# Patient Record
Sex: FEMALE | Race: WHITE | NOT HISPANIC OR LATINO | Employment: FULL TIME | URBAN - METROPOLITAN AREA
[De-identification: names, ages, dates, MRNs, and addresses within clinical notes are randomized per-mention and may not be internally consistent; named-entity substitution may affect disease eponyms.]

---

## 2018-03-09 ENCOUNTER — OFFICE VISIT CONVERTED (OUTPATIENT)
Dept: ORTHOPEDIC SURGERY | Facility: CLINIC | Age: 58
End: 2018-03-09
Attending: ORTHOPAEDIC SURGERY

## 2018-03-09 ENCOUNTER — CONVERSION ENCOUNTER (OUTPATIENT)
Dept: ORTHOPEDIC SURGERY | Facility: CLINIC | Age: 58
End: 2018-03-09

## 2018-08-27 ENCOUNTER — OFFICE VISIT CONVERTED (OUTPATIENT)
Dept: ORTHOPEDIC SURGERY | Facility: CLINIC | Age: 58
End: 2018-08-27
Attending: ORTHOPAEDIC SURGERY

## 2018-09-05 ENCOUNTER — CONVERSION ENCOUNTER (OUTPATIENT)
Dept: GASTROENTEROLOGY | Facility: CLINIC | Age: 58
End: 2018-09-05

## 2018-09-05 ENCOUNTER — OFFICE VISIT CONVERTED (OUTPATIENT)
Dept: GASTROENTEROLOGY | Facility: CLINIC | Age: 58
End: 2018-09-05
Attending: INTERNAL MEDICINE

## 2018-10-22 ENCOUNTER — CONVERSION ENCOUNTER (OUTPATIENT)
Dept: GASTROENTEROLOGY | Facility: CLINIC | Age: 58
End: 2018-10-22

## 2018-10-22 ENCOUNTER — OFFICE VISIT CONVERTED (OUTPATIENT)
Dept: GASTROENTEROLOGY | Facility: CLINIC | Age: 58
End: 2018-10-22
Attending: NURSE PRACTITIONER

## 2018-11-26 ENCOUNTER — OFFICE VISIT CONVERTED (OUTPATIENT)
Dept: GASTROENTEROLOGY | Facility: CLINIC | Age: 58
End: 2018-11-26
Attending: NURSE PRACTITIONER

## 2019-01-22 ENCOUNTER — OFFICE VISIT CONVERTED (OUTPATIENT)
Dept: PULMONOLOGY | Facility: CLINIC | Age: 59
End: 2019-01-22
Attending: INTERNAL MEDICINE

## 2019-01-25 ENCOUNTER — HOSPITAL ENCOUNTER (OUTPATIENT)
Dept: CARDIOLOGY | Facility: HOSPITAL | Age: 59
Discharge: HOME | End: 2019-01-25
Attending: INTERNAL MEDICINE

## 2019-02-28 ENCOUNTER — OFFICE VISIT CONVERTED (OUTPATIENT)
Dept: INTERNAL MEDICINE | Facility: CLINIC | Age: 59
End: 2019-02-28
Attending: INTERNAL MEDICINE

## 2019-03-13 ENCOUNTER — OFFICE VISIT CONVERTED (OUTPATIENT)
Dept: INTERNAL MEDICINE | Facility: CLINIC | Age: 59
End: 2019-03-13
Attending: INTERNAL MEDICINE

## 2019-03-27 ENCOUNTER — OFFICE VISIT CONVERTED (OUTPATIENT)
Dept: INTERNAL MEDICINE | Facility: CLINIC | Age: 59
End: 2019-03-27
Attending: INTERNAL MEDICINE

## 2019-04-15 ENCOUNTER — OFFICE VISIT CONVERTED (OUTPATIENT)
Dept: ORTHOPEDIC SURGERY | Facility: CLINIC | Age: 59
End: 2019-04-15
Attending: ORTHOPAEDIC SURGERY

## 2019-04-15 ENCOUNTER — CONVERSION ENCOUNTER (OUTPATIENT)
Dept: ORTHOPEDIC SURGERY | Facility: CLINIC | Age: 59
End: 2019-04-15

## 2019-05-10 ENCOUNTER — OFFICE VISIT CONVERTED (OUTPATIENT)
Dept: PULMONOLOGY | Facility: CLINIC | Age: 59
End: 2019-05-10
Attending: INTERNAL MEDICINE

## 2019-05-10 ENCOUNTER — HOSPITAL ENCOUNTER (OUTPATIENT)
Dept: GENERAL RADIOLOGY | Facility: HOSPITAL | Age: 59
Discharge: HOME | End: 2019-05-10
Attending: INTERNAL MEDICINE

## 2019-05-15 ENCOUNTER — HOSPITAL ENCOUNTER (OUTPATIENT)
Dept: MRI IMAGING | Facility: HOSPITAL | Age: 59
Discharge: HOME | End: 2019-05-15
Attending: ORTHOPAEDIC SURGERY

## 2019-05-20 ENCOUNTER — OFFICE VISIT CONVERTED (OUTPATIENT)
Dept: ORTHOPEDIC SURGERY | Facility: CLINIC | Age: 59
End: 2019-05-20
Attending: ORTHOPAEDIC SURGERY

## 2019-07-19 ENCOUNTER — OFFICE VISIT CONVERTED (OUTPATIENT)
Dept: INTERNAL MEDICINE | Facility: CLINIC | Age: 59
End: 2019-07-19
Attending: NURSE PRACTITIONER

## 2019-08-29 ENCOUNTER — CONVERSION ENCOUNTER (OUTPATIENT)
Dept: INTERNAL MEDICINE | Facility: CLINIC | Age: 59
End: 2019-08-29

## 2019-08-29 ENCOUNTER — OFFICE VISIT CONVERTED (OUTPATIENT)
Dept: INTERNAL MEDICINE | Facility: CLINIC | Age: 59
End: 2019-08-29
Attending: INTERNAL MEDICINE

## 2019-10-28 ENCOUNTER — OFFICE VISIT CONVERTED (OUTPATIENT)
Dept: INTERNAL MEDICINE | Facility: CLINIC | Age: 59
End: 2019-10-28
Attending: INTERNAL MEDICINE

## 2020-02-24 ENCOUNTER — HOSPITAL ENCOUNTER (OUTPATIENT)
Dept: LAB | Facility: HOSPITAL | Age: 60
Discharge: HOME | End: 2020-02-24
Attending: INTERNAL MEDICINE

## 2020-02-24 LAB
25(OH)D3 SERPL-MCNC: 21.1 NG/ML (ref 30–100)
ALBUMIN SERPL-MCNC: 4.3 G/DL (ref 3.5–5)
ALBUMIN/GLOB SERPL: 1.4 {RATIO} (ref 1.4–2.6)
ALP SERPL-CCNC: 61 U/L (ref 53–141)
ALT SERPL-CCNC: 8 U/L (ref 10–40)
ANION GAP SERPL CALC-SCNC: 19 MMOL/L (ref 8–19)
AST SERPL-CCNC: 13 U/L (ref 15–50)
BASOPHILS # BLD AUTO: 0.06 10*3/UL (ref 0–0.2)
BASOPHILS NFR BLD AUTO: 1 % (ref 0–3)
BILIRUB SERPL-MCNC: 0.42 MG/DL (ref 0.2–1.3)
BUN SERPL-MCNC: 15 MG/DL (ref 5–25)
BUN/CREAT SERPL: 16 {RATIO} (ref 6–20)
CALCIUM SERPL-MCNC: 9.6 MG/DL (ref 8.7–10.4)
CHLORIDE SERPL-SCNC: 102 MMOL/L (ref 99–111)
CHOLEST SERPL-MCNC: 220 MG/DL (ref 107–200)
CHOLEST/HDLC SERPL: 4.2 {RATIO} (ref 3–6)
CONV ABS IMM GRAN: 0.03 10*3/UL (ref 0–0.2)
CONV CO2: 28 MMOL/L (ref 22–32)
CONV IMMATURE GRAN: 0.5 % (ref 0–1.8)
CONV TOTAL PROTEIN: 7.4 G/DL (ref 6.3–8.2)
CREAT UR-MCNC: 0.91 MG/DL (ref 0.5–0.9)
DEPRECATED RDW RBC AUTO: 43.5 FL (ref 36.4–46.3)
EOSINOPHIL # BLD AUTO: 0.32 10*3/UL (ref 0–0.7)
EOSINOPHIL # BLD AUTO: 5.1 % (ref 0–7)
ERYTHROCYTE [DISTWIDTH] IN BLOOD BY AUTOMATED COUNT: 12.8 % (ref 11.7–14.4)
GFR SERPLBLD BASED ON 1.73 SQ M-ARVRAT: >60 ML/MIN/{1.73_M2}
GLOBULIN UR ELPH-MCNC: 3.1 G/DL (ref 2–3.5)
GLUCOSE SERPL-MCNC: 93 MG/DL (ref 65–99)
HCT VFR BLD AUTO: 48.6 % (ref 37–47)
HDLC SERPL-MCNC: 53 MG/DL (ref 40–60)
HGB BLD-MCNC: 15.1 G/DL (ref 12–16)
LDLC SERPL CALC-MCNC: 137 MG/DL (ref 70–100)
LYMPHOCYTES # BLD AUTO: 1.79 10*3/UL (ref 1–5)
LYMPHOCYTES NFR BLD AUTO: 28.5 % (ref 20–45)
MCH RBC QN AUTO: 28.7 PG (ref 27–31)
MCHC RBC AUTO-ENTMCNC: 31.1 G/DL (ref 33–37)
MCV RBC AUTO: 92.4 FL (ref 81–99)
MONOCYTES # BLD AUTO: 0.45 10*3/UL (ref 0.2–1.2)
MONOCYTES NFR BLD AUTO: 7.2 % (ref 3–10)
NEUTROPHILS # BLD AUTO: 3.62 10*3/UL (ref 2–8)
NEUTROPHILS NFR BLD AUTO: 57.7 % (ref 30–85)
NRBC CBCN: 0 % (ref 0–0.7)
OSMOLALITY SERPL CALC.SUM OF ELEC: 301 MOSM/KG (ref 273–304)
PLATELET # BLD AUTO: 372 10*3/UL (ref 130–400)
PMV BLD AUTO: 9.8 FL (ref 9.4–12.3)
POTASSIUM SERPL-SCNC: 3.9 MMOL/L (ref 3.5–5.3)
RBC # BLD AUTO: 5.26 10*6/UL (ref 4.2–5.4)
SODIUM SERPL-SCNC: 145 MMOL/L (ref 135–147)
TRIGL SERPL-MCNC: 150 MG/DL (ref 40–150)
TSH SERPL-ACNC: 1.56 M[IU]/L (ref 0.27–4.2)
VLDLC SERPL-MCNC: 30 MG/DL (ref 5–37)
WBC # BLD AUTO: 6.27 10*3/UL (ref 4.8–10.8)

## 2020-03-04 ENCOUNTER — OFFICE VISIT CONVERTED (OUTPATIENT)
Dept: INTERNAL MEDICINE | Facility: CLINIC | Age: 60
End: 2020-03-04
Attending: INTERNAL MEDICINE

## 2020-03-04 ENCOUNTER — CONVERSION ENCOUNTER (OUTPATIENT)
Dept: INTERNAL MEDICINE | Facility: CLINIC | Age: 60
End: 2020-03-04

## 2020-09-04 ENCOUNTER — HOSPITAL ENCOUNTER (OUTPATIENT)
Dept: OTHER | Facility: HOSPITAL | Age: 60
Discharge: HOME | End: 2020-09-04
Attending: INTERNAL MEDICINE

## 2020-09-04 ENCOUNTER — OFFICE VISIT CONVERTED (OUTPATIENT)
Dept: INTERNAL MEDICINE | Facility: CLINIC | Age: 60
End: 2020-09-04
Attending: INTERNAL MEDICINE

## 2020-09-04 LAB
ALBUMIN SERPL-MCNC: 4.2 G/DL (ref 3.5–5)
ALBUMIN/GLOB SERPL: 1.4 {RATIO} (ref 1.4–2.6)
ALP SERPL-CCNC: 61 U/L (ref 53–141)
ALT SERPL-CCNC: 9 U/L (ref 10–40)
ANION GAP SERPL CALC-SCNC: 16 MMOL/L (ref 8–19)
AST SERPL-CCNC: 15 U/L (ref 15–50)
BASOPHILS # BLD AUTO: 0.09 10*3/UL (ref 0–0.2)
BASOPHILS NFR BLD AUTO: 1 % (ref 0–3)
BILIRUB SERPL-MCNC: 0.37 MG/DL (ref 0.2–1.3)
BUN SERPL-MCNC: 15 MG/DL (ref 5–25)
BUN/CREAT SERPL: 14 {RATIO} (ref 6–20)
CALCIUM SERPL-MCNC: 9.4 MG/DL (ref 8.7–10.4)
CHLORIDE SERPL-SCNC: 98 MMOL/L (ref 99–111)
CHOLEST SERPL-MCNC: 238 MG/DL (ref 107–200)
CHOLEST/HDLC SERPL: 5.8 {RATIO} (ref 3–6)
CONV ABS IMM GRAN: 0.04 10*3/UL (ref 0–0.2)
CONV CO2: 27 MMOL/L (ref 22–32)
CONV IMMATURE GRAN: 0.4 % (ref 0–1.8)
CONV TOTAL PROTEIN: 7.3 G/DL (ref 6.3–8.2)
CREAT UR-MCNC: 1.07 MG/DL (ref 0.5–0.9)
DEPRECATED RDW RBC AUTO: 42.7 FL (ref 36.4–46.3)
EOSINOPHIL # BLD AUTO: 0.24 10*3/UL (ref 0–0.7)
EOSINOPHIL # BLD AUTO: 2.7 % (ref 0–7)
ERYTHROCYTE [DISTWIDTH] IN BLOOD BY AUTOMATED COUNT: 12.4 % (ref 11.7–14.4)
GFR SERPLBLD BASED ON 1.73 SQ M-ARVRAT: 57 ML/MIN/{1.73_M2}
GLOBULIN UR ELPH-MCNC: 3.1 G/DL (ref 2–3.5)
GLUCOSE SERPL-MCNC: 87 MG/DL (ref 65–99)
HCT VFR BLD AUTO: 47.3 % (ref 37–47)
HDLC SERPL-MCNC: 41 MG/DL (ref 40–60)
HGB BLD-MCNC: 14.8 G/DL (ref 12–16)
LDLC SERPL CALC-MCNC: 149 MG/DL (ref 70–100)
LYMPHOCYTES # BLD AUTO: 2.21 10*3/UL (ref 1–5)
LYMPHOCYTES NFR BLD AUTO: 24.5 % (ref 20–45)
MCH RBC QN AUTO: 29 PG (ref 27–31)
MCHC RBC AUTO-ENTMCNC: 31.3 G/DL (ref 33–37)
MCV RBC AUTO: 92.6 FL (ref 81–99)
MONOCYTES # BLD AUTO: 0.48 10*3/UL (ref 0.2–1.2)
MONOCYTES NFR BLD AUTO: 5.3 % (ref 3–10)
NEUTROPHILS # BLD AUTO: 5.95 10*3/UL (ref 2–8)
NEUTROPHILS NFR BLD AUTO: 66.1 % (ref 30–85)
NRBC CBCN: 0 % (ref 0–0.7)
OSMOLALITY SERPL CALC.SUM OF ELEC: 284 MOSM/KG (ref 273–304)
PLATELET # BLD AUTO: 406 10*3/UL (ref 130–400)
PMV BLD AUTO: 10.8 FL (ref 9.4–12.3)
POTASSIUM SERPL-SCNC: 3.6 MMOL/L (ref 3.5–5.3)
RBC # BLD AUTO: 5.11 10*6/UL (ref 4.2–5.4)
SODIUM SERPL-SCNC: 137 MMOL/L (ref 135–147)
TRIGL SERPL-MCNC: 241 MG/DL (ref 40–150)
TSH SERPL-ACNC: 1.47 M[IU]/L (ref 0.27–4.2)
VIT B12 SERPL-MCNC: 567 PG/ML (ref 211–911)
VLDLC SERPL-MCNC: 48 MG/DL (ref 5–37)
WBC # BLD AUTO: 9.01 10*3/UL (ref 4.8–10.8)

## 2020-09-05 LAB — 25(OH)D3 SERPL-MCNC: 27.8 NG/ML (ref 30–100)

## 2021-03-05 ENCOUNTER — OFFICE VISIT CONVERTED (OUTPATIENT)
Dept: INTERNAL MEDICINE | Facility: CLINIC | Age: 61
End: 2021-03-05
Attending: STUDENT IN AN ORGANIZED HEALTH CARE EDUCATION/TRAINING PROGRAM

## 2021-03-05 ENCOUNTER — HOSPITAL ENCOUNTER (OUTPATIENT)
Dept: OTHER | Facility: HOSPITAL | Age: 61
Discharge: HOME | End: 2021-03-05
Attending: STUDENT IN AN ORGANIZED HEALTH CARE EDUCATION/TRAINING PROGRAM

## 2021-03-05 LAB
25(OH)D3 SERPL-MCNC: 21.4 NG/ML (ref 30–100)
ALBUMIN SERPL-MCNC: 4.1 G/DL (ref 3.5–5)
ALBUMIN/GLOB SERPL: 1.6 {RATIO} (ref 1.4–2.6)
ALP SERPL-CCNC: 54 U/L (ref 53–141)
ALT SERPL-CCNC: 8 U/L (ref 10–40)
ANION GAP SERPL CALC-SCNC: 18 MMOL/L (ref 8–19)
AST SERPL-CCNC: 13 U/L (ref 15–50)
BASOPHILS # BLD AUTO: 0.07 10*3/UL (ref 0–0.2)
BASOPHILS NFR BLD AUTO: 1.2 % (ref 0–3)
BILIRUB SERPL-MCNC: 0.52 MG/DL (ref 0.2–1.3)
BUN SERPL-MCNC: 12 MG/DL (ref 5–25)
BUN/CREAT SERPL: 13 {RATIO} (ref 6–20)
CALCIUM SERPL-MCNC: 9.5 MG/DL (ref 8.7–10.4)
CHLORIDE SERPL-SCNC: 103 MMOL/L (ref 99–111)
CHOLEST SERPL-MCNC: 220 MG/DL (ref 107–200)
CHOLEST/HDLC SERPL: 4.7 {RATIO} (ref 3–6)
CONV ABS IMM GRAN: 0.02 10*3/UL (ref 0–0.2)
CONV CO2: 27 MMOL/L (ref 22–32)
CONV IMMATURE GRAN: 0.3 % (ref 0–1.8)
CONV TOTAL PROTEIN: 6.7 G/DL (ref 6.3–8.2)
CREAT UR-MCNC: 0.9 MG/DL (ref 0.5–0.9)
DEPRECATED RDW RBC AUTO: 41.6 FL (ref 36.4–46.3)
EOSINOPHIL # BLD AUTO: 0.22 10*3/UL (ref 0–0.7)
EOSINOPHIL # BLD AUTO: 3.7 % (ref 0–7)
ERYTHROCYTE [DISTWIDTH] IN BLOOD BY AUTOMATED COUNT: 12.9 % (ref 11.7–14.4)
GFR SERPLBLD BASED ON 1.73 SQ M-ARVRAT: >60 ML/MIN/{1.73_M2}
GLOBULIN UR ELPH-MCNC: 2.6 G/DL (ref 2–3.5)
GLUCOSE SERPL-MCNC: 91 MG/DL (ref 65–99)
HCT VFR BLD AUTO: 43.7 % (ref 37–47)
HDLC SERPL-MCNC: 47 MG/DL (ref 40–60)
HGB BLD-MCNC: 14.3 G/DL (ref 12–16)
LDLC SERPL CALC-MCNC: 140 MG/DL (ref 70–100)
LYMPHOCYTES # BLD AUTO: 1.85 10*3/UL (ref 1–5)
LYMPHOCYTES NFR BLD AUTO: 31.2 % (ref 20–45)
MCH RBC QN AUTO: 28.8 PG (ref 27–31)
MCHC RBC AUTO-ENTMCNC: 32.7 G/DL (ref 33–37)
MCV RBC AUTO: 88.1 FL (ref 81–99)
MONOCYTES # BLD AUTO: 0.39 10*3/UL (ref 0.2–1.2)
MONOCYTES NFR BLD AUTO: 6.6 % (ref 3–10)
NEUTROPHILS # BLD AUTO: 3.38 10*3/UL (ref 2–8)
NEUTROPHILS NFR BLD AUTO: 57 % (ref 30–85)
NRBC CBCN: 0 % (ref 0–0.7)
OSMOLALITY SERPL CALC.SUM OF ELEC: 297 MOSM/KG (ref 273–304)
PLATELET # BLD AUTO: 356 10*3/UL (ref 130–400)
PMV BLD AUTO: 9.9 FL (ref 9.4–12.3)
POTASSIUM SERPL-SCNC: 3.5 MMOL/L (ref 3.5–5.3)
RBC # BLD AUTO: 4.96 10*6/UL (ref 4.2–5.4)
SODIUM SERPL-SCNC: 144 MMOL/L (ref 135–147)
T4 FREE SERPL-MCNC: 1.4 NG/DL (ref 0.9–1.8)
TRIGL SERPL-MCNC: 165 MG/DL (ref 40–150)
TSH SERPL-ACNC: 1.18 M[IU]/L (ref 0.27–4.2)
VLDLC SERPL-MCNC: 33 MG/DL (ref 5–37)
WBC # BLD AUTO: 5.93 10*3/UL (ref 4.8–10.8)

## 2021-05-13 NOTE — PROGRESS NOTES
"   Progress Note      Patient Name: Oksana Smith   Patient ID: [id number]   Sex: Female   YOB: 1960    Primary Care Provider: Portia Serna MD   Referring Provider: Omid Carlos MD    Visit Date: September 4, 2020    Provider: Portia Serna MD   Location: [facility]   Location Address: [address] [place] [address], [address]  [place], [place]  [id number]   Location Phone: [phone]          Chief Complaint  · 6 month f/u  · \"right ear hurting\"      History Of Present Illness  Oksana Smith is a 59 year old /White female who presents for evaluation and treatment of:      6 month f/u, needs labs today, no concerns with chronic conditions    \"right ear pain\" x 4 days, stuck a Q-tip in her ear today and it hurt so she knows its infected.   denies fever           Past Medical History  Disease Name Date Onset Notes   Atypical ductal hyperplasia of right breast --  --    Breast lump --  --    Cancer --  --    Chronic cough --  --    Constipation --  --    Early satiety --  --    Esophageal dysphagia --  --    Fatigue --  --    Hemidiaphragm paralysis --  --    HTN (hypertension) --  --    Hypothyroidism --  --    Lateral epicondylitis, left 03/14/2018 --    Rectal Bleeding --  --    Right knee pain, unspecified chronicity 08/30/2018 --    Right knee PCL tear 08/30/2018 --    Shortness of breath --  --    Thymoma, malignant --  --    Thyroid disorder --  --    Vitamin B12 Deficiency --  --    Vitamin D Deficiency --  --          Past Surgical History  Procedure Name Date Notes   Colonoscopy --  Dr. Roque   EGD 9.11.18 Dr. Roque   Hysterectomy 1994 --    Lumpectomy, right breast --  --    Tonsilectomy 1967 --    Tonsillectomy --  --          Medication List  Name Date Started Instructions   ammonium lactate 12 % topical lotion 02/28/2019 apply to affected area by topical route 2 times a day as needed   clotrimazole-betamethasone 1-0.05 % topical lotion 09/04/2020 apply to the " affected area(s) by topical route 2 times per day in the morning and evening   cyanocobalamin (vitamin B-12) 1,000 mcg oral capsule 2020 take 1 capsule by oral route daily for 90 days   ergocalciferol (vitamin D2) 1,250 mcg (50,000 unit) oral capsule 2020 TAKE 1 CAPSULE BY MOUTH 1 TIME WEEKLY   nystatin 100,000 unit/gram topical powder 2020 apply to the affected area(s) by topical route 3 times per day   Procare TENS-EMS miscellaneous combo pack 2020 use as directed   Synthroid 50 mcg oral tablet 2020 take 1 tablet (50 mcg) by oral route once daily for 90 days   triamterene-hydrochlorothiazid 37.5-25 mg oral tablet 2020 take 0.5 tablet by oral route daily as needed for 90 days   vitamin B complex oral tablet  take 1 tablet by oral route daily         Allergy List  Allergen Name Date Reaction Notes   clindamycin HCl --  --  --    erythromycin --  --  --          Family Medical History  Disease Name Relative/Age Notes   Breast Neoplasm, Malignant Mother/   --    Heart Disease Father/  Mother/   Mother; Father  Mother   Cancer, Unspecified Mother/   Mother   Diabetes, unspecified type Sister/   Sister   Family history of certain chronic disabling diseases; arthritis Father/  Mother/   Mother; Father   No family history of colorectal cancer  --          Reproductive History  Menstrual   Pregnancy Summary   Total Pregnancies: 3 Full Term: 3 Premature: 0   Ab Induced: 0 Ab Spontaneous: 0 Ectopics: 0   Multiples: 0 Livin         Social History  Finding Status Start/Stop Quantity Notes   Alcohol Use Never --/-- --  does not drink   lives with spouse --  --/-- --  --    . --  --/-- --  --    Recreational Drug Use Never --/-- --  no   Tobacco Never --/-- --  never smoker   Working --  --/-- --  --          Immunizations  NameDate Admin Mfg Trade Name Lot Number Route Inj VIS Given VIS Publication   [id number] [employer] Fluarix, quadrivalent, preservative free [id number] NE  "02/28/2019 08/07/2015   Comments:          Review of Systems  · Constitutional  o Denies  o : fever, fatigue, weight loss, weight gain  · HENT  o Admits  o : ear pain  · Cardiovascular  o Denies  o : lower extremity edema, claudication, chest pressure, palpitations  · Respiratory  o Denies  o : shortness of breath, wheezing, cough, hemoptysis, dyspnea on exertion  · Gastrointestinal  o Denies  o : nausea, vomiting, diarrhea, constipation, abdominal pain      Vitals  Date Time BP Position Site L\R Cuff Size HR RR TEMP (F) WT  HT  BMI kg/m2 BSA m2 O2 Sat HC       10/28/2019 02:51 /76 Sitting    83 - R  99.6 179lbs 2oz 5'  5\" 29.81 1.93 96 %    03/04/2020 04:10 /66 Sitting    82 - R  98.2 180lbs 2oz 5'  5\" 29.97 1.94 98 %    09/04/2020 03:23 /80 Sitting    96 - R  98.7 171lbs 0oz 5'  5\" 28.46 1.89 97 %          Physical Examination  · Constitutional  o Appearance  o : no acute distress, well-nourished  · Head and Face  o Head  o :   § Inspection  § : atraumatic, normocephalic  · Eyes  o Eyes  o : extraocular movements intact, no scleral icterus, no conjunctival injection  · Ears, Nose, Mouth and Throat  o Ears  o :   § External Ears  § : normal  § Otoscopic Examination  § : tympanic membrane appearance within normal limits bilaterally, right ear canal erythematous with small amount of yellow drainage  o Nose  o :   § Intranasal Exam  § : nares patent  o Oral Cavity  o :   § Oral Mucosa  § : moist mucous membranes  · Respiratory  o Respiratory Effort  o : breathing comfortably, symmetric chest rise  o Auscultation of Lungs  o : clear to asculatation bilaterally, no wheezes, rales, or rhonchii  · Cardiovascular  o Heart  o :   § Auscultation of Heart  § : regular rate and rhythm, no murmurs, rubs, or gallops  o Peripheral Vascular System  o :   § Extremities  § : no edema  · Neurologic  o Mental Status Examination  o :   § Orientation  § : grossly oriented to person, place and time  o Gait and " Station  o :   § Gait Screening  § : normal gait  · Psychiatric  o General  o : normal mood and affect          Assessment  · HTN (hypertension)     401.9/I10  well controlled today  · Vitamin D Deficiency     268.9/E55.9  · Hypothyroidism     244.9/E03.9  labs previously ordered, will draw today in clinic  · Vitamin B12 Deficiency     281.1/D51.9  · Otitis externa of right ear     380.10/H60.91  will treat with Ciprodex drops    Problems Reconciled  Plan  · Orders  o ACO-39: Current medications updated and reviewed () - - 09/04/2020  · Medications  o Ciprodex 0.3-0.1 % otic (ear) drops,suspension   SIG: instill 4 drops into right ear by otic route 2 times per day for 7 days   DISP: (1) 7.5 ml drop btl with 0 refills  Prescribed on 09/04/2020     o Medications have been Reconciled  o Transition of Care or Provider Policy  · Instructions  o Take all medications as prescribed/directed.  o Patient was educated/instructed on their diagnosis, treatment and medications prior to discharge from the clinic today.  o Call the office with any concerns or questions.  · Disposition  o Follow up in 6 months  o Labs drawn in clinic  o Prescriptions sent electronically            Electronically Signed by: Portia Serna MD -Author on September 4, 2020 04:01:23 PM

## 2021-05-14 VITALS
SYSTOLIC BLOOD PRESSURE: 124 MMHG | HEIGHT: 65 IN | WEIGHT: 171 LBS | OXYGEN SATURATION: 97 % | HEART RATE: 96 BPM | TEMPERATURE: 98.7 F | BODY MASS INDEX: 28.49 KG/M2 | DIASTOLIC BLOOD PRESSURE: 80 MMHG

## 2021-05-14 VITALS
BODY MASS INDEX: 27.7 KG/M2 | TEMPERATURE: 98 F | HEIGHT: 65 IN | WEIGHT: 166.25 LBS | HEART RATE: 80 BPM | DIASTOLIC BLOOD PRESSURE: 70 MMHG | OXYGEN SATURATION: 96 % | SYSTOLIC BLOOD PRESSURE: 124 MMHG

## 2021-05-14 NOTE — PROGRESS NOTES
Progress Note      Patient Name: Oksana Smith   Patient ID: [id number]   Sex: Female   YOB: 1960    Primary Care Provider: Portia Serna MD   Referring Provider: Omid Carlos MD    Visit Date: March 5, 2021    Provider: Shama Villafuerte MD   Location: [facility]   Location Address: [address] [place] [address], [address]  [place], [place]  [id number]   Location Phone: [phone]          Chief Complaint  · 6 month follow-up      History Of Present Illness  Oksana Smith is a 60 year old /White female who presents for evaluation and treatment of:      She would like referral to thoracic surgeon- Jose Arias, hx of Thymoma in 2018.  Cancer was caught early, and did not require chemotherapy or radiation.      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 She gets tired very easily, winded easily because of this. Bending is very difficulty, winded when she stands upright. Would like walker with seat.She would like referral to Dr. Shazia Friedman ([place]) at [facility].   MRI breast and  Mammogram scheduled for Wednesday 3/10 - needs referral for [facility] for MRI and to [facility] for her mammogram. MRI is for known hx  of ADH (atypical ductal hyperplasia) of right breast.     She is also in need of medication refill.   She would  also like to know about vitamin C  IV therapy for cancer prophylaxis and for energy boosting.     She has not left her house since March 6th of 2020 due to Covid pandemic, due to her increased risk with her lung issues as described above. She's had the Pfizer vaccine and is looking forward to surprising her daughter in [place] this weekend. She is expecting her first grandchild.       Past Medical History  Disease Name Date Onset Notes   Atypical ductal hyperplasia of right breast --  --    Breast lump --  --    Cancer --  --    Chronic cough --  --    Constipation  --  --    Early satiety --  --    Esophageal dysphagia --  --    Fatigue --  --    Hemidiaphragm paralysis --  --    HTN (hypertension) --  --    Hypothyroidism --  --    Lateral epicondylitis, left 03/14/2018 --    Rectal Bleeding --  --    Right knee pain, unspecified chronicity 08/30/2018 --    Right knee PCL tear 08/30/2018 --    Shortness of breath --  --    Thymoma, malignant --  --    Thyroid disorder --  --    Vitamin B12 Deficiency --  --    Vitamin D Deficiency --  --          Past Surgical History  Procedure Name Date Notes   Colonoscopy --  Dr. Roque   EGD 9.11.18 Dr. Roque   Hysterectomy 1994 --    Lumpectomy, right breast --  --    Tonsilectomy 1967 --    Tonsillectomy --  --          Medication List  Name Date Started Instructions   ammonium lactate 12 % topical lotion 02/28/2019 apply to affected area by topical route 2 times a day as needed   Ciprodex 0.3-0.1 % otic (ear) drops,suspension 09/04/2020 instill 4 drops into right ear by otic route 2 times per day for 7 days   clotrimazole-betamethasone 1-0.05 % topical cream 10/01/2020 apply to the affected and surrounding areas of skin by topical route 2 times per day in the morning and evening for 2 weeks   cyanocobalamin (vitamin B-12) 1,000 mcg oral capsule 09/04/2020 take 1 capsule by oral route daily for 90 days   ergocalciferol (vitamin D2) 1,250 mcg (50,000 unit) oral capsule 09/04/2020 TAKE 1 CAPSULE BY MOUTH 1 TIME WEEKLY   nystatin 100,000 unit/gram topical powder 09/04/2020 apply to the affected area(s) by topical route 3 times per day   Procare TENS-EMS miscellaneous combo pack 03/04/2020 use as directed   Synthroid 50 mcg oral tablet 09/04/2020 take 1 tablet (50 mcg) by oral route once daily for 90 days   triamterene-hydrochlorothiazid 37.5-25 mg oral tablet 09/04/2020 take 0.5 tablet by oral route daily as needed for 90 days   vitamin B complex oral tablet  take 1 tablet by oral route daily   VITAMIN D2 50,000IU (ERGO) CAP RX 12/23/2020  "TAKE 1 CAPSULE BY MOUTH 1 TIME WEEKLY         Allergy List  Allergen Name Date Reaction Notes   clindamycin HCl --  --  --    erythromycin --  --  --        Allergies Reconciled  Family Medical History  Disease Name Relative/Age Notes   Breast Neoplasm, Malignant Mother/   --    Heart Disease Father/  Mother/   Mother; Father  Mother   Cancer, Unspecified Mother/   Mother   Diabetes, unspecified type Sister/   Sister   Family history of certain chronic disabling diseases; arthritis Father/  Mother/   Mother; Father   No family history of colorectal cancer  --          Reproductive History  Menstrual   Pregnancy Summary   Total Pregnancies: 3 Full Term: 3 Premature: 0   Ab Induced: 0 Ab Spontaneous: 0 Ectopics: 0   Multiples: 0 Livin         Social History  Finding Status Start/Stop Quantity Notes   Alcohol Use Never --/-- --  does not drink   lives with spouse --  --/-- --  --    . --  --/-- --  --    Recreational Drug Use Never --/-- --  no   Tobacco Never --/-- --  never smoker   Working --  --/-- --  --          Immunizations  NameDate Admin Mfg Trade Name Lot Number Route Inj VIS Given VIS Publication   [id number]/16/2020 [facility] Fluzone Quadrivalent [id number] IM LD 2020    Comments: Patient tolerated well left office in stable condition. CH RN         Review of Systems  · Constitutional  o Admits  o : weight loss  o Denies  o : fever, fatigue, weight gain  · Cardiovascular  o Denies  o : lower extremity edema, claudication, chest pressure, palpitations  · Respiratory  o * See HPI  · Gastrointestinal  o Denies  o : nausea, vomiting, diarrhea, constipation, abdominal pain  · Psychiatric  o Denies  o : anxiety, depression      Vitals  Date Time BP Position Site L\R Cuff Size HR RR TEMP (F) WT  HT  BMI kg/m2 BSA m2 O2 Sat FR L/min FiO2 HC       2020 04:10 /66 Sitting    82 - R  98.2 180lbs 2oz 5'  5\" 29.97 1.94 98 %      2020 03:23 /80 Sitting    96 - R  98.7 171lbs 0oz 5'  5\" " "28.46 1.89 97 %      03/05/2021 09:27 /70 Sitting    80 - R  98 166lbs 4oz 5'  5\" 27.67 1.86 96 %  21%          Physical Examination  · Constitutional  o Appearance  o : no acute distress, well-nourished  · Head and Face  o Head  o :   § Inspection  § : atraumatic, normocephalic  · Ears, Nose, Mouth and Throat  o Ears  o :   § External Ears  § : normal  o Nose  o :   § Intranasal Exam  § : nares patent  o Oral Cavity  o :   § Oral Mucosa  § : moist mucous membranes  · Neck  o Thyroid  o : gland size normal, nontender, no nodules or masses present on palpation, symmetric  · Respiratory  o Respiratory Effort  o : breathing comfortably,  o Auscultation of Lungs  o : clear to asculatation bilaterally, no wheezes, rales, or rhonchi, breath sounds are slightly diminished over the left lung fields due to inability to take in deep breaths on that side.   · Cardiovascular  o Heart  o :   § Auscultation of Heart  § : regular rate and rhythm, no murmurs, rubs, or gallops  o Peripheral Vascular System  o :   § Extremities  § : no edema  · Skin and Subcutaneous Tissue  o General Inspection  o : no lesions present, no areas of discoloration, skin turgor normal  · Neurologic  o Mental Status Examination  o :   § Orientation  § : grossly oriented to person, place and time  o Cranial Nerves  o : crainial nerves 2-12 grossly intact  o Gait and Station  o :   § Gait Screening  § : normal gait          Assessment  · Screening for depression     V79.0/Z13.89  Negative screen with PhQ9 score of 4, + symptoms are related to her decreased energy and activity level related to the paralyzed left diaphragm.   · Visit for screening mammogram     V76.12/Z12.31  · Hypothyroidism     244.9/E03.9  Chronic and stable on Synthroid. in need of labs. Refilled meds.  · Vitamin D deficiency     268.9/E55.9  Chronic. In need of labs for continued monitoring.   · HTN (hypertension)     401.9/I10  Chronic and stable on current regimen.   · Vitamin " B12 Deficiency     281.1/D51.9  Chronic, receiving B12 injections and in need of B12 labs.   · Atypical ductal hyperplasia of right breast     610.8/N62  Chronic and presumed stable. In need of repeat imaging.   · History of Thymoma, malignant     164.0/C37  Hx of thymoma s/p resection in 2018. Did not require chemotherapy or radiation. Follows with Dr. Swenson (thoracic surgery), and Dr. Friedman (Oncology) in [place]. In need of new referrals for her insurance to be able to continue her care with her specialist. New referrals placed today.   · Breathing difficulty     786.09/R06.89  Chronic and stable secondary to left diaphragm paralysis. However limiting her ADL, desires walker with seat to help keep her active and decrease how fast she tires out with activities. DME for walker with seat provided for [facility].  · Diaphragmatic paralysis     519.4/J98.6  Chronic and stable. Occurred secondary to nerve injury during resection of her thymoma.   · COVID-19 vaccine series completed     V87.49/Z92.29    Problems Reconciled  Plan  · Orders  o ACO-18: Negative screen for clinical depression using a standardized tool () - V79.0/Z13.89 - 03/05/2021  o CBC with Auto Diff [facility] ([id number]) - 401.9/I10, 281.1/D51.9, 244.9/E03.9 - 03/05/2021  o CMP [facility] ([id number]) - 401.9/I10, 281.1/D51.9, 244.9/E03.9 - 03/05/2021  o Lipid Panel [facility] ([id number]) - 401.9/I10, 244.9/E03.9, 268.9/E55.9 - 03/05/2021  o Thyroid Profile (59924, 32916, THYII) - 244.9/E03.9 - 03/05/2021  o Vitamin D (25-Hydroxy) Level (01498) - 268.9/E55.9 - 03/05/2021  o ACO-39: Current medications updated and reviewed (, 1159F) - 401.9/I10, 281.1/D51.9, 164.0/C37, 519.4/J98.6 - 03/05/2021  o ONCOLOGY / HEMATOLOGY CONSULTATION (HEMOC) - 164.0/C37 - 03/05/2021   Dr. Diana Friedman at [facility]  o Mammogram 2D diagnostic breast bilateral (w/US if needed) [facility]. (, 00031) - V76.12/Z12.31, 610.8/N62 - 03/05/2021   At [facility]   o MRI Breast  Bilateral WITH and WITHOUT Contrast [facility] ([id number]) - V76.12/Z12.31, 610.8/N62 - 03/05/2021   at [facility]  · Medications  o ammonium lactate 12 % topical lotion   SIG: apply to affected area by topical route 2 times a day as needed   DISP: (1) Package with 2 refills  Refilled on 03/05/2021     o clotrimazole-betamethasone 1-0.05 % topical cream   SIG: apply to the affected and surrounding areas of skin by topical route 2 times per day in the morning and evening for 2 weeks   DISP: (45) Gram with 0 refills  Refilled on 03/05/2021     o cyanocobalamin (vitamin B-12) 1,000 mcg oral capsule   SIG: take 1 capsule by oral route daily for 90 days   DISP: (90) Capsule with 1 refills  Refilled on 03/05/2021     o ergocalciferol (vitamin D2) 1,250 mcg (50,000 unit) oral capsule   SIG: TAKE 1 CAPSULE BY MOUTH 1 TIME WEEKLY   DISP: (13) Capsule with 0 refills  Refilled on 03/05/2021     o Synthroid 50 mcg oral tablet   SIG: take 1 tablet (50 mcg) by oral route once daily for 90 days   DISP: (90) Tablet with 1 refills  Refilled on 03/05/2021     o triamterene-hydrochlorothiazid 37.5-25 mg oral tablet   SIG: take 0.5 tablet by oral route daily as needed for 90 days   DISP: (90) Tablet with 1 refills  Refilled on 03/05/2021     o Medications have been Reconciled  o Transition of Care or Provider Policy  · Instructions  o Depression Screen completed and scanned into the EMR under the designated folder within the patient's documents.  o Today's PHQ-9 result is _4__  o Patient was educated/instructed on their diagnosis, treatment and medications prior to discharge from the clinic today.            Electronically Signed by: Shama Villafuerte MD -Author on March 5, 2021 03:52:03 PM

## 2021-05-15 VITALS
WEIGHT: 177.37 LBS | SYSTOLIC BLOOD PRESSURE: 110 MMHG | BODY MASS INDEX: 29.55 KG/M2 | OXYGEN SATURATION: 98 % | RESPIRATION RATE: 16 BRPM | HEART RATE: 88 BPM | DIASTOLIC BLOOD PRESSURE: 64 MMHG | HEIGHT: 65 IN | TEMPERATURE: 98.4 F

## 2021-05-15 VITALS
OXYGEN SATURATION: 96 % | WEIGHT: 179.12 LBS | DIASTOLIC BLOOD PRESSURE: 76 MMHG | HEIGHT: 65 IN | TEMPERATURE: 99.6 F | HEART RATE: 83 BPM | BODY MASS INDEX: 29.84 KG/M2 | SYSTOLIC BLOOD PRESSURE: 116 MMHG

## 2021-05-15 VITALS
DIASTOLIC BLOOD PRESSURE: 78 MMHG | WEIGHT: 167 LBS | BODY MASS INDEX: 27.82 KG/M2 | OXYGEN SATURATION: 100 % | HEIGHT: 65 IN | RESPIRATION RATE: 12 BRPM | TEMPERATURE: 98.2 F | HEART RATE: 76 BPM | SYSTOLIC BLOOD PRESSURE: 108 MMHG

## 2021-05-15 VITALS
HEART RATE: 82 BPM | DIASTOLIC BLOOD PRESSURE: 66 MMHG | TEMPERATURE: 98.2 F | BODY MASS INDEX: 30.01 KG/M2 | OXYGEN SATURATION: 98 % | WEIGHT: 180.12 LBS | HEIGHT: 65 IN | SYSTOLIC BLOOD PRESSURE: 110 MMHG

## 2021-05-15 VITALS — HEART RATE: 84 BPM | WEIGHT: 172 LBS | HEIGHT: 65 IN | BODY MASS INDEX: 28.66 KG/M2 | OXYGEN SATURATION: 98 %

## 2021-05-15 VITALS
HEART RATE: 89 BPM | DIASTOLIC BLOOD PRESSURE: 78 MMHG | TEMPERATURE: 97.4 F | OXYGEN SATURATION: 97 % | BODY MASS INDEX: 28.86 KG/M2 | WEIGHT: 173.19 LBS | SYSTOLIC BLOOD PRESSURE: 108 MMHG | HEIGHT: 65 IN

## 2021-05-15 VITALS — BODY MASS INDEX: 28.39 KG/M2 | WEIGHT: 170.37 LBS | HEIGHT: 65 IN | OXYGEN SATURATION: 98 % | HEART RATE: 84 BPM

## 2021-05-16 VITALS — OXYGEN SATURATION: 97 % | HEIGHT: 65 IN | HEART RATE: 89 BPM | WEIGHT: 188 LBS | BODY MASS INDEX: 31.32 KG/M2

## 2021-05-16 VITALS
WEIGHT: 174 LBS | TEMPERATURE: 99.2 F | BODY MASS INDEX: 28.99 KG/M2 | DIASTOLIC BLOOD PRESSURE: 88 MMHG | SYSTOLIC BLOOD PRESSURE: 123 MMHG | HEIGHT: 65 IN | HEART RATE: 77 BPM

## 2021-05-16 VITALS
BODY MASS INDEX: 27.66 KG/M2 | HEIGHT: 65 IN | HEART RATE: 76 BPM | DIASTOLIC BLOOD PRESSURE: 74 MMHG | SYSTOLIC BLOOD PRESSURE: 109 MMHG | WEIGHT: 166 LBS

## 2021-05-16 VITALS
OXYGEN SATURATION: 96 % | DIASTOLIC BLOOD PRESSURE: 66 MMHG | WEIGHT: 166 LBS | HEART RATE: 90 BPM | TEMPERATURE: 99.1 F | HEIGHT: 65 IN | SYSTOLIC BLOOD PRESSURE: 110 MMHG | BODY MASS INDEX: 27.66 KG/M2 | RESPIRATION RATE: 16 BRPM

## 2021-05-16 VITALS
HEIGHT: 65 IN | DIASTOLIC BLOOD PRESSURE: 73 MMHG | HEART RATE: 89 BPM | SYSTOLIC BLOOD PRESSURE: 128 MMHG | BODY MASS INDEX: 27.32 KG/M2 | WEIGHT: 164 LBS

## 2021-05-16 VITALS
TEMPERATURE: 97.8 F | BODY MASS INDEX: 27.99 KG/M2 | SYSTOLIC BLOOD PRESSURE: 110 MMHG | WEIGHT: 168 LBS | OXYGEN SATURATION: 100 % | HEIGHT: 65 IN | HEART RATE: 75 BPM | DIASTOLIC BLOOD PRESSURE: 78 MMHG | RESPIRATION RATE: 12 BRPM

## 2021-05-16 VITALS — WEIGHT: 179.12 LBS | HEIGHT: 65 IN | HEART RATE: 84 BPM | OXYGEN SATURATION: 99 % | BODY MASS INDEX: 29.84 KG/M2

## 2021-05-28 VITALS
HEART RATE: 91 BPM | SYSTOLIC BLOOD PRESSURE: 118 MMHG | DIASTOLIC BLOOD PRESSURE: 74 MMHG | HEIGHT: 65 IN | BODY MASS INDEX: 27.9 KG/M2 | WEIGHT: 166 LBS | WEIGHT: 167.44 LBS | HEIGHT: 65 IN | HEART RATE: 96 BPM | BODY MASS INDEX: 27.66 KG/M2 | OXYGEN SATURATION: 97 % | RESPIRATION RATE: 14 BRPM | TEMPERATURE: 97.9 F | SYSTOLIC BLOOD PRESSURE: 112 MMHG | DIASTOLIC BLOOD PRESSURE: 66 MMHG | RESPIRATION RATE: 14 BRPM | TEMPERATURE: 98.7 F | OXYGEN SATURATION: 98 %

## 2021-05-28 NOTE — PROGRESS NOTES
Patient: DAVID FINLEY     Acct: [id number]     Report: #[id number]  UNIT #: [id number]     : 1960    Encounter Date:2019  PRIMARY CARE: KAVYA SHAY  ***Signed***  --------------------------------------------------------------------------------------------------------------------  Chief Complaint      Encounter Date      2019            Primary Care Provider      JONAS HOLLIDAY            Referring Provider      JONAS HOLLIDAY            Patient Complaint      Patient is complaining of      chronic cough            VITALS      Height 5 ft 5 in / 165.1 cm      Weight 166 lbs 0 oz / 75.247940 kg      BSA 1.83 m2      BMI 27.6 kg/m2      Temperature 97.9 F / 36.61 C - Oral      Pulse 91      Respirations 14      Blood Pressure 112/66 Sitting, Right Arm      Pulse Oximetry 98%, roomair      Initial Exhaled Nitrous Oxide      Date:  2019      Exhaled Nitrous Oxide Results:  16            HPI      The patient is a very pleasant 58 year old  female never smoker who had    thymus cancer and had VATS with thymectomy in 2018. She is here today for     shortness of breath.             The patient states she was perfectly fine before her surgery and noted about 1-2    days after her surgery she had progressive shortness of breath. She has been the    same since with no improvement. She gets short of breath with any activity and     can only walk about 100 feet without having to stop because of shortness of     breath. It is severe in severity, worse with exertion relieved with rest. She     also complains of dry cough with no sputum production or hemoptysis. She     complains of wheezing mostly in her left chest. She does have orthopnea as well.    She also states that since the surgery she has had a very poor appetite and     feels full very easily and cannot eat as much as she used to. She denies any     heartburn or acid reflux or abdominal pain. The VATS was done through a  left     sided approach. Chest x-ray in January prior to surgery showed normal lung but     postoperative x-ray in June and CT scan in October show marked elevation of left    hemidiaphragm with most of the contents of the stomach as well as spleen in the     left chest cavity with complete left lower lobe atelectasis. She denies any     nausea and vomiting, fevers or chills, headaches or hemoptysis or chest pain.     She is able to perform her activities of daily living without difficulty and     denies any swollen lymph nodes or glands in her head and neck.             I have personally reviewed the Review of Systems, past family, social, surgical     and medical histories and I agree with the findings.      Copies To:   Jose Colmenares ;            JOHN      Constitutional:  Complains of: Fatigue; Denies: Fever, Weight gain, Weight loss,    Chills, Insomnia, Other      Respiratory/Breathing:  Complains of: Shortness of air, Wheezing, Cough; Denies:    Hemoptysis, Pleuritic pain, Other      Endocrine:  Denies: Polydipsia, Polyuria, Heat/cold intolerance, Abnorml     menstrual pattern, Diabetes, Other      Eyes:  Denies: Blurred vision, Vision Changes, Other      Ears, nose, mouth, throat:  Denies: Mouth lesions, Thrush, Throat pain,     Hoarseness, Allergies/Hay Fever, Post Nasal Drip, Headaches, Recent Head Injury,    Nose Bleeding, Neck Stiffness, Thyroid Mass, Hearing Loss, Ear Fullness, Dry     Mouth, Nasal or Sinus Pain, Dry Lips, Nasal discharge, Nasal congestion, Other      Cardiovascular:  Denies: Palpitations, Syncope, Claudication, Chest Pain, Wake     up Gasping for air, Leg Swelling, Irregular Heart Rate, Cyanosis, Dyspnea on     Exertion, Other      Gastrointestinal:  Denies: Nausea, Constipation, Diarrhea, Abdominal pain,     Vomiting, Difficulty Swallowing, Reflux/Heartburn, Dysphagia, Jaundice,     Bloating, Melena, Bloody stools, Other      Genitourinary:  Denies: Urinary frequency, Incontinence,  Hematuria, Urgency,     Nocturia, Dysuria, Testicular problems, Other      Musculoskeletal:  Denies: Joint Pain, Joint Stiffness, Joint Swelling, Myalgias,    Other      Hematologic/lymphatic:  DENIES: Lymphadenopathy, Bruising, Bleeding tendencies,     Other      Neurological:  Denies: Headache, Numbness, Weakness, Seizures, Other      Psychiatric:  Denies: Anxiety, Appropriate Effect, Depression, Other      Sleep:  No: Excessive daytime sleep, Morning Headache?, Snoring, Insomnia?, Stop    breathing at sleep?, Other      Integumentary:  Denies: Rash, Dry skin, Skin Warm to Touch, Other      Immunologic/Allergic:  Denies: Latex allergy, Seasonal allergies, Asthma,     Urticaria, Eczema, Other      Immunization status:  No: Up to date            FAMILY/SOCIAL/MEDICAL HX      Surgical History:  Yes: Bowel Surgery (COLONOSCOPY), Oral Surgery     (TONSILLECTOMY)      Stroke - Family Hx:  Grandparent      Heart - Family Hx:  Mother, Father, Brother (x2)      Diabetes - Family Hx:  Sister      Cancer/Type - Family Hx:  Mother (breast/ uterine), Aunt, Uncle      Is Father Still Living?:  No      Is Mother Still Living?:  Yes       Family History:  Yes      Social History:  No Tobacco Use, No Alcohol Use, No Recreational Drug use      Smoking status:  Never smoker      Anticoagulation Therapy:  No      Antibiotic Prophylaxis:  No      Medical History:  Yes: Chemotherapy/Cancer (THYMOMA-2018 REMOVED THYMUS GLAND),     Miscellaneous Medical/oth (LEGS SWELLS); No: Blood Disease, Deafness or Ringing     Ears, Hemorrhoids/Rectal Prob, High Blood Pressure, Shortness Of Breath      Psychiatric History      none            PREVENTION      Hx Influenza Vaccination:  Yes (2017)      Date Influenza Vaccine Given:  Dec 1, 2018      2 or More Falls Past Year?:  No      Fall Past Year with Injury?:  No      Hx Pneumococcal Vaccination:  No      Encouraged to follow-up with:  PCP regarding preventative exams.      Chart initiated by       marge/ ma            ALLERGIES/MEDICATIONS      Allergies:        Coded Allergies:             ERYTHROMYCIN BASE (Verified  Allergy, Severe, NAUSEA, 1/22/19)      Medications    Last Reconciled on 1/22/19 10:55 by LAUREN MAN MD      Triamterene/HCTZ (Dyazide 37.5/25 MG) 1 Each Capsule      1 CAP PO QDAY, CAP         Reported         1/22/19       Cyanocobalamin (Cyanocobalamin Injection) 1,000 Mcg/1 Ml Vial      1000 MCG IM Q30DAY, #1 VIAL         Reported         9/7/18       Ergocalciferol (Vitamin D2) 50,000 Unit Capsule      27212 UNITS PO [id number] for 30 Days, #4 CAP         Reported         9/7/18       Levothyroxine (Synthroid) 0.05 Mg      0.05 MG PO QDAY@07, #30 TAB 0 Refills         Reported         9/7/18      Current Medications      Current Medications Reviewed 1/22/19            EXAM      Vital Signs Reviewed      Gen: WDWN, Alert, NAD.        HEENT:  PERRL, EOMI.  OP, nares clear, no sinus tenderness.      Neck:  Supple, no JVD, no thyromegaly.      Lymph: No axillary, cervical, supraclavicular lymphadenopathy noted bilaterally.      Chest:  Good aeration, clear to auscultation on right lung however diminished     throughout the entire left lung with bowel and stomach gurgling sounds heard at     the left base posteriorly, tympanic to percussion bilaterally, no work of     breathing noted.      CV:  RRR, no MGR, pulses 2+, equal.      Abd:  Soft, NT, ND, + BS, no HSM.      EXT:  No clubbing, no cyanosis, no edema, no joint tenderness.       Neuro:  A  Skin: No rashes or lesions.      Vtials      Vitals:             Height 5 ft 5 in / 165.1 cm           Weight 166 lbs 0 oz / 75.861531 kg           BSA 1.83 m2           BMI 27.6 kg/m2           Temperature 97.9 F / 36.61 C - Oral           Pulse 91           Respirations 14           Blood Pressure 112/66 Sitting, Right Arm           Pulse Oximetry 98%, roomair            REVIEW      Results Reviewed      PCCS Results Reviewed?:  Yes Prev  Radiology Results, Yes Previous [facility] Records    (I personally reviewed the patient's office notes from her referring provider. )      Radiographic Results      I personally reviewed the patient's multiple chest x-rays showing normal chest     x-ray in January and May 2018. October and June 2018 chest x-ray showed marked     elevation of left hemidiaphragm with complete atelectasis of left lung with     spleen and stomach contents in left chest cavity. I also reviewed CT scan of the    chest, abdomen and pelvis showing complete atelectasis of left lower lobe with     marked hemidiaphragm elevation with spleen and entire contents of stomach in the    left pleural cavity.            Assessment      ORNELAS (dyspnea on exertion) - R06.09            Cough - R05            Orthopnea - R06.01            Diaphragm paralysis - J98.6            Notes      New Medications      * Triamterene/HCTZ (Dyazide 37.5/25 MG) 1 EACH CAPSULE: 1 CAP PO QDAY      Discontinued Medications      * Docusate Sodium 100 MG CAPSULE: 100 MG PO BID #60      * PSYLLIUM HUSK (Metamucil) 0.52 GM CAPSULE: 1 CAP PO QDAY #30      New Diagnostics      * 6 Min Walk With Pulse Ox, Routine         Dx: ORNELAS (dyspnea on exertion) - R06.09      * PFT-Comp, PrePost,DLCO,BodyBox, Week         Dx: ORNELAS (dyspnea on exertion) - R06.09      * Chest Fluoro Sniff Test, Routine         Dx: ORNELAS (dyspnea on exertion) - R06.09      IMPRESSION:      1. Dyspnea on exertion .       2. Chronic cough.       3. Orthopnea.       4. Elevated left hemidiaphragm with entire contents of spleen and stomach in the    left chest. 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       5. History of thymus cancer status post thymectomy.             PLAN:      1.  I performed exhaled nitrous oxide testing in the office today.  Her level is    16 indicative of no eosinophilic airway inflammation.       2. The pre and postsurgical x-rays of this patient was reviewed and this patient     appears to have left diaphragm paralysis that has not improved since her surgery    in June. This is likely secondary to a phrenic nerve injury versus possible     phrenic nerve ligation during thymus surgery. I discussed with the patient that     this is not an uncommon injury and she is aware of this.       3. I will get pulmonary function tests and six minute walk test to assess for     exertional hypoxemia as well as residual lung function.       4. I will send the patient for sniff test to evaluate for diaphragmatic     paralysis.       5. The patient sees Dr. Colmenares, her thoracic surgeon on 02/04/19 and I will     send records to him and she will discuss this with him.  Given her feeling of     early fullness and marked limitation in respiratory status since having surgery,    I have encouraged her to discuss with him having repair with a possible     plication. She is amendable for this and will discuss this with him.       6. The patient is up to date with flu vaccine, no indication for Prevnar or     Pneumovax.       7. I will follow up with the patient in 1 month to discuss test results as well     as discuss her follow up evaluation with Dr. Colmenares.            Patient Education      Other Education:  diaphragmatic paralysis                 Disclaimer: Converted document may not contain table formatting or lab diagrams. Please see [employer] for the authenticated document.

## 2021-05-28 NOTE — PROGRESS NOTES
Patient: DAVID FINLEY     Acct: [id number]     Report: #[id number]  UNIT #: [id number]     : 1960    Encounter Date:05/10/2019  PRIMARY CARE: KAVYA SHAY  ***Signed***  --------------------------------------------------------------------------------------------------------------------  Chief Complaint      Encounter Date      May 10, 2019            Primary Care Provider      KAVYA SHAY            Referring Provider      JONAS HOLLIDAY            Patient Complaint      Patient is complaining of      F/U SOA            VITALS      Height 5 ft 5.00 in / 165.1 cm      Weight 167 lbs 7.000 oz / 75.048138 kg      BSA 1.83 m2      BMI 27.9 kg/m2      Temperature 98.7 F / 37.06 C - Oral      Pulse 96      Respirations 14      Blood Pressure 118/74 Sitting, Left Arm      Pulse Oximetry 97%, roomair      Initial Exhaled Nitrous Oxide      Date:  2019      Exhaled Nitrous Oxide Results:  16            HPI      The patient is a very pleasant 58 year old  female never smoker with     thymus cancer status post VATS with thymectomy in 2018 here for follow up.             The last time I saw her she had a significant left diaphragm paralysis secondary    to postoperative changes. She saw her thoracic surgeon Dr. Hankins who     repaired it. From her description, he did a plication with over-sewing of the     phrenic nerve. I will get these notes for details. She feels much better since     her last visit. She denies any dyspnea, cough, wheezing, chest pain or     hemoptysis. She denies any nausea or vomiting, fever or chills or headaches. She    is able able to perform his ADL's without difficulty and denies any swollen     glands in her lymph nodes, head or neck.            I personally reviewed Review of Systems, family, social, surgical and medical     history and agree with their findings.            ROS      Constitutional:  Denies: Fatigue, Fever, Weight gain, Weight loss, Chills,      Insomnia, Other      Respiratory/Breathing:  Complains of: Shortness of air; Denies: Wheezing, Cough,    Hemoptysis, Pleuritic pain, Other      Endocrine:  Denies: Polydipsia, Polyuria, Heat/cold intolerance, Abnorml     menstrual pattern, Diabetes, Other      Eyes:  Denies: Blurred vision, Vision Changes, Other      Ears, nose, mouth, throat:  Denies: Mouth lesions, Thrush, Throat pain,     Hoarseness, Allergies/Hay Fever, Post Nasal Drip, Headaches, Recent Head Injury,    Nose Bleeding, Neck Stiffness, Thyroid Mass, Hearing Loss, Ear Fullness, Dry     Mouth, Nasal or Sinus Pain, Dry Lips, Nasal discharge, Nasal congestion, Other      Cardiovascular:  Complains of: Dyspnea on Exertion; Denies: Palpitations,     Syncope, Claudication, Chest Pain, Wake up Gasping for air, Leg Swelling,     Irregular Heart Rate, Cyanosis, Other      Gastrointestinal:  Denies: Nausea, Constipation, Diarrhea, Abdominal pain,     Vomiting, Difficulty Swallowing, Reflux/Heartburn, Dysphagia, Jaundice,     Bloating, Melena, Bloody stools, Other      Genitourinary:  Denies: Urinary frequency, Incontinence, Hematuria, Urgency,     Nocturia, Dysuria, Testicular problems, Other      Musculoskeletal:  Denies: Joint Pain, Joint Stiffness, Joint Swelling, Myalgias,    Other      Hematologic/lymphatic:  DENIES: Lymphadenopathy, Bruising, Bleeding tendencies,     Other      Neurological:  Denies: Headache, Numbness, Weakness, Seizures, Other      Psychiatric:  Denies: Anxiety, Appropriate Effect, Depression, Other      Sleep:  No: Excessive daytime sleep, Morning Headache?, Snoring, Insomnia?, Stop    breathing at sleep?, Other      Integumentary:  Denies: Rash, Dry skin, Skin Warm to Touch, Other      Immunologic/Allergic:  Denies: Latex allergy, Seasonal allergies, Asthma,     Urticaria, Eczema, Other      Immunization status:  No: Up to date            FAMILY/SOCIAL/MEDICAL HX      Surgical History:  Yes: Bowel Surgery (COLONOSCOPY), Oral  Surgery     (TONSILLECTOMY)      Stroke - Family Hx:  Grandparent      Heart - Family Hx:  Mother, Father, Brother (x2)      Diabetes - Family Hx:  Sister      Cancer/Type - Family Hx:  Mother (breast/ uterine), Aunt, Uncle      Is Father Still Living?:  No      Is Mother Still Living?:  Yes       Family History:  Yes      Social History:  No Tobacco Use, No Alcohol Use, No Recreational Drug use      Smoking status:  Never smoker      Anticoagulation Therapy:  No      Antibiotic Prophylaxis:  No      Medical History:  Yes: Chemotherapy/Cancer (THYMOMA-2018 REMOVED THYMUS GLAND),     Miscellaneous Medical/oth (LEGS SWELLS); No: Blood Disease, Deafness or Ringing     Ears, Hemorrhoids/Rectal Prob, High Blood Pressure, Shortness Of Breath, Sinus     Trouble      Psychiatric History      NONE            PREVENTION      Hx Influenza Vaccination:  Yes      Date Influenza Vaccine Given:  Dec 1, 2018      2 or More Falls Past Year?:  No      Fall Past Year with Injury?:  No      Hx Pneumococcal Vaccination:  Yes      Encouraged to follow-up with:  PCP regarding preventative exams.      Chart initiated by      JULITO/ MA            ALLERGIES/MEDICATIONS      Allergies:        Coded Allergies:             ERYTHROMYCIN BASE (Verified  Allergy, Severe, NAUSEA, 5/10/19)      Medications    Last Reconciled on 5/10/19 16:02 by LAUREN MAN MD      (b12)   No Conflict Check               Reported         5/10/19       Triamterene/HCTZ (Dyazide 37.5/25 MG) 1 Each Capsule      1 CAP PO QDAY, CAP         Reported         1/22/19       Ergocalciferol (Vitamin D2) 50,000 Unit Capsule      04512 UNITS PO [id number] for 30 Days, #4 CAP         Reported         9/7/18       Levothyroxine (Synthroid) 0.05 Mg      0.05 MG PO QDAY@07, #30 TAB 0 Refills         Reported         9/7/18      Current Medications      Current Medications Reviewed 5/10/19            EXAM      Vital Signs Reviewed      Gen: WDWN, Alert, NAD.        HEENT:   PERRL, EOMI.  OP, nares clear, no sinus tenderness.      Chest:  Good aeration, diminished left chest with left chest crackles markedly     improved from previous exam, tympanic to percussion bilaterally, no work of     breathing noted.      CV:  RRR, no MGR, pulses 2+, equal.      Abd:  Soft, NT, ND, + BS, no HSM.      EXT:  No clubbing, no cyanosis, no edema.       Neuro:  A  Skin: No rashes or lesions.      Vtials      Vitals:             Height 5 ft 5.00 in / 165.1 cm           Weight 167 lbs 7.000 oz / 75.702416 kg           BSA 1.83 m2           BMI 27.9 kg/m2           Temperature 98.7 F / 37.06 C - Oral           Pulse 96           Respirations 14           Blood Pressure 118/74 Sitting, Left Arm           Pulse Oximetry 97%, roomair            REVIEW      Results Reviewed      PCCS Results Reviewed?:  Yes Previous [facility] Records            Assessment      Diaphragm paralysis - J98.6            Notes      New Medications      * (b12):       New Diagnostics      * Chest 2 View, As Soon As Possible         Dx: Diaphragm paralysis - J98.6      IMPRESSION:      1. Postoperative left hemidiaphram paralysis status post surgical repair with     likely plication.      2. Dyspnea on exertion resolved.       3. Cough resolved.       4. Orthopnea resolved.       5. History of thymus cancer status post thymectomy.            PLAN:      1. I appreciate assistance from Dr. Meadows repairing diaphragm paralysis.       2. We will check a chest x-ray now to confirm improvement in diaphragm     paralysis.       3. Up to date with flu vaccine with no indication for Prevnar and pneumovax.       4. Follow up with me in 1-2 months.                 Disclaimer: Converted document may not contain table formatting or lab diagrams. Please see [employer] for the authenticated document.

## 2021-06-15 DIAGNOSIS — E55.9 VITAMIN D DEFICIENCY: Primary | ICD-10-CM

## 2021-06-17 RX ORDER — ERGOCALCIFEROL 1.25 MG/1
50000 CAPSULE ORAL
COMMUNITY
Start: 2021-05-12 | End: 2021-09-08 | Stop reason: SDUPTHER

## 2021-06-17 RX ORDER — ERGOCALCIFEROL 1.25 MG/1
CAPSULE ORAL
Qty: 13 CAPSULE | Refills: 0 | Status: SHIPPED | OUTPATIENT
Start: 2021-06-17 | End: 2021-09-22

## 2021-07-16 ENCOUNTER — HOSPITAL ENCOUNTER (OUTPATIENT)
Dept: OTHER | Facility: HOSPITAL | Age: 61
Discharge: HOME | End: 2021-07-16

## 2021-09-07 RX ORDER — TAMOXIFEN CITRATE 20 MG/1
TABLET ORAL
COMMUNITY
End: 2021-10-07

## 2021-09-07 RX ORDER — HYDROCHLOROTHIAZIDE 12.5 MG/1
TABLET ORAL
COMMUNITY
End: 2021-09-08 | Stop reason: SDUPTHER

## 2021-09-08 ENCOUNTER — OFFICE VISIT (OUTPATIENT)
Dept: INTERNAL MEDICINE | Facility: CLINIC | Age: 61
End: 2021-09-08

## 2021-09-08 VITALS
BODY MASS INDEX: 28.99 KG/M2 | SYSTOLIC BLOOD PRESSURE: 130 MMHG | TEMPERATURE: 97.4 F | OXYGEN SATURATION: 97 % | HEIGHT: 65 IN | DIASTOLIC BLOOD PRESSURE: 78 MMHG | WEIGHT: 174 LBS | HEART RATE: 97 BPM

## 2021-09-08 DIAGNOSIS — E55.9 VITAMIN D DEFICIENCY: ICD-10-CM

## 2021-09-08 DIAGNOSIS — E53.8 VITAMIN B12 DEFICIENCY: ICD-10-CM

## 2021-09-08 DIAGNOSIS — I10 ESSENTIAL HYPERTENSION: Primary | ICD-10-CM

## 2021-09-08 DIAGNOSIS — E03.9 HYPOTHYROIDISM, UNSPECIFIED TYPE: ICD-10-CM

## 2021-09-08 DIAGNOSIS — J98.6 DISORDER OF DIAPHRAGM: ICD-10-CM

## 2021-09-08 DIAGNOSIS — D49.89 THYMOMA: ICD-10-CM

## 2021-09-08 DIAGNOSIS — R13.19 ESOPHAGEAL DYSPHAGIA: ICD-10-CM

## 2021-09-08 PROBLEM — N60.99 ATYPICAL DUCTAL HYPERPLASIA OF BREAST: Status: ACTIVE | Noted: 2021-09-08

## 2021-09-08 PROBLEM — S86.819A RUPTURE OF PATELLAR TENDON: Status: ACTIVE | Noted: 2018-08-30

## 2021-09-08 PROBLEM — R53.83 FATIGUE: Status: ACTIVE | Noted: 2021-09-08

## 2021-09-08 PROBLEM — R05.3 CHRONIC COUGH: Status: ACTIVE | Noted: 2021-09-08

## 2021-09-08 PROBLEM — R60.0 EDEMA OF LOWER EXTREMITY: Status: ACTIVE | Noted: 2021-09-08

## 2021-09-08 PROBLEM — R68.81 EARLY SATIETY: Status: ACTIVE | Noted: 2021-09-08

## 2021-09-08 PROBLEM — M25.561 RIGHT KNEE PAIN: Status: ACTIVE | Noted: 2018-08-30

## 2021-09-08 PROBLEM — K59.00 CONSTIPATION: Status: ACTIVE | Noted: 2021-09-08

## 2021-09-08 LAB
25(OH)D3 SERPL-MCNC: 42 NG/ML
ALBUMIN SERPL-MCNC: 4.3 G/DL (ref 3.5–5.2)
ALBUMIN/GLOB SERPL: 1.5 G/DL
ALP SERPL-CCNC: 62 U/L (ref 39–117)
ALT SERPL W P-5'-P-CCNC: 11 U/L (ref 1–33)
ANION GAP SERPL CALCULATED.3IONS-SCNC: 7.7 MMOL/L (ref 5–15)
AST SERPL-CCNC: 17 U/L (ref 1–32)
BASOPHILS # BLD AUTO: 0.1 10*3/MM3 (ref 0–0.2)
BASOPHILS NFR BLD AUTO: 1.5 % (ref 0–1.5)
BILIRUB SERPL-MCNC: 0.4 MG/DL (ref 0–1.2)
BUN SERPL-MCNC: 16 MG/DL (ref 6–20)
BUN/CREAT SERPL: 13.6 (ref 7–25)
CALCIUM SPEC-SCNC: 9.5 MG/DL (ref 8.6–10.5)
CHLORIDE SERPL-SCNC: 103 MMOL/L (ref 98–107)
CHOLEST SERPL-MCNC: 241 MG/DL (ref 0–200)
CO2 SERPL-SCNC: 28.3 MMOL/L (ref 22–29)
CREAT SERPL-MCNC: 1.18 MG/DL (ref 0.57–1)
DEPRECATED RDW RBC AUTO: 42.9 FL (ref 37–54)
EOSINOPHIL # BLD AUTO: 0.28 10*3/MM3 (ref 0–0.4)
EOSINOPHIL NFR BLD AUTO: 4.2 % (ref 0.3–6.2)
ERYTHROCYTE [DISTWIDTH] IN BLOOD BY AUTOMATED COUNT: 12.8 % (ref 12.3–15.4)
GFR SERPL CREATININE-BSD FRML MDRD: 47 ML/MIN/1.73
GLOBULIN UR ELPH-MCNC: 2.8 GM/DL
GLUCOSE SERPL-MCNC: 93 MG/DL (ref 65–99)
HCT VFR BLD AUTO: 45.8 % (ref 34–46.6)
HDLC SERPL-MCNC: 48 MG/DL (ref 40–60)
HGB BLD-MCNC: 14.6 G/DL (ref 12–15.9)
IMM GRANULOCYTES # BLD AUTO: 0.02 10*3/MM3 (ref 0–0.05)
IMM GRANULOCYTES NFR BLD AUTO: 0.3 % (ref 0–0.5)
LDLC SERPL CALC-MCNC: 164 MG/DL (ref 0–100)
LDLC/HDLC SERPL: 3.35 {RATIO}
LYMPHOCYTES # BLD AUTO: 2.28 10*3/MM3 (ref 0.7–3.1)
LYMPHOCYTES NFR BLD AUTO: 34.5 % (ref 19.6–45.3)
MCH RBC QN AUTO: 29.3 PG (ref 26.6–33)
MCHC RBC AUTO-ENTMCNC: 31.9 G/DL (ref 31.5–35.7)
MCV RBC AUTO: 91.8 FL (ref 79–97)
MONOCYTES # BLD AUTO: 0.39 10*3/MM3 (ref 0.1–0.9)
MONOCYTES NFR BLD AUTO: 5.9 % (ref 5–12)
NEUTROPHILS NFR BLD AUTO: 3.53 10*3/MM3 (ref 1.7–7)
NEUTROPHILS NFR BLD AUTO: 53.6 % (ref 42.7–76)
NRBC BLD AUTO-RTO: 0.2 /100 WBC (ref 0–0.2)
PLATELET # BLD AUTO: 363 10*3/MM3 (ref 140–450)
PMV BLD AUTO: 10.1 FL (ref 6–12)
POTASSIUM SERPL-SCNC: 4.1 MMOL/L (ref 3.5–5.2)
PROT SERPL-MCNC: 7.1 G/DL (ref 6–8.5)
RBC # BLD AUTO: 4.99 10*6/MM3 (ref 3.77–5.28)
SODIUM SERPL-SCNC: 139 MMOL/L (ref 136–145)
TRIGL SERPL-MCNC: 161 MG/DL (ref 0–150)
TSH SERPL DL<=0.05 MIU/L-ACNC: 3.14 UIU/ML (ref 0.27–4.2)
VIT B12 BLD-MCNC: 430 PG/ML (ref 211–946)
VLDLC SERPL-MCNC: 29 MG/DL (ref 5–40)
WBC # BLD AUTO: 6.6 10*3/MM3 (ref 3.4–10.8)

## 2021-09-08 PROCEDURE — 84443 ASSAY THYROID STIM HORMONE: CPT | Performed by: INTERNAL MEDICINE

## 2021-09-08 PROCEDURE — 99213 OFFICE O/P EST LOW 20 MIN: CPT | Performed by: INTERNAL MEDICINE

## 2021-09-08 PROCEDURE — 85025 COMPLETE CBC W/AUTO DIFF WBC: CPT | Performed by: INTERNAL MEDICINE

## 2021-09-08 PROCEDURE — 82607 VITAMIN B-12: CPT | Performed by: INTERNAL MEDICINE

## 2021-09-08 PROCEDURE — 80053 COMPREHEN METABOLIC PANEL: CPT | Performed by: INTERNAL MEDICINE

## 2021-09-08 PROCEDURE — 36415 COLL VENOUS BLD VENIPUNCTURE: CPT | Performed by: INTERNAL MEDICINE

## 2021-09-08 PROCEDURE — 80061 LIPID PANEL: CPT | Performed by: INTERNAL MEDICINE

## 2021-09-08 PROCEDURE — 82306 VITAMIN D 25 HYDROXY: CPT | Performed by: INTERNAL MEDICINE

## 2021-09-08 RX ORDER — TRIAMTERENE AND HYDROCHLOROTHIAZIDE 37.5; 25 MG/1; MG/1
0.5 TABLET ORAL DAILY
Qty: 45 TABLET | Refills: 1 | Status: SHIPPED | OUTPATIENT
Start: 2021-09-08 | End: 2022-05-11 | Stop reason: SDUPTHER

## 2021-09-08 RX ORDER — AMMONIUM LACTATE 12 G/100G
LOTION TOPICAL AS NEEDED
Qty: 225 G | Refills: 1 | Status: SHIPPED | OUTPATIENT
Start: 2021-09-08 | End: 2022-05-11 | Stop reason: SDUPTHER

## 2021-09-08 RX ORDER — LEVOTHYROXINE SODIUM 0.05 MG/1
50 TABLET ORAL DAILY
Qty: 90 TABLET | Refills: 1 | Status: SHIPPED | OUTPATIENT
Start: 2021-09-08 | End: 2022-05-11 | Stop reason: SDUPTHER

## 2021-09-08 RX ORDER — TRIAMTERENE AND HYDROCHLOROTHIAZIDE 37.5; 25 MG/1; MG/1
TABLET ORAL
COMMUNITY
Start: 2021-07-12 | End: 2021-09-08

## 2021-09-08 RX ORDER — HYDROCHLOROTHIAZIDE 12.5 MG/1
12.5 TABLET ORAL DAILY
Qty: 90 TABLET | Refills: 1 | Status: SHIPPED | OUTPATIENT
Start: 2021-09-08 | End: 2021-09-08 | Stop reason: ALTCHOICE

## 2021-09-08 RX ORDER — HYDROCHLOROTHIAZIDE 12.5 MG/1
12.5 TABLET ORAL DAILY
Qty: 90 TABLET | Refills: 1 | Status: SHIPPED | OUTPATIENT
Start: 2021-09-08 | End: 2021-09-08

## 2021-09-08 NOTE — ASSESSMENT & PLAN NOTE
Well-controlled today in clinic  Taking medication as prescribed  We will refill medications today  Taking monitoring labs

## 2021-09-08 NOTE — PROGRESS NOTES
"Chief Complaint  Follow-up and Med refills    Subjective          Oksana Smith presents to [facility]  HTN:  well controlled today, doing well on medication, denies headache, chest pain, dizziness, vision changes    Wants referral to Dr. Mendez    Needs med refills    Due for labs      Objective   Vital Signs:   /78   Pulse 97   Temp 97.4 °F (36.3 °C)   Ht 165.1 cm (65\")   Wt 78.9 kg (174 lb)   SpO2 97%   BMI 28.96 kg/m²     Physical Exam  Vitals reviewed.   Constitutional:       Appearance: Normal appearance. She is well-developed.   HENT:      Head: Normocephalic and atraumatic.      Mouth/Throat:      Pharynx: No oropharyngeal exudate.   Eyes:      Conjunctiva/sclera: Conjunctivae normal.      Pupils: Pupils are equal, round, and reactive to light.   Neck:      Thyroid: No thyroid mass, thyromegaly or thyroid tenderness.   Cardiovascular:      Rate and Rhythm: Normal rate and regular rhythm.      Heart sounds: No murmur heard.   No friction rub. No gallop.    Pulmonary:      Effort: Pulmonary effort is normal.      Breath sounds: Normal breath sounds. No wheezing or rhonchi.   Abdominal:      General: There is no distension.      Tenderness: There is no abdominal tenderness.   Lymphadenopathy:      Cervical: No cervical adenopathy.   Skin:     General: Skin is warm and dry.   Neurological:      Mental Status: She is alert and oriented to person, place, and time.   Psychiatric:         Mood and Affect: Affect normal.        Result Review :   The following data was reviewed by: Portia Serna MD on 09/08/2021:  CMP    CMP 3/5/21   Glucose 91   BUN 12   Creatinine 0.90   Sodium 144   Potassium 3.5   Chloride 103   Calcium 9.5   Albumin 4.1   Total Bilirubin 0.52   Alkaline Phosphatase 54   AST (SGOT) 13 (A)   ALT (SGPT) 8 (A)   (A) Abnormal value            CBC    CBC 3/5/21   WBC 5.93   RBC 4.96   Hemoglobin 14.3   Hematocrit 43.7 "   MCV 88.1   MCH 28.8   MCHC 32.7 (A)   RDW 12.9   Platelets 356   (A) Abnormal value            Lipid Panel    Lipid Panel 3/5/21   Total Cholesterol 220 (A)   Triglycerides 165 (A)   HDL Cholesterol 47   VLDL Cholesterol 33   LDL Cholesterol  140 (A)   (A) Abnormal value       Comments are available for some flowsheets but are not being displayed.           TSH    TSH 3/5/21   TSH 1.180              Procedures      Assessment and Plan    Diagnoses and all orders for this visit:    1. Essential hypertension (Primary)  Assessment & Plan:  Well-controlled today in clinic  Taking medication as prescribed  We will refill medications today  Taking monitoring labs    Orders:  -     Comprehensive Metabolic Panel  -     CBC & Differential  -     TSH  -     Lipid Panel    2. Hypothyroidism, unspecified type  Assessment & Plan:  Doing well on Synthroid  Checking labs today    Orders:  -     Comprehensive Metabolic Panel  -     CBC & Differential  -     TSH  -     Lipid Panel    3. Vitamin B12 deficiency  -     Vitamin B12    4. Vitamin D deficiency  -     Vitamin D 25 Hydroxy    5. Esophageal dysphagia  -     Ambulatory Referral to Cardiothoracic Surgery    6. Thymoma  -     Ambulatory Referral to Cardiothoracic Surgery    7. Disorder of diaphragm  -     Ambulatory Referral to Cardiothoracic Surgery    Other orders  -     Discontinue: hydroCHLOROthiazide (HYDRODIURIL) 12.5 MG tablet; Take 1 tablet by mouth Daily.  Dispense: 90 tablet; Refill: 1  -     levothyroxine (Synthroid) 50 MCG tablet; Take 1 tablet by mouth Daily.  Dispense: 90 tablet; Refill: 1  -     hydroCHLOROthiazide (HYDRODIURIL) 12.5 MG tablet; Take 1 tablet by mouth Daily.  Dispense: 90 tablet; Refill: 1      Follow Up   Return in about 6 months (around 3/8/2022) for Next scheduled follow up.  Patient was given instructions and counseling regarding her condition or for health maintenance advice. Please see specific information pulled into the AVS if  appropriate.

## 2021-09-22 DIAGNOSIS — E55.9 VITAMIN D DEFICIENCY: ICD-10-CM

## 2021-09-22 RX ORDER — ERGOCALCIFEROL 1.25 MG/1
CAPSULE ORAL
Qty: 13 CAPSULE | Refills: 0 | Status: SHIPPED | OUTPATIENT
Start: 2021-09-22 | End: 2021-12-20

## 2021-10-07 ENCOUNTER — TELEPHONE (OUTPATIENT)
Dept: INTERNAL MEDICINE | Facility: CLINIC | Age: 61
End: 2021-10-07

## 2021-10-07 ENCOUNTER — OFFICE VISIT (OUTPATIENT)
Dept: INTERNAL MEDICINE | Facility: CLINIC | Age: 61
End: 2021-10-07

## 2021-10-07 VITALS
SYSTOLIC BLOOD PRESSURE: 124 MMHG | BODY MASS INDEX: 28.59 KG/M2 | OXYGEN SATURATION: 98 % | HEIGHT: 65 IN | DIASTOLIC BLOOD PRESSURE: 82 MMHG | WEIGHT: 171.6 LBS | HEART RATE: 99 BPM | TEMPERATURE: 99.1 F

## 2021-10-07 DIAGNOSIS — H66.90 ACUTE OTITIS MEDIA, UNSPECIFIED OTITIS MEDIA TYPE: Primary | ICD-10-CM

## 2021-10-07 PROCEDURE — 99213 OFFICE O/P EST LOW 20 MIN: CPT | Performed by: NURSE PRACTITIONER

## 2021-10-07 RX ORDER — NEOMYCIN SULFATE, POLYMYXIN B SULFATE AND HYDROCORTISONE 10; 3.5; 1 MG/ML; MG/ML; [USP'U]/ML
SUSPENSION/ DROPS AURICULAR (OTIC)
COMMUNITY
Start: 2021-10-01

## 2021-10-07 RX ORDER — AMOXICILLIN 875 MG/1
875 TABLET, COATED ORAL 2 TIMES DAILY
Qty: 14 TABLET | Refills: 0 | Status: SHIPPED | OUTPATIENT
Start: 2021-10-07 | End: 2021-10-14

## 2021-10-07 NOTE — PROGRESS NOTES
"Chief Complaint  Earache (right, onset: over a week ago)    Subjective          Oksana Smith presents to [facility]  Patient reports ear pain x10 days.  [facility] and diagnosed with external [facility] infection and prescribed eardrops.  Patient reports that the eardrops did not help to improve symptoms.  Right ear pain persist, has not started itching.  Denies fever, cough, congestion, runny nose, allergies, tragal pain.  Denies sick contacts.  Denies known COVID-19 exposures.  Patient reports at times it feels like she is underwater, other times it is just painful.      Objective   Vital Signs:   /82 (BP Location: Left arm, Patient Position: Sitting, Cuff Size: Large Adult)   Pulse 99   Temp 99.1 °F (37.3 °C) (Temporal)   Ht 165.1 cm (65\")   Wt 77.8 kg (171 lb 9.6 oz)   SpO2 98%   BMI 28.56 kg/m²     Physical Exam  Constitutional:       Appearance: Normal appearance. She is normal weight.   HENT:      Head: Normocephalic and atraumatic.      Right Ear: Tympanic membrane is erythematous and bulging.      Left Ear: Tympanic membrane normal.      Ears:      Comments: Without tragal pain bilaterally      Nose: Nose normal.      Mouth/Throat:      Mouth: Mucous membranes are moist.      Pharynx: Oropharynx is clear.   Eyes:      Extraocular Movements: Extraocular movements intact.      Conjunctiva/sclera: Conjunctivae normal.      Pupils: Pupils are equal, round, and reactive to light.   Cardiovascular:      Rate and Rhythm: Normal rate and regular rhythm.      Heart sounds: Normal heart sounds.   Pulmonary:      Effort: Pulmonary effort is normal.      Breath sounds: Normal breath sounds.   Skin:     General: Skin is warm and dry.   Neurological:      General: No focal deficit present.      Mental Status: She is alert and oriented to person, place, and time.   Psychiatric:         Mood and Affect: Mood normal.         Behavior: " Behavior normal.         Thought Content: Thought content normal.        Result Review :                 Assessment and Plan    Diagnoses and all orders for this visit:    1. Acute otitis media, unspecified otitis media type (Primary)  Assessment & Plan:  Exam reassuring, lung sounds clear, O2 sat 90%.  Will treat with amoxicillin at this time.  Patient to call or return to clinic if symptoms worsen or persist.      Other orders  -     amoxicillin (AMOXIL) 875 MG tablet; Take 1 tablet by mouth 2 (Two) Times a Day for 7 days.  Dispense: 14 tablet; Refill: 0      Follow Up   Return if symptoms worsen or fail to improve.  Patient was given instructions and counseling regarding her condition or for health maintenance advice. Please see specific information pulled into the AVS if appropriate.

## 2021-10-07 NOTE — ASSESSMENT & PLAN NOTE
Exam reassuring, lung sounds clear, O2 sat 90%.  Will treat with amoxicillin at this time.  Patient to call or return to clinic if symptoms worsen or persist.

## 2021-12-19 DIAGNOSIS — E55.9 VITAMIN D DEFICIENCY: ICD-10-CM

## 2021-12-20 RX ORDER — ERGOCALCIFEROL 1.25 MG/1
CAPSULE ORAL
Qty: 13 CAPSULE | Refills: 0 | Status: SHIPPED | OUTPATIENT
Start: 2021-12-20 | End: 2022-03-16

## 2022-03-16 DIAGNOSIS — E55.9 VITAMIN D DEFICIENCY: ICD-10-CM

## 2022-03-16 RX ORDER — ERGOCALCIFEROL 1.25 MG/1
CAPSULE ORAL
Qty: 13 CAPSULE | Refills: 0 | Status: SHIPPED | OUTPATIENT
Start: 2022-03-16 | End: 2022-05-11 | Stop reason: SDUPTHER

## 2022-05-11 ENCOUNTER — OFFICE VISIT (OUTPATIENT)
Dept: INTERNAL MEDICINE | Facility: CLINIC | Age: 62
End: 2022-05-11

## 2022-05-11 ENCOUNTER — PATIENT MESSAGE (OUTPATIENT)
Dept: INTERNAL MEDICINE | Facility: CLINIC | Age: 62
End: 2022-05-11

## 2022-05-11 VITALS
WEIGHT: 155.2 LBS | HEIGHT: 65 IN | SYSTOLIC BLOOD PRESSURE: 122 MMHG | BODY MASS INDEX: 25.86 KG/M2 | TEMPERATURE: 96.8 F | DIASTOLIC BLOOD PRESSURE: 68 MMHG | OXYGEN SATURATION: 97 % | RESPIRATION RATE: 18 BRPM | HEART RATE: 79 BPM

## 2022-05-11 DIAGNOSIS — E53.8 VITAMIN B12 DEFICIENCY: ICD-10-CM

## 2022-05-11 DIAGNOSIS — E55.9 VITAMIN D DEFICIENCY: Primary | ICD-10-CM

## 2022-05-11 DIAGNOSIS — I10 PRIMARY HYPERTENSION: ICD-10-CM

## 2022-05-11 DIAGNOSIS — E03.9 HYPOTHYROIDISM, UNSPECIFIED TYPE: ICD-10-CM

## 2022-05-11 LAB
25(OH)D3 SERPL-MCNC: 48.8 NG/ML (ref 30–100)
ALBUMIN SERPL-MCNC: 3.9 G/DL (ref 3.5–5.2)
ALBUMIN/GLOB SERPL: 1.2 G/DL
ALP SERPL-CCNC: 56 U/L (ref 39–117)
ALT SERPL W P-5'-P-CCNC: 11 U/L (ref 1–33)
ANION GAP SERPL CALCULATED.3IONS-SCNC: 10.6 MMOL/L (ref 5–15)
AST SERPL-CCNC: 17 U/L (ref 1–32)
BASOPHILS # BLD AUTO: 0.08 10*3/MM3 (ref 0–0.2)
BASOPHILS NFR BLD AUTO: 1.2 % (ref 0–1.5)
BILIRUB SERPL-MCNC: 0.6 MG/DL (ref 0–1.2)
BUN SERPL-MCNC: 14 MG/DL (ref 6–20)
BUN/CREAT SERPL: 11.9 (ref 7–25)
CALCIUM SPEC-SCNC: 10 MG/DL (ref 8.6–10.5)
CHLORIDE SERPL-SCNC: 102 MMOL/L (ref 98–107)
CHOLEST SERPL-MCNC: 232 MG/DL (ref 0–200)
CO2 SERPL-SCNC: 27.4 MMOL/L (ref 22–29)
CREAT SERPL-MCNC: 1.18 MG/DL (ref 0.57–1)
DEPRECATED RDW RBC AUTO: 42.2 FL (ref 37–54)
EGFRCR SERPLBLD CKD-EPI 2021: 53.3 ML/MIN/1.73
EOSINOPHIL # BLD AUTO: 0.28 10*3/MM3 (ref 0–0.4)
EOSINOPHIL NFR BLD AUTO: 4.4 % (ref 0.3–6.2)
ERYTHROCYTE [DISTWIDTH] IN BLOOD BY AUTOMATED COUNT: 12.8 % (ref 12.3–15.4)
GLOBULIN UR ELPH-MCNC: 3.2 GM/DL
GLUCOSE SERPL-MCNC: 85 MG/DL (ref 65–99)
HCT VFR BLD AUTO: 43.1 % (ref 34–46.6)
HDLC SERPL-MCNC: 52 MG/DL (ref 40–60)
HGB BLD-MCNC: 14.1 G/DL (ref 12–15.9)
IMM GRANULOCYTES # BLD AUTO: 0.02 10*3/MM3 (ref 0–0.05)
IMM GRANULOCYTES NFR BLD AUTO: 0.3 % (ref 0–0.5)
LDLC SERPL CALC-MCNC: 155 MG/DL (ref 0–100)
LDLC/HDLC SERPL: 2.93 {RATIO}
LYMPHOCYTES # BLD AUTO: 2.01 10*3/MM3 (ref 0.7–3.1)
LYMPHOCYTES NFR BLD AUTO: 31.4 % (ref 19.6–45.3)
MCH RBC QN AUTO: 29.6 PG (ref 26.6–33)
MCHC RBC AUTO-ENTMCNC: 32.7 G/DL (ref 31.5–35.7)
MCV RBC AUTO: 90.4 FL (ref 79–97)
MONOCYTES # BLD AUTO: 0.39 10*3/MM3 (ref 0.1–0.9)
MONOCYTES NFR BLD AUTO: 6.1 % (ref 5–12)
NEUTROPHILS NFR BLD AUTO: 3.63 10*3/MM3 (ref 1.7–7)
NEUTROPHILS NFR BLD AUTO: 56.6 % (ref 42.7–76)
NRBC BLD AUTO-RTO: 0 /100 WBC (ref 0–0.2)
PLATELET # BLD AUTO: 365 10*3/MM3 (ref 140–450)
PMV BLD AUTO: 9.7 FL (ref 6–12)
POTASSIUM SERPL-SCNC: 3.4 MMOL/L (ref 3.5–5.2)
PROT SERPL-MCNC: 7.1 G/DL (ref 6–8.5)
RBC # BLD AUTO: 4.77 10*6/MM3 (ref 3.77–5.28)
SODIUM SERPL-SCNC: 140 MMOL/L (ref 136–145)
TRIGL SERPL-MCNC: 137 MG/DL (ref 0–150)
TSH SERPL DL<=0.05 MIU/L-ACNC: 0.95 UIU/ML (ref 0.27–4.2)
VIT B12 BLD-MCNC: 285 PG/ML (ref 211–946)
VLDLC SERPL-MCNC: 25 MG/DL (ref 5–40)
WBC NRBC COR # BLD: 6.41 10*3/MM3 (ref 3.4–10.8)

## 2022-05-11 PROCEDURE — 82306 VITAMIN D 25 HYDROXY: CPT | Performed by: INTERNAL MEDICINE

## 2022-05-11 PROCEDURE — 82607 VITAMIN B-12: CPT | Performed by: INTERNAL MEDICINE

## 2022-05-11 PROCEDURE — 99214 OFFICE O/P EST MOD 30 MIN: CPT | Performed by: INTERNAL MEDICINE

## 2022-05-11 PROCEDURE — 85025 COMPLETE CBC W/AUTO DIFF WBC: CPT | Performed by: INTERNAL MEDICINE

## 2022-05-11 PROCEDURE — 80061 LIPID PANEL: CPT | Performed by: INTERNAL MEDICINE

## 2022-05-11 PROCEDURE — 36415 COLL VENOUS BLD VENIPUNCTURE: CPT | Performed by: INTERNAL MEDICINE

## 2022-05-11 PROCEDURE — 84443 ASSAY THYROID STIM HORMONE: CPT | Performed by: INTERNAL MEDICINE

## 2022-05-11 PROCEDURE — 80053 COMPREHEN METABOLIC PANEL: CPT | Performed by: INTERNAL MEDICINE

## 2022-05-11 RX ORDER — CLOTRIMAZOLE AND BETAMETHASONE DIPROPIONATE 10; .64 MG/G; MG/G
1 CREAM TOPICAL 2 TIMES DAILY
Qty: 45 G | Refills: 1 | Status: SHIPPED | OUTPATIENT
Start: 2022-05-11 | End: 2022-11-11 | Stop reason: SDUPTHER

## 2022-05-11 RX ORDER — TRIAMTERENE AND HYDROCHLOROTHIAZIDE 37.5; 25 MG/1; MG/1
0.5 TABLET ORAL DAILY
Qty: 45 TABLET | Refills: 1 | Status: SHIPPED | OUTPATIENT
Start: 2022-05-11 | End: 2022-11-11

## 2022-05-11 RX ORDER — CLOTRIMAZOLE AND BETAMETHASONE DIPROPIONATE 10; .64 MG/G; MG/G
1 CREAM TOPICAL 2 TIMES DAILY
Qty: 45 G | Refills: 1 | Status: SHIPPED | OUTPATIENT
Start: 2022-05-11 | End: 2022-05-11

## 2022-05-11 RX ORDER — AMMONIUM LACTATE 12 G/100G
LOTION TOPICAL AS NEEDED
Qty: 225 G | Refills: 1 | Status: SHIPPED | OUTPATIENT
Start: 2022-05-11 | End: 2022-11-11 | Stop reason: SDUPTHER

## 2022-05-11 RX ORDER — LEVOTHYROXINE SODIUM 0.05 MG/1
50 TABLET ORAL DAILY
Qty: 90 TABLET | Refills: 1 | Status: SHIPPED | OUTPATIENT
Start: 2022-05-11 | End: 2022-11-11 | Stop reason: SDUPTHER

## 2022-05-11 RX ORDER — ERGOCALCIFEROL 1.25 MG/1
50000 CAPSULE ORAL
Qty: 13 CAPSULE | Refills: 0 | Status: SHIPPED | OUTPATIENT
Start: 2022-05-11 | End: 2022-06-15

## 2022-05-11 NOTE — TELEPHONE ENCOUNTER
Jessica Blackwell MA 5/11/2022 9:55 AM EDT      ----- Message -----  From: Oksana Smith  Sent: 5/11/2022 9:48 AM EDT  To: [facility] Clinical Pool  Subject: clotrimazole-betamethasone     Dr Serna,  As we discussed in today's visit here is the name of the cream that I was needing refilled/refills. Please send to [place] pharmacy.   clotrimazole-betamethasone (1%/0.05%)    Thank you,  Oksana Smith

## 2022-05-11 NOTE — PROGRESS NOTES
"Chief Complaint  Follow-up (6 Month), Hypertension, Hypothyroidism, and Vitamin D Deficiency    Subjective          Oksana Smith presents to [facility]  History of Present Illness  HTN:  well controlled today, doing well on medication, denies headache, chest pain, dizziness, vision changes    Due for labs today    Objective   Vital Signs:   /68 (BP Location: Right arm, Patient Position: Sitting, Cuff Size: Adult)   Pulse 79   Temp 96.8 °F (36 °C)   Resp 18   Ht 165.1 cm (65\")   Wt 70.4 kg (155 lb 3.2 oz)   SpO2 97%   BMI 25.83 kg/m²     Physical Exam  Vitals reviewed.   Constitutional:       Appearance: Normal appearance. She is well-developed.   HENT:      Head: Normocephalic and atraumatic.      Mouth/Throat:      Pharynx: No oropharyngeal exudate.   Eyes:      Conjunctiva/sclera: Conjunctivae normal.      Pupils: Pupils are equal, round, and reactive to light.   Cardiovascular:      Rate and Rhythm: Normal rate and regular rhythm.      Heart sounds: No murmur heard.    No friction rub. No gallop.   Pulmonary:      Effort: Pulmonary effort is normal.      Breath sounds: Normal breath sounds. No wheezing or rhonchi.   Skin:     General: Skin is warm and dry.   Neurological:      Mental Status: She is alert and oriented to person, place, and time.   Psychiatric:         Mood and Affect: Affect normal.        Result Review :   The following data was reviewed by: Portia Serna MD on 05/11/2022:  CMP    CMP 9/8/21   Glucose 93   BUN 16   Creatinine 1.18 (A)   eGFR Non African Am 47 (A)   Sodium 139   Potassium 4.1   Chloride 103   Calcium 9.5   Albumin 4.30   Total Bilirubin 0.4   Alkaline Phosphatase 62   AST (SGOT) 17   ALT (SGPT) 11   (A) Abnormal value            CBC w/diff    CBC w/Diff 9/8/21   WBC 6.60   RBC 4.99   Hemoglobin 14.6   Hematocrit 45.8   MCV 91.8   MCH 29.3   MCHC 31.9   RDW 12.8   Platelets 363   Neutrophil Rel % 53.6 "   Immature Granulocyte Rel % 0.3   Lymphocyte Rel % 34.5   Monocyte Rel % 5.9   Eosinophil Rel % 4.2   Basophil Rel % 1.5           Lipid Panel    Lipid Panel 9/8/21   Total Cholesterol 241 (A)   Triglycerides 161 (A)   HDL Cholesterol 48   VLDL Cholesterol 29   LDL Cholesterol  164 (A)   LDL/HDL Ratio 3.35   (A) Abnormal value            TSH    TSH 9/8/21   TSH 3.140              Procedures      Assessment and Plan    Diagnoses and all orders for this visit:    1. Vitamin D deficiency (Primary)  -     Vitamin D 25 Hydroxy  -     vitamin D (ERGOCALCIFEROL) 1.25 MG (09726 UT) capsule capsule; Take 1 capsule by mouth Every 7 (Seven) Days.  Dispense: 13 capsule; Refill: 0    2. Hypothyroidism, unspecified type  Assessment & Plan:  Well controlled on last labs  Rechecking labs today  Continue current management    Orders:  -     Comprehensive Metabolic Panel  -     CBC & Differential  -     TSH  -     Lipid Panel  -     Vitamin D 25 Hydroxy    3. Vitamin B12 deficiency  -     Vitamin B12    4. Primary hypertension  Assessment & Plan:  Well controlled in clinic today  Continue current management    Orders:  -     Comprehensive Metabolic Panel  -     CBC & Differential  -     TSH  -     Lipid Panel  -     Vitamin D 25 Hydroxy    Other orders  -     levothyroxine (Synthroid) 50 MCG tablet; Take 1 tablet by mouth Daily.  Dispense: 90 tablet; Refill: 1  -     triamterene-hydrochlorothiazide (MAXZIDE-25) 37.5-25 MG per tablet; Take 0.5 tablets by mouth Daily.  Dispense: 45 tablet; Refill: 1  -     ammonium lactate (AmLactin) 12 % lotion; Apply  topically to the appropriate area as directed As Needed for Dry Skin.  Dispense: 225 g; Refill: 1            Follow Up   Return in about 6 months (around 11/11/2022) for Next scheduled follow up.  Patient was given instructions and counseling regarding her condition or for health maintenance advice. Please see specific information pulled into the AVS if appropriate.

## 2022-06-13 DIAGNOSIS — E55.9 VITAMIN D DEFICIENCY: ICD-10-CM

## 2022-06-15 RX ORDER — ERGOCALCIFEROL 1.25 MG/1
CAPSULE ORAL
Qty: 12 CAPSULE | Refills: 1 | Status: SHIPPED | OUTPATIENT
Start: 2022-06-15 | End: 2022-11-11 | Stop reason: SDUPTHER

## 2022-07-01 ENCOUNTER — HOSPITAL ENCOUNTER (OUTPATIENT)
Dept: OTHER | Facility: HOSPITAL | Age: 62
Discharge: HOME | End: 2022-07-01

## 2022-10-17 ENCOUNTER — TELEPHONE (OUTPATIENT)
Dept: INTERNAL MEDICINE | Facility: CLINIC | Age: 62
End: 2022-10-17

## 2022-10-17 ENCOUNTER — CLINICAL SUPPORT (OUTPATIENT)
Dept: INTERNAL MEDICINE | Facility: CLINIC | Age: 62
End: 2022-10-17

## 2022-10-17 DIAGNOSIS — R05.9 COUGH, UNSPECIFIED TYPE: ICD-10-CM

## 2022-10-17 DIAGNOSIS — R05.9 COUGH, UNSPECIFIED TYPE: Primary | ICD-10-CM

## 2022-10-17 PROCEDURE — U0004 COV-19 TEST NON-CDC HGH THRU: HCPCS | Performed by: INTERNAL MEDICINE

## 2022-10-17 PROCEDURE — 99211 OFF/OP EST MAY X REQ PHY/QHP: CPT | Performed by: INTERNAL MEDICINE

## 2022-10-17 NOTE — TELEPHONE ENCOUNTER
Patient called in stating she tested positive at the [facility] for covid and is needing medication for covid. I advised that we need a covid test from our office to be preformed and to come back positive before we can send in paxlovid for pts. Pt verbalized understanding and will come get tested.

## 2022-10-18 ENCOUNTER — OFFICE VISIT (OUTPATIENT)
Dept: INTERNAL MEDICINE | Facility: CLINIC | Age: 62
End: 2022-10-18

## 2022-10-18 VITALS
OXYGEN SATURATION: 96 % | HEART RATE: 107 BPM | TEMPERATURE: 99 F | SYSTOLIC BLOOD PRESSURE: 100 MMHG | DIASTOLIC BLOOD PRESSURE: 70 MMHG

## 2022-10-18 DIAGNOSIS — R05.9 COUGH, UNSPECIFIED TYPE: Primary | ICD-10-CM

## 2022-10-18 DIAGNOSIS — U07.1 COVID-19 VIRUS INFECTION: ICD-10-CM

## 2022-10-18 LAB — SARS-COV-2 RNA PNL SPEC NAA+PROBE: DETECTED

## 2022-10-18 PROCEDURE — 99213 OFFICE O/P EST LOW 20 MIN: CPT | Performed by: PHYSICIAN ASSISTANT

## 2022-10-18 NOTE — PROGRESS NOTES
Chief Complaint  follow up  ([facility] clinic covid positive  ) and Cough    Subjective          Oksana Jose Smith presents to [facility]  History of Present Illness  Headache, cough- symptoms started a couple days ago with a scratchy throat.  Patient was seen at [facility] yesterday for her headache and diagnosed with COVID.  Headache has improved some today.  She has taken some Tylenol.  She has had associated fever. Patient requesting paxlovid treatment due to her history of cancer and paralyzed diaphragm. She is requesting spirometer to help with lung function.       Objective   Vital Signs:   /70 (BP Location: Left arm, Patient Position: Sitting)   Pulse 107   Temp 99 °F (37.2 °C) (Temporal)   SpO2 96%     Physical Exam  Vitals reviewed.   Constitutional:       Appearance: Normal appearance. She is well-developed.   HENT:      Head: Normocephalic and atraumatic.      Right Ear: Tympanic membrane, ear canal and external ear normal.      Left Ear: Tympanic membrane, ear canal and external ear normal.      Ears:      Comments: Bilateral mucoid effusions     Mouth/Throat:      Mouth: Mucous membranes are moist.      Pharynx: Oropharynx is clear. No posterior oropharyngeal erythema.   Eyes:      Conjunctiva/sclera: Conjunctivae normal.      Pupils: Pupils are equal, round, and reactive to light.   Cardiovascular:      Rate and Rhythm: Normal rate and regular rhythm.      Heart sounds: No murmur heard.    No friction rub. No gallop.   Pulmonary:      Effort: Pulmonary effort is normal.      Breath sounds: Normal breath sounds. No wheezing or rhonchi.   Musculoskeletal:      Cervical back: Neck supple. No tenderness.   Lymphadenopathy:      Cervical: No cervical adenopathy.   Skin:     General: Skin is warm and dry.   Neurological:      Mental Status: She is alert and oriented to person, place, and time.      Cranial Nerves: No cranial nerve deficit.   Psychiatric:          Mood and Affect: Mood and affect normal.         Behavior: Behavior normal.         Thought Content: Thought content normal.         Judgment: Judgment normal.        Result Review :          Procedures      Assessment and Plan    Diagnoses and all orders for this visit:    1. Cough, unspecified type (Primary)  -     Spirometer Incentive Airlife 1Way Valve 4000ML LF    2. 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 Would expect symptoms to be self limiting within the next few days.  Continue conservative treatment at this time.  Watch closely for new or worsening symptoms, especially if patient develops fevers, difficulty breathing or signs of dehydration.  Call or return if symptoms persist or worsen.       Orders:  -     Spirometer Incentive Airlife 1Way Valve 4000ML LF    Other orders  -     Nirmatrelvir&Ritonavir 300/100 (PAXLOVID) 20 x 150 MG & 10 x 100MG tablet therapy pack tablet; Take 2 tablets by mouth 2 (Two) Times a Day for 5 days.  Dispense: 20 tablet; Refill: 0            Follow Up   Return if symptoms worsen or fail to improve.  Patient was given instructions and counseling regarding her condition or for health maintenance advice. Please see specific information pulled into the AVS if appropriate.

## 2022-10-18 NOTE — ASSESSMENT & PLAN NOTE
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 Would expect symptoms to be self limiting within the next few days.  Continue conservative treatment at this time.  Watch closely for new or worsening symptoms, especially if patient develops fevers, difficulty breathing or signs of dehydration.  Call or return if symptoms persist or worsen.

## 2022-11-11 ENCOUNTER — OFFICE VISIT (OUTPATIENT)
Dept: INTERNAL MEDICINE | Facility: CLINIC | Age: 62
End: 2022-11-11

## 2022-11-11 VITALS
WEIGHT: 164.2 LBS | BODY MASS INDEX: 27.36 KG/M2 | HEART RATE: 76 BPM | OXYGEN SATURATION: 98 % | HEIGHT: 65 IN | TEMPERATURE: 97.4 F | SYSTOLIC BLOOD PRESSURE: 100 MMHG | DIASTOLIC BLOOD PRESSURE: 70 MMHG

## 2022-11-11 DIAGNOSIS — Z11.59 NEED FOR HEPATITIS C SCREENING TEST: ICD-10-CM

## 2022-11-11 DIAGNOSIS — E03.9 HYPOTHYROIDISM, UNSPECIFIED TYPE: Primary | ICD-10-CM

## 2022-11-11 DIAGNOSIS — I10 PRIMARY HYPERTENSION: ICD-10-CM

## 2022-11-11 DIAGNOSIS — Z20.822 CLOSE EXPOSURE TO COVID-19 VIRUS: ICD-10-CM

## 2022-11-11 DIAGNOSIS — E55.9 VITAMIN D DEFICIENCY: ICD-10-CM

## 2022-11-11 PROBLEM — R60.0 EDEMA OF LOWER EXTREMITY: Status: ACTIVE | Noted: 2022-07-18

## 2022-11-11 PROBLEM — Z98.890 S/P LUMPECTOMY, RIGHT BREAST: Status: ACTIVE | Noted: 2022-11-11

## 2022-11-11 PROBLEM — C37 MALIGNANT NEOPLASM OF THYMUS (HCC): Status: ACTIVE | Noted: 2022-11-11

## 2022-11-11 PROBLEM — R79.89 LOW VITAMIN D LEVEL: Status: ACTIVE | Noted: 2022-07-18

## 2022-11-11 PROBLEM — Z90.89 HISTORY OF TONSILLECTOMY: Status: ACTIVE | Noted: 2022-11-11

## 2022-11-11 PROBLEM — M77.10 LATERAL EPICONDYLITIS: Status: ACTIVE | Noted: 2018-03-14

## 2022-11-11 PROBLEM — E53.8 LOW SERUM VITAMIN B12: Status: ACTIVE | Noted: 2022-07-18

## 2022-11-11 PROBLEM — R06.02 SHORTNESS OF BREATH: Status: ACTIVE | Noted: 2022-11-11

## 2022-11-11 PROBLEM — K62.5 RECTAL BLEEDING: Status: ACTIVE | Noted: 2022-11-11

## 2022-11-11 LAB
25(OH)D3 SERPL-MCNC: 38.5 NG/ML (ref 30–100)
ALBUMIN SERPL-MCNC: 4.3 G/DL (ref 3.5–5.2)
ALBUMIN/GLOB SERPL: 1.5 G/DL
ALP SERPL-CCNC: 67 U/L (ref 39–117)
ALT SERPL W P-5'-P-CCNC: 21 U/L (ref 1–33)
ANION GAP SERPL CALCULATED.3IONS-SCNC: 10 MMOL/L (ref 5–15)
AST SERPL-CCNC: 19 U/L (ref 1–32)
BASOPHILS # BLD AUTO: 0.1 10*3/MM3 (ref 0–0.2)
BASOPHILS NFR BLD AUTO: 1.5 % (ref 0–1.5)
BILIRUB SERPL-MCNC: 0.6 MG/DL (ref 0–1.2)
BUN SERPL-MCNC: 13 MG/DL (ref 6–20)
BUN/CREAT SERPL: 13.1 (ref 7–25)
CALCIUM SPEC-SCNC: 9.4 MG/DL (ref 8.6–10.5)
CHLORIDE SERPL-SCNC: 101 MMOL/L (ref 98–107)
CHOLEST SERPL-MCNC: 257 MG/DL (ref 0–200)
CO2 SERPL-SCNC: 28 MMOL/L (ref 22–29)
CREAT SERPL-MCNC: 0.99 MG/DL (ref 0.57–1)
DEPRECATED RDW RBC AUTO: 40.9 FL (ref 37–54)
EGFRCR SERPLBLD CKD-EPI 2021: 65.8 ML/MIN/1.73
EOSINOPHIL # BLD AUTO: 0.22 10*3/MM3 (ref 0–0.4)
EOSINOPHIL NFR BLD AUTO: 3.3 % (ref 0.3–6.2)
ERYTHROCYTE [DISTWIDTH] IN BLOOD BY AUTOMATED COUNT: 12.6 % (ref 12.3–15.4)
GLOBULIN UR ELPH-MCNC: 2.8 GM/DL
GLUCOSE SERPL-MCNC: 88 MG/DL (ref 65–99)
HCT VFR BLD AUTO: 42.4 % (ref 34–46.6)
HCV AB SER DONR QL: NORMAL
HDLC SERPL-MCNC: 51 MG/DL (ref 40–60)
HGB BLD-MCNC: 14 G/DL (ref 12–15.9)
IMM GRANULOCYTES # BLD AUTO: 0.02 10*3/MM3 (ref 0–0.05)
IMM GRANULOCYTES NFR BLD AUTO: 0.3 % (ref 0–0.5)
LDLC SERPL CALC-MCNC: 179 MG/DL (ref 0–100)
LDLC/HDLC SERPL: 3.46 {RATIO}
LYMPHOCYTES # BLD AUTO: 1.78 10*3/MM3 (ref 0.7–3.1)
LYMPHOCYTES NFR BLD AUTO: 26.8 % (ref 19.6–45.3)
MCH RBC QN AUTO: 29.6 PG (ref 26.6–33)
MCHC RBC AUTO-ENTMCNC: 33 G/DL (ref 31.5–35.7)
MCV RBC AUTO: 89.6 FL (ref 79–97)
MONOCYTES # BLD AUTO: 0.39 10*3/MM3 (ref 0.1–0.9)
MONOCYTES NFR BLD AUTO: 5.9 % (ref 5–12)
NEUTROPHILS NFR BLD AUTO: 4.14 10*3/MM3 (ref 1.7–7)
NEUTROPHILS NFR BLD AUTO: 62.2 % (ref 42.7–76)
NRBC BLD AUTO-RTO: 0 /100 WBC (ref 0–0.2)
PLATELET # BLD AUTO: 389 10*3/MM3 (ref 140–450)
PMV BLD AUTO: 9.8 FL (ref 6–12)
POTASSIUM SERPL-SCNC: 3.7 MMOL/L (ref 3.5–5.2)
PROT SERPL-MCNC: 7.1 G/DL (ref 6–8.5)
RBC # BLD AUTO: 4.73 10*6/MM3 (ref 3.77–5.28)
SODIUM SERPL-SCNC: 139 MMOL/L (ref 136–145)
TRIGL SERPL-MCNC: 148 MG/DL (ref 0–150)
TSH SERPL DL<=0.05 MIU/L-ACNC: 1.73 UIU/ML (ref 0.27–4.2)
VLDLC SERPL-MCNC: 27 MG/DL (ref 5–40)
WBC NRBC COR # BLD: 6.65 10*3/MM3 (ref 3.4–10.8)

## 2022-11-11 PROCEDURE — 80053 COMPREHEN METABOLIC PANEL: CPT | Performed by: INTERNAL MEDICINE

## 2022-11-11 PROCEDURE — U0005 INFEC AGEN DETEC AMPLI PROBE: HCPCS | Performed by: INTERNAL MEDICINE

## 2022-11-11 PROCEDURE — 85025 COMPLETE CBC W/AUTO DIFF WBC: CPT | Performed by: INTERNAL MEDICINE

## 2022-11-11 PROCEDURE — 86803 HEPATITIS C AB TEST: CPT | Performed by: INTERNAL MEDICINE

## 2022-11-11 PROCEDURE — 36415 COLL VENOUS BLD VENIPUNCTURE: CPT | Performed by: INTERNAL MEDICINE

## 2022-11-11 PROCEDURE — 80061 LIPID PANEL: CPT | Performed by: INTERNAL MEDICINE

## 2022-11-11 PROCEDURE — 82306 VITAMIN D 25 HYDROXY: CPT | Performed by: INTERNAL MEDICINE

## 2022-11-11 PROCEDURE — U0004 COV-19 TEST NON-CDC HGH THRU: HCPCS | Performed by: INTERNAL MEDICINE

## 2022-11-11 PROCEDURE — 99214 OFFICE O/P EST MOD 30 MIN: CPT | Performed by: INTERNAL MEDICINE

## 2022-11-11 PROCEDURE — 84443 ASSAY THYROID STIM HORMONE: CPT | Performed by: INTERNAL MEDICINE

## 2022-11-11 RX ORDER — CLOTRIMAZOLE AND BETAMETHASONE DIPROPIONATE 10; .64 MG/G; MG/G
1 CREAM TOPICAL 2 TIMES DAILY
Qty: 45 G | Refills: 1 | Status: SHIPPED | OUTPATIENT
Start: 2022-11-11

## 2022-11-11 RX ORDER — LEVOTHYROXINE SODIUM 0.05 MG/1
50 TABLET ORAL DAILY
Qty: 90 TABLET | Refills: 1 | Status: SHIPPED | OUTPATIENT
Start: 2022-11-11

## 2022-11-11 RX ORDER — TRIAMTERENE AND HYDROCHLOROTHIAZIDE 37.5; 25 MG/1; MG/1
0.5 TABLET ORAL DAILY
Qty: 45 TABLET | Refills: 1 | Status: SHIPPED | OUTPATIENT
Start: 2022-11-11

## 2022-11-11 RX ORDER — TRIAMTERENE AND HYDROCHLOROTHIAZIDE 37.5; 25 MG/1; MG/1
0.5 TABLET ORAL DAILY
Qty: 45 TABLET | Refills: 1 | Status: CANCELLED | OUTPATIENT
Start: 2022-11-11

## 2022-11-11 RX ORDER — AMMONIUM LACTATE 12 G/100G
LOTION TOPICAL AS NEEDED
Qty: 225 G | Refills: 1 | Status: SHIPPED | OUTPATIENT
Start: 2022-11-11

## 2022-11-11 RX ORDER — ERGOCALCIFEROL 1.25 MG/1
50000 CAPSULE ORAL
Qty: 12 CAPSULE | Refills: 1 | Status: SHIPPED | OUTPATIENT
Start: 2022-11-11

## 2022-11-11 RX ORDER — NEOMYCIN SULFATE, POLYMYXIN B SULFATE AND HYDROCORTISONE 10; 3.5; 1 MG/ML; MG/ML; [USP'U]/ML
SUSPENSION/ DROPS AURICULAR (OTIC)
Status: CANCELLED | OUTPATIENT
Start: 2022-11-11

## 2022-11-11 RX ORDER — ERGOCALCIFEROL 1.25 MG/1
50000 CAPSULE ORAL
Qty: 12 CAPSULE | Refills: 1 | Status: CANCELLED | OUTPATIENT
Start: 2022-11-11

## 2022-11-11 NOTE — PROGRESS NOTES
"Chief Complaint  Hypertension    Subjective       Oksana Smith presents to [facility]    HPI   Presenting for follow up of HTN and refill of medications  States that she has been dizzy with standing sometimes. BP low in clinic today.     Objective     Vitals:    11/11/22 0959   BP: 100/70   BP Location: Left arm   Patient Position: Sitting   Cuff Size: Adult   Pulse: 76   Temp: 97.4 °F (36.3 °C)   TempSrc: Temporal   SpO2: 98%   Weight: 74.5 kg (164 lb 3.2 oz)   Height: 165.1 cm (65\")      Wt Readings from Last 3 Encounters:   11/11/22 74.5 kg (164 lb 3.2 oz)   05/11/22 70.4 kg (155 lb 3.2 oz)   10/07/21 77.8 kg (171 lb 9.6 oz)      BP Readings from Last 3 Encounters:   11/11/22 100/70   10/18/22 100/70   05/11/22 122/68        Body mass index is 27.32 kg/m².           Physical Exam  Vitals reviewed.   Constitutional:       Appearance: Normal appearance. She is well-developed.   HENT:      Head: Normocephalic and atraumatic.      Mouth/Throat:      Pharynx: No oropharyngeal exudate.   Eyes:      Conjunctiva/sclera: Conjunctivae normal.      Pupils: Pupils are equal, round, and reactive to light.   Cardiovascular:      Rate and Rhythm: Normal rate and regular rhythm.      Heart sounds: No murmur heard.    No friction rub. No gallop.   Pulmonary:      Effort: Pulmonary effort is normal.      Breath sounds: Normal breath sounds. No wheezing or rhonchi.   Skin:     General: Skin is warm and dry.   Neurological:      Mental Status: She is alert and oriented to person, place, and time.   Psychiatric:         Mood and Affect: Affect normal.          Result Review :   The following data was reviewed by: Portia Serna MD on 11/11/2022:  CMP    CMP 5/11/22   Glucose 85   BUN 14   Creatinine 1.18 (A)   Sodium 140   Potassium 3.4 (A)   Chloride 102   Calcium 10.0   Albumin 3.90   Total Bilirubin 0.6   Alkaline Phosphatase 56   AST (SGOT) 17   ALT (SGPT) 11   (A) " Abnormal value            CBC    CBC 5/11/22   WBC 6.41   RBC 4.77   Hemoglobin 14.1   Hematocrit 43.1   MCV 90.4   MCH 29.6   MCHC 32.7   RDW 12.8   Platelets 365           Lipid Panel    Lipid Panel 5/11/22   Total Cholesterol 232 (A)   Triglycerides 137   HDL Cholesterol 52   VLDL Cholesterol 25   LDL Cholesterol  155 (A)   LDL/HDL Ratio 2.93   (A) Abnormal value            TSH    TSH 5/11/22   TSH 0.946               Procedures    Assessment and Plan   Diagnoses and all orders for this visit:    1. Hypothyroidism, unspecified type (Primary)  -     TSH    2. Vitamin D deficiency  -     vitamin D (ERGOCALCIFEROL) 1.25 MG (10618 UT) capsule capsule; Take 1 capsule by mouth Every 7 (Seven) Days.  Dispense: 12 capsule; Refill: 1  -     Vitamin D,25-Hydroxy    3. Primary hypertension  -     Comprehensive Metabolic Panel  -     CBC & Differential  -     Lipid Panel    4. Close exposure to COVID-19 virus  -     COVID-19,APTIMA PANTHER(JELANI),[facility], NP/OP SWAB IN UTM/VTM/SALINE TRANSPORT MEDIA,24 HR TAT - Swab, Nasopharynx; Future  -     COVID-19,APTIMA PANTHER(JELANI),[facility], NP/OP SWAB IN UTM/VTM/SALINE TRANSPORT MEDIA,24 HR TAT - Swab, Nasopharynx    5. Need for hepatitis C screening test  -     Hepatitis C Antibody    Other orders  -     ammonium lactate (AmLactin) 12 % lotion; Apply  topically to the appropriate area as directed As Needed for Dry Skin.  Dispense: 225 g; Refill: 1  -     clotrimazole-betamethasone (Lotrisone) 1-0.05 % cream; Apply 1 application topically to the appropriate area as directed 2 (Two) Times a Day.  Dispense: 45 g; Refill: 1  -     levothyroxine (Synthroid) 50 MCG tablet; Take 1 tablet by mouth Daily.  Dispense: 90 tablet; Refill: 1  -     triamterene-hydrochlorothiazide (MAXZIDE-25) 37.5-25 MG per tablet; Take 0.5 tablets by mouth Daily.  Dispense: 45 tablet; Refill: 1          Follow Up   Return in about 6 months (around 5/11/2023) for Next scheduled follow up.  Patient was  given instructions and counseling regarding her condition or for health maintenance advice. Please see specific information pulled into the AVS if appropriate.

## 2022-11-12 LAB — SARS-COV-2 RNA PNL SPEC NAA+PROBE: NOT DETECTED

## 2022-11-15 ENCOUNTER — OFFICE VISIT (OUTPATIENT)
Dept: INTERNAL MEDICINE | Facility: CLINIC | Age: 62
End: 2022-11-15

## 2022-11-15 VITALS
BODY MASS INDEX: 27.46 KG/M2 | TEMPERATURE: 98 F | SYSTOLIC BLOOD PRESSURE: 110 MMHG | OXYGEN SATURATION: 98 % | WEIGHT: 165 LBS | DIASTOLIC BLOOD PRESSURE: 60 MMHG | HEART RATE: 86 BPM

## 2022-11-15 DIAGNOSIS — H65.23 BILATERAL CHRONIC SEROUS OTITIS MEDIA: Primary | ICD-10-CM

## 2022-11-15 PROCEDURE — 99213 OFFICE O/P EST LOW 20 MIN: CPT | Performed by: PHYSICIAN ASSISTANT

## 2022-11-15 RX ORDER — AMOXICILLIN AND CLAVULANATE POTASSIUM 875; 125 MG/1; MG/1
1 TABLET, FILM COATED ORAL 2 TIMES DAILY
Qty: 20 TABLET | Refills: 0 | Status: SHIPPED | OUTPATIENT
Start: 2022-11-15 | End: 2022-11-15 | Stop reason: SDUPTHER

## 2022-11-15 RX ORDER — AMOXICILLIN 875 MG/1
875 TABLET, COATED ORAL 2 TIMES DAILY
Qty: 20 TABLET | Refills: 0 | Status: SHIPPED | OUTPATIENT
Start: 2022-11-15 | End: 2022-11-25

## 2022-11-15 NOTE — PROGRESS NOTES
Chief Complaint  Earache    Subjective       Oksana Smith presents to [facility]    HPI   Right ear ache- started over a week ago[facility], was [facility].  Used ear drops Thursday-Sunday.The pain is getting worse. Patient notes she usually gets right ear infections and it  resolves with amoxicillin. Patient usually gets right ear infections 2-3 times a year, has not seen ENT in the pass.  No fever, no right ear drainage, dizziness, syncope, nausea, vomiting, nor changes in vision. Not on any allergy medications.       Objective     Vitals:    11/15/22 0823   BP: 110/60   BP Location: Left arm   Pulse: 86   Temp: 98 °F (36.7 °C)   TempSrc: Temporal   SpO2: 98%   Weight: 74.8 kg (165 lb)           Physical Exam  Constitutional:       Appearance: Normal appearance.   HENT:      Head: Normocephalic and atraumatic.      Right Ear: Ear canal normal.      Left Ear: Tympanic membrane and ear canal normal.      Ears:      Comments: Bilateral effusions, right tragal tenderness.   Eyes:      Conjunctiva/sclera: Conjunctivae normal.   Cardiovascular:      Rate and Rhythm: Normal rate and regular rhythm.      Pulses: Normal pulses.      Heart sounds: Normal heart sounds.   Pulmonary:      Effort: Pulmonary effort is normal.      Breath sounds: Normal breath sounds.   Musculoskeletal:      Cervical back: No tenderness.   Lymphadenopathy:      Cervical: No cervical adenopathy.   Skin:     General: Skin is warm and dry.   Neurological:      Mental Status: She is alert and oriented to person, place, and time.   Psychiatric:         Mood and Affect: Mood normal.         Thought Content: Thought content normal.         Judgment: Judgment normal.            Procedures    Assessment and Plan   Diagnoses and all orders for this visit:    1. Bilateral chronic serous otitis media (Primary)  Assessment & Plan:  -Due to worsening right ear pain and history of AOM, will  treat with amoxicillin.   -Advised patient to start Flonase due to effusions of TM seen on physical exam   -Will refer to ENT do to history of re-current AOM, although patients symptoms would likely greatly improve with prolonged flonase use  -If symptoms fail to improve or get worse please follow up in the clinic     Orders:  -     Ambulatory Referral to ENT (Otolaryngology)    Other orders  -     Discontinue: amoxicillin-clavulanate (Augmentin) 875-125 MG per tablet; Take 1 tablet by mouth 2 (Two) Times a Day for 10 days.  Dispense: 20 tablet; Refill: 0  -     amoxicillin (AMOXIL) 875 MG tablet; Take 1 tablet by mouth 2 (Two) Times a Day for 10 days.  Dispense: 20 tablet; Refill: 0        Follow Up   No follow-ups on file.  Patient was given instructions and counseling regarding her condition or for health maintenance advice. Please see specific information pulled into the AVS if appropriate.

## 2023-02-28 ENCOUNTER — OFFICE VISIT (OUTPATIENT)
Dept: OTOLARYNGOLOGY | Facility: CLINIC | Age: 63
End: 2023-02-28
Payer: MEDICARE

## 2023-02-28 VITALS — WEIGHT: 168.2 LBS | BODY MASS INDEX: 28.02 KG/M2 | TEMPERATURE: 97.5 F | HEIGHT: 65 IN

## 2023-02-28 DIAGNOSIS — H92.03 OTALGIA OF BOTH EARS: ICD-10-CM

## 2023-02-28 DIAGNOSIS — H66.004 RECURRENT ACUTE SUPPURATIVE OTITIS MEDIA OF RIGHT EAR WITHOUT SPONTANEOUS RUPTURE OF TYMPANIC MEMBRANE: Primary | ICD-10-CM

## 2023-02-28 PROCEDURE — 99203 OFFICE O/P NEW LOW 30 MIN: CPT | Performed by: OTOLARYNGOLOGY

## 2023-02-28 RX ORDER — SODIUM FLUORIDE 5 MG/ML
PASTE, DENTIFRICE DENTAL
COMMUNITY
Start: 2022-12-01

## 2023-02-28 NOTE — PROGRESS NOTES
Patient Name: Oksana Smith   Visit Date: 02/28/2023   Patient ID: [id number]  Provider: Ho Guillory MD    Sex: female  Location: [facility]   YOB: 1960  Location Address: [address] [place] [address], [address] [place] [address],?[place]?[id number]    Primary Care Provider Portia Serna MD  Location Phone: [phone]    Referring Provider: Mariel Ramirez PA-C        Chief Complaint  Otitis Media (New patient )    Subjective    History of Present Illness  Oksana Smith is a 62 y.o. female who presents to [facility] today as a consult from Mariel Ramirez PA-C.    She presents to the clinic today for evaluation of recurrent episodes of otalgia that are happening once or twice per year.  Denies any major issues with eustachian tube dysfunction and recurrent otitis as a child.  She denies any pain with chewing or eating.  Notes some muffled hearing at times, but denies any major hearing loss in general.  She has not had drainage with any of these episodes.  The primary care appointment from November relates to recurrent acute otitis media.  The patient was treated with eardrops antibiotics and is not having any symptoms today whatsoever.  She was started on Flonase.    Past Medical History:   Diagnosis Date   • Atypical ductal hyperplasia of right breast    • Breast lump    • Cancer (HCC)    • Chronic cough    • Constipation    • Early satiety    • Esophageal dysphagia    • Fatigue    • Hemidiaphragm paralysis    • HTN (hypertension)    • Hypothyroidism    • Lateral epicondylitis of left elbow 03/14/2018   • OM (otitis media)    • Rectal bleeding    • Right knee pain 08/30/2018   • Shortness of breath    • Tear of PCL (posterior cruciate ligament) of knee, right, initial encounter 08/30/2018   • Thymoma, malignant (HCC)    • Thyroid disorder    • Vitamin B12 deficiency    • Vitamin D deficiency        Past Surgical History:   Procedure  "Laterality Date   • BREAST LUMPECTOMY Right    • COLONOSCOPY      Dr. Roque   • ENDOSCOPY  09/11/2018    Dr. Roque   • HYSTERECTOMY  1994   • TONSILLECTOMY  1967         Current Outpatient Medications:   •  levothyroxine (Synthroid) 50 MCG tablet, Take 1 tablet by mouth Daily., Disp: 90 tablet, Rfl: 1  •  triamterene-hydrochlorothiazide (MAXZIDE-25) 37.5-25 MG per tablet, Take 0.5 tablets by mouth Daily., Disp: 45 tablet, Rfl: 1  •  vitamin D (ERGOCALCIFEROL) 1.25 MG (67044 UT) capsule capsule, Take 1 capsule by mouth Every 7 (Seven) Days., Disp: 12 capsule, Rfl: 1  •  ammonium lactate (AmLactin) 12 % lotion, Apply  topically to the appropriate area as directed As Needed for Dry Skin., Disp: 225 g, Rfl: 1  •  clotrimazole-betamethasone (Lotrisone) 1-0.05 % cream, Apply 1 application topically to the appropriate area as directed 2 (Two) Times a Day., Disp: 45 g, Rfl: 1  •  neomycin-polymyxin-hydrocortisone (CORTISPORIN) 3.5-10397-1 otic suspension, INSTILL 3 DROPS IN AFFECTED EAR(S) FOUR TIMES DAILY X7 DAYS, Disp: , Rfl:   •  PreviDent 5000 Plus 1.1 % cream, , Disp: , Rfl:      Allergies   Allergen Reactions   • Clindamycin Hives, Itching and Nausea And Vomiting   • Erythromycin Hives, Itching, GI Intolerance and Unknown - High Severity       Family History   Problem Relation Age of Onset   • Breast cancer Mother    • Heart disease Mother    • Cancer Mother    • Arthritis Mother    • Heart disease Father    • Arthritis Father    • Diabetes Sister         Social History     Social History Narrative   • Not on file       Objective     Vital Signs:   Temp 97.5 °F (36.4 °C) (Tympanic)   Ht 165.1 cm (65\")   Wt 76.3 kg (168 lb 3.2 oz)   BMI 27.99 kg/m²       Physical Exam         Constitutional   Appearance  · : well developed, well-nourished, alert and in no acute distress, voice clear and strong    Head  Inspection  · : no deformities or lesions  Face  Inspection  · : No facial lesions; House-Brackmann I/VI " bilaterally  Palpation  · : No TMJ crepitus nor  muscle tenderness bilaterally    Eyes  Vision  Visual Fields  · : Extraocular movements are intact. No spontaneous or gaze-induced nystagmus.  Conjunctivae  · : clear  Sclerae  · : clear  Pupils and Irises  · : pupils equal, round, and reactive to light.     Ears, Nose, Mouth and Throat    Ears    External Ears  · : appearance within normal limits, no lesions present  Otoscopic Examination  · : Tympanic membrane appearance within normal limits bilaterally without perforations, well-aerated middle ears  Hearing  · : intact to conversational voice both ears  Tunning fork testing:     :    Nose    External Nose  · : appearance normal  Intranasal Exam  · : mucosa within normal limits, vestibules normal, no intranasal lesions present, septum midline, sinuses non tender to percussion  Oral Cavity    Oral Mucosa  · : oral mucosa normal without pallor or cyanosis  Lips  · : lip appearance normal  Teeth  · : normal dentition for age  Gums  · : gums pink, non-swollen, no bleeding present  Tongue  · : tongue appearance normal; normal mobility  Palate  · : hard palate normal, soft palate appearance normal with symmetric mobility    Throat    Oropharynx  · : no inflammation or lesions present, tonsils within normal limits  Hypopharynx  · : appearance within normal limits, superior epiglottis within normal limits  Larynx  · : appearance within normal limits, vocal cords within normal limits, no lesions present    Neck  Inspection/Palpation  · : normal appearance, no masses or tenderness, trachea midline; thyroid size normal, nontender, no nodules or masses present on palpation    Respiratory  Respiratory Effort  · : breathing unlabored  Inspection of Chest  · : normal appearance, no retractions    Cardiovascular  Heart  · : regular rate and rhythm    Lymphatic  Neck  · : no lymphadenopathy present  Supraclavicular Nodes  · : no lymphadenopathy present  Preauricular  Nodes  · : no lymphadenopathy present    Skin and Subcutaneous Tissue  General Inspection  · : Regarding face and neck - there are no rashes present, no lesions present, and no areas of discoloration    Neurologic  Cranial Nerves  · : cranial nerves II-XII are grossly intact bilaterally  Gait and Station  · : normal gait, able to stand without diffculty    Psychiatric  Judgement and Insight  · : judgment and insight intact  Mood and Affect  · : mood normal, affect appropriate          Assessment and Plan    Diagnoses and all orders for this visit:    1. Recurrent acute suppurative otitis media of right ear without spontaneous rupture of tympanic membrane (Primary)    2. Otalgia of both ears    Examination today was completely normal, and I did not see any evidence of TMJ, otitis externa, or otitis media.  I discussed these conditions with her and would like to see her back in the clinic next time she is having ear pain to better delineate exactly what is going on.  Perhaps the Flonase is helping if she was having eustachian tube dysfunction and recurrent otitis media.     Follow Up   No follow-ups on file.  Patient was given instructions and counseling regarding her condition or for health maintenance advice. Please see specific information pulled into the AVS if appropriate.

## 2023-03-07 ENCOUNTER — IMMUNIZATION (OUTPATIENT)
Dept: INTERNAL MEDICINE | Facility: CLINIC | Age: 63
End: 2023-03-07
Payer: MEDICARE

## 2023-03-07 PROCEDURE — 0124A COVID-19 (PFIZER) BIVALENT BOOSTER 12+YRS: CPT | Performed by: INTERNAL MEDICINE

## 2023-03-07 PROCEDURE — 91312 COVID-19 (PFIZER) BIVALENT BOOSTER 12+YRS: CPT | Performed by: INTERNAL MEDICINE

## 2023-04-24 DIAGNOSIS — E55.9 VITAMIN D DEFICIENCY: ICD-10-CM

## 2023-04-24 RX ORDER — ERGOCALCIFEROL 1.25 MG/1
CAPSULE ORAL
Qty: 12 CAPSULE | Refills: 1 | Status: SHIPPED | OUTPATIENT
Start: 2023-04-24

## 2023-05-15 ENCOUNTER — OFFICE VISIT (OUTPATIENT)
Dept: INTERNAL MEDICINE | Facility: CLINIC | Age: 63
End: 2023-05-15
Payer: MEDICARE

## 2023-05-15 VITALS
BODY MASS INDEX: 28.16 KG/M2 | TEMPERATURE: 97.8 F | SYSTOLIC BLOOD PRESSURE: 120 MMHG | HEIGHT: 65 IN | OXYGEN SATURATION: 98 % | DIASTOLIC BLOOD PRESSURE: 74 MMHG | WEIGHT: 169 LBS | RESPIRATION RATE: 18 BRPM | HEART RATE: 80 BPM

## 2023-05-15 DIAGNOSIS — I10 PRIMARY HYPERTENSION: ICD-10-CM

## 2023-05-15 DIAGNOSIS — E03.9 HYPOTHYROIDISM, UNSPECIFIED TYPE: ICD-10-CM

## 2023-05-15 DIAGNOSIS — Z00.00 ENCOUNTER FOR SUBSEQUENT ANNUAL WELLNESS VISIT (AWV) IN MEDICARE PATIENT: Primary | ICD-10-CM

## 2023-05-15 DIAGNOSIS — E55.9 VITAMIN D DEFICIENCY: ICD-10-CM

## 2023-05-15 LAB
25(OH)D3 SERPL-MCNC: 40.6 NG/ML (ref 30–100)
ALBUMIN SERPL-MCNC: 4.1 G/DL (ref 3.5–5.2)
ALBUMIN/GLOB SERPL: 1.4 G/DL
ALP SERPL-CCNC: 71 U/L (ref 39–117)
ALT SERPL W P-5'-P-CCNC: 14 U/L (ref 1–33)
ANION GAP SERPL CALCULATED.3IONS-SCNC: 10.1 MMOL/L (ref 5–15)
AST SERPL-CCNC: 15 U/L (ref 1–32)
BASOPHILS # BLD AUTO: 0.09 10*3/MM3 (ref 0–0.2)
BASOPHILS NFR BLD AUTO: 1.4 % (ref 0–1.5)
BILIRUB SERPL-MCNC: 0.6 MG/DL (ref 0–1.2)
BUN SERPL-MCNC: 20 MG/DL (ref 8–23)
BUN/CREAT SERPL: 20.6 (ref 7–25)
CALCIUM SPEC-SCNC: 9.6 MG/DL (ref 8.6–10.5)
CHLORIDE SERPL-SCNC: 101 MMOL/L (ref 98–107)
CHOLEST SERPL-MCNC: 243 MG/DL (ref 0–200)
CO2 SERPL-SCNC: 28.9 MMOL/L (ref 22–29)
CREAT SERPL-MCNC: 0.97 MG/DL (ref 0.57–1)
DEPRECATED RDW RBC AUTO: 41.4 FL (ref 37–54)
EGFRCR SERPLBLD CKD-EPI 2021: 67 ML/MIN/1.73
EOSINOPHIL # BLD AUTO: 0.3 10*3/MM3 (ref 0–0.4)
EOSINOPHIL NFR BLD AUTO: 4.7 % (ref 0.3–6.2)
ERYTHROCYTE [DISTWIDTH] IN BLOOD BY AUTOMATED COUNT: 12.7 % (ref 12.3–15.4)
GLOBULIN UR ELPH-MCNC: 3 GM/DL
GLUCOSE SERPL-MCNC: 88 MG/DL (ref 65–99)
HCT VFR BLD AUTO: 44.3 % (ref 34–46.6)
HDLC SERPL-MCNC: 51 MG/DL (ref 40–60)
HGB BLD-MCNC: 14.9 G/DL (ref 12–15.9)
IMM GRANULOCYTES # BLD AUTO: 0.02 10*3/MM3 (ref 0–0.05)
IMM GRANULOCYTES NFR BLD AUTO: 0.3 % (ref 0–0.5)
LDLC SERPL CALC-MCNC: 166 MG/DL (ref 0–100)
LDLC/HDLC SERPL: 3.2 {RATIO}
LYMPHOCYTES # BLD AUTO: 1.87 10*3/MM3 (ref 0.7–3.1)
LYMPHOCYTES NFR BLD AUTO: 29.4 % (ref 19.6–45.3)
MCH RBC QN AUTO: 30 PG (ref 26.6–33)
MCHC RBC AUTO-ENTMCNC: 33.6 G/DL (ref 31.5–35.7)
MCV RBC AUTO: 89.1 FL (ref 79–97)
MONOCYTES # BLD AUTO: 0.38 10*3/MM3 (ref 0.1–0.9)
MONOCYTES NFR BLD AUTO: 6 % (ref 5–12)
NEUTROPHILS NFR BLD AUTO: 3.7 10*3/MM3 (ref 1.7–7)
NEUTROPHILS NFR BLD AUTO: 58.2 % (ref 42.7–76)
NRBC BLD AUTO-RTO: 0 /100 WBC (ref 0–0.2)
PLATELET # BLD AUTO: 385 10*3/MM3 (ref 140–450)
PMV BLD AUTO: 9.8 FL (ref 6–12)
POTASSIUM SERPL-SCNC: 3.6 MMOL/L (ref 3.5–5.2)
PROT SERPL-MCNC: 7.1 G/DL (ref 6–8.5)
RBC # BLD AUTO: 4.97 10*6/MM3 (ref 3.77–5.28)
SODIUM SERPL-SCNC: 140 MMOL/L (ref 136–145)
TRIGL SERPL-MCNC: 143 MG/DL (ref 0–150)
TSH SERPL DL<=0.05 MIU/L-ACNC: 1.6 UIU/ML (ref 0.27–4.2)
VLDLC SERPL-MCNC: 26 MG/DL (ref 5–40)
WBC NRBC COR # BLD: 6.36 10*3/MM3 (ref 3.4–10.8)

## 2023-05-15 PROCEDURE — 85025 COMPLETE CBC W/AUTO DIFF WBC: CPT | Performed by: INTERNAL MEDICINE

## 2023-05-15 PROCEDURE — 80053 COMPREHEN METABOLIC PANEL: CPT | Performed by: INTERNAL MEDICINE

## 2023-05-15 PROCEDURE — 3078F DIAST BP <80 MM HG: CPT | Performed by: INTERNAL MEDICINE

## 2023-05-15 PROCEDURE — 80061 LIPID PANEL: CPT | Performed by: INTERNAL MEDICINE

## 2023-05-15 PROCEDURE — 3074F SYST BP LT 130 MM HG: CPT | Performed by: INTERNAL MEDICINE

## 2023-05-15 PROCEDURE — 1160F RVW MEDS BY RX/DR IN RCRD: CPT | Performed by: INTERNAL MEDICINE

## 2023-05-15 PROCEDURE — G0439 PPPS, SUBSEQ VISIT: HCPCS | Performed by: INTERNAL MEDICINE

## 2023-05-15 PROCEDURE — 82306 VITAMIN D 25 HYDROXY: CPT | Performed by: INTERNAL MEDICINE

## 2023-05-15 PROCEDURE — 1159F MED LIST DOCD IN RCRD: CPT | Performed by: INTERNAL MEDICINE

## 2023-05-15 PROCEDURE — 1170F FXNL STATUS ASSESSED: CPT | Performed by: INTERNAL MEDICINE

## 2023-05-15 PROCEDURE — 84443 ASSAY THYROID STIM HORMONE: CPT | Performed by: INTERNAL MEDICINE

## 2023-05-15 RX ORDER — CLOTRIMAZOLE AND BETAMETHASONE DIPROPIONATE 10; .64 MG/G; MG/G
1 CREAM TOPICAL 2 TIMES DAILY
Qty: 45 G | Refills: 1 | Status: SHIPPED | OUTPATIENT
Start: 2023-05-15

## 2023-05-15 RX ORDER — SODIUM FLUORIDE 5 MG/ML
PASTE, DENTIFRICE DENTAL
Status: CANCELLED | OUTPATIENT
Start: 2023-05-15

## 2023-05-15 RX ORDER — ERGOCALCIFEROL 1.25 MG/1
50000 CAPSULE ORAL
Qty: 12 CAPSULE | Refills: 3 | Status: SHIPPED | OUTPATIENT
Start: 2023-05-15

## 2023-05-15 RX ORDER — LEVOTHYROXINE SODIUM 0.05 MG/1
50 TABLET ORAL DAILY
Qty: 90 TABLET | Refills: 1 | Status: SHIPPED | OUTPATIENT
Start: 2023-05-15

## 2023-05-15 RX ORDER — TRIAMTERENE AND HYDROCHLOROTHIAZIDE 37.5; 25 MG/1; MG/1
0.5 TABLET ORAL DAILY
Qty: 45 TABLET | Refills: 1 | Status: SHIPPED | OUTPATIENT
Start: 2023-05-15

## 2023-05-15 RX ORDER — AMMONIUM LACTATE 12 G/100G
LOTION TOPICAL AS NEEDED
Qty: 225 G | Refills: 1 | Status: SHIPPED | OUTPATIENT
Start: 2023-05-15

## 2023-05-15 NOTE — PATIENT INSTRUCTIONS
Medicare Wellness  Personal Prevention Plan of Service     Date of Office Visit:    Encounter Provider:  Portia Serna MD  Place of Service:  [facility]  Patient Name: Oksana Smith  :  1960    As part of the Medicare Wellness portion of your visit today, we are providing you with this personalized preventive plan of services (PPPS). This plan is based upon recommendations of the [place] Preventive Services Task Force (USPSTF) and the Advisory Committee on Immunization Practices (ACIP).    This lists the preventive care services that should be considered, and provides dates of when you are due. Items listed as completed are up-to-date and do not require any further intervention.    Health Maintenance   Topic Date Due    TDAP/TD VACCINES (1 - Tdap) Never done    ZOSTER VACCINE (1 of 2) Never done    ANNUAL WELLNESS VISIT  Never done    INFLUENZA VACCINE  2023    COLORECTAL CANCER SCREENING  2023    MAMMOGRAM  2025    HEPATITIS C SCREENING  Completed    COVID-19 Vaccine  Completed    Pneumococcal Vaccine 0-64  Aged Out       Orders Placed This Encounter   Procedures    Comprehensive Metabolic Panel     Order Specific Question:   Release to patient     Answer:   Immediate    TSH    Lipid Panel    Vitamin D,25-Hydroxy     Order Specific Question:   Release to patient     Answer:   Immediate    CBC Auto Differential    CBC & Differential     Order Specific Question:   Manual Differential     Answer:   No       Return in about 6 months (around 11/15/2023) for Next scheduled follow up.        Advance Care Planning and Advance Directives     You make decisions on a daily basis - decisions about where you want to live, your career, your home, your life. Perhaps one of the most important decisions you face is your choice for future medical care. Take time to talk with your family and your healthcare team and start planning  today.  Advance Care Planning is a process that can help you:  Understand possible future healthcare decisions in light of your own experiences  Reflect on those decision in light of your goals and values  Discuss your decisions with those closest to you and the healthcare professionals that care for you  Make a plan by creating a document that reflects your wishes    Surrogate Decision Maker  In the event of a medical emergency, which has left you unable to communicate or to make your own decisions, you would need someone to make decisions for you.  It is important to discuss your preferences for medical treatment with this person while you are in good health.     Qualities of a surrogate decision maker:  Willing to take on this role and responsibility  Knows what you want for future medical care  Willing to follow your wishes even if they don't agree with them  Able to make difficult medical decisions under stressful circumstances    Advance Directives  These are legal documents you can create that will guide your healthcare team and decision maker(s) when needed. These documents can be stored in the electronic medical record.    Living Will - a legal document to guide your care if you have a terminal condition or a serious illness and are unable to communicate. States vary by statute in document names/types, but most forms may include one or more of the following:        -  Directions regarding life-prolonging treatments        -  Directions regarding artificially provided nutrition/hydration        -  Choosing a healthcare decision maker        -  Direction regarding organ/tissue donation    Durable Power of  for Healthcare - this document names an -in-fact to make medical decisions for you, but it may also allow this person to make personal and financial decisions for you. Please seek the advice of an  if you need this type of document.    **Advance Directives are not required and no one  may discriminate against you if you do not sign one.    Medical Orders  Many states allow specific forms/orders signed by your physician to record your wishes for medical treatment in your current state of health. This form, signed in personal communication with your physician, addresses resuscitation and other medical interventions that you may or may not want.      For more information or to schedule a time with a [facility] Advance Care Planning Facilitator contact: [facility].[employer]/ACP or call [phone] and someone will contact you directly.

## 2023-05-15 NOTE — PROGRESS NOTES
The ABCs of the Annual Wellness Visit  Subsequent Medicare Wellness Visit    Subjective      Oksana Smith is a 62 y.o. female who presents for a Subsequent Medicare Wellness Visit.    The following portions of the patient's history were reviewed and   updated as appropriate: allergies, current medications, past family history, past medical history, past social history, past surgical history and problem list.    Compared to one year ago, the patient feels her physical   health is the same.    Compared to one year ago, the patient feels her mental   health is the same.    Recent Hospitalizations:  She was not admitted to the hospital during the last year.       Current Medical Providers:  Patient Care Team:  Portia Serna MD as PCP - General (Pediatrics)    Outpatient Medications Prior to Visit   Medication Sig Dispense Refill   • PreviDent 5000 Plus 1.1 % cream      • ammonium lactate (AmLactin) 12 % lotion Apply  topically to the appropriate area as directed As Needed for Dry Skin. 225 g 1   • clotrimazole-betamethasone (Lotrisone) 1-0.05 % cream Apply 1 application topically to the appropriate area as directed 2 (Two) Times a Day. 45 g 1   • levothyroxine (Synthroid) 50 MCG tablet Take 1 tablet by mouth Daily. 90 tablet 1   • triamterene-hydrochlorothiazide (MAXZIDE-25) 37.5-25 MG per tablet Take 0.5 tablets by mouth Daily. 45 tablet 1   • vitamin D (ERGOCALCIFEROL) 1.25 MG (49855 UT) capsule capsule TAKE 1 CAPSULE BY MOUTH EVERY 7 DAYS 12 capsule 1   • neomycin-polymyxin-hydrocortisone (CORTISPORIN) 3.5-52616-1 otic suspension INSTILL 3 DROPS IN AFFECTED EAR(S) FOUR TIMES DAILY X7 DAYS (Patient not taking: Reported on 5/15/2023)       No facility-administered medications prior to visit.       No opioid medication identified on active medication list. I have reviewed chart for other potential  high risk medication/s and harmful drug interactions in the elderly.          Aspirin is not on active  "medication list.  Aspirin use is not indicated based on review of current medical condition/s. Risk of harm outweighs potential benefits.  .    Patient Active Problem List   Diagnosis   • Atypical ductal hyperplasia of breast   • Thymoma   • Chronic cough   • Constipation   • Disorder of diaphragm   • Early satiety   • Edema of lower extremity   • Esophageal dysphagia   • Fatigue   • HTN (hypertension)   • Hypothyroidism   • Right knee pain   • Rupture of patellar tendon   • Vitamin B12 deficiency   • Vitamin D deficiency   • Acute otitis media   • Cough   • COVID-19 virus infection   • S/P lumpectomy, right breast   • Shortness of breath   • Rectal bleeding   • Malignant neoplasm of thymus   • Low vitamin D level   • Low serum vitamin B12   • Lateral epicondylitis   • History of tonsillectomy   • Edema of lower extremity   • Bilateral chronic serous otitis media     Advance Care Planning   Advance Care Planning     Advance Directive is not on file.  ACP discussion was held with the patient during this visit. Patient does not have an advance directive, information provided.     Objective    Vitals:    05/15/23 1004   BP: 120/74   BP Location: Left arm   Patient Position: Sitting   Cuff Size: Adult   Pulse: 80   Resp: 18   Temp: 97.8 °F (36.6 °C)   TempSrc: Temporal   SpO2: 98%   Weight: 76.7 kg (169 lb)   Height: 165.1 cm (65\")     Estimated body mass index is 28.12 kg/m² as calculated from the following:    Height as of this encounter: 165.1 cm (65\").    Weight as of this encounter: 76.7 kg (169 lb).    BMI is >= 25 and <30. (Overweight) The following options were offered after discussion;: exercise counseling/recommendations and nutrition counseling/recommendations      Does the patient have evidence of cognitive impairment?   No            HEALTH RISK ASSESSMENT    Smoking Status:  Social History     Tobacco Use   Smoking Status Never   • Passive exposure: Never   Smokeless Tobacco Never     Alcohol " Consumption:  Social History     Substance and Sexual Activity   Alcohol Use Never     Fall Risk Screen:    KAMARADI Fall Risk Assessment was completed, and patient is at LOW risk for falls.Assessment completed on:5/15/2023    Depression Screenin/15/2023    10:12 AM   PHQ-2/PHQ-9 Depression Screening   Little Interest or Pleasure in Doing Things 0-->not at all   Feeling Down, Depressed or Hopeless 0-->not at all   PHQ-9: Brief Depression Severity Measure Score 0       Health Habits and Functional and Cognitive Screenin/15/2023    10:00 AM   Functional & Cognitive Status   Do you have difficulty preparing food and eating? No   Do you have difficulty bathing yourself, getting dressed or grooming yourself? No   Do you have difficulty using the toilet? No   Do you have difficulty moving around from place to place? No   Do you have trouble with steps or getting out of a bed or a chair? No   Current Diet Well Balanced Diet   Dental Exam Up to date   Eye Exam Up to date   Exercise (times per week) 0 times per week   Current Exercises Include Walking   Do you need help using the phone?  No   Are you deaf or do you have serious difficulty hearing?  No   Do you need help with transportation? No   Do you need help shopping? No   Do you need help preparing meals?  No   Do you need help with housework?  No   Do you need help with laundry? No   Do you need help taking your medications? No   Do you need help managing money? No   Do you ever drive or ride in a car without wearing a seat belt? No   Have you felt unusual stress, anger or loneliness in the last month? No   Who do you live with? Spouse   If you need help, do you have trouble finding someone available to you? No   Do you have difficulty concentrating, remembering or making decisions? No       Age-appropriate Screening Schedule:  Refer to the list below for future screening recommendations based on patient's age, sex and/or medical conditions. Orders for  these recommended tests are listed in the plan section. The patient has been provided with a written plan.    Health Maintenance   Topic Date Due   • TDAP/TD VACCINES (1 - Tdap) Never done   • ZOSTER VACCINE (1 of 2) Never done   • ANNUAL WELLNESS VISIT  Never done   • INFLUENZA VACCINE  08/01/2023   • COLORECTAL CANCER SCREENING  09/11/2023   • MAMMOGRAM  04/07/2025   • HEPATITIS C SCREENING  Completed   • COVID-19 Vaccine  Completed   • Pneumococcal Vaccine 0-64  Aged Out                  CMS Preventative Services Quick Reference  Risk Factors Identified During Encounter:    None Identified    The above risks/problems have been discussed with the patient.  Pertinent information has been shared with the patient in the After Visit Summary.    Diagnoses and all orders for this visit:    1. Encounter for subsequent annual wellness visit (AWV) in Medicare patient (Primary)    2. Primary hypertension  -     Comprehensive Metabolic Panel  -     CBC & Differential  -     Lipid Panel    3. Hypothyroidism, unspecified type  -     TSH    4. Vitamin D deficiency  -     vitamin D (ERGOCALCIFEROL) 1.25 MG (33316 UT) capsule capsule; Take 1 capsule by mouth Every 7 (Seven) Days.  Dispense: 12 capsule; Refill: 3  -     Vitamin D,25-Hydroxy    Other orders  -     ammonium lactate (AmLactin) 12 % lotion; Apply  topically to the appropriate area as directed As Needed for Dry Skin.  Dispense: 225 g; Refill: 1  -     clotrimazole-betamethasone (Lotrisone) 1-0.05 % cream; Apply 1 application topically to the appropriate area as directed 2 (Two) Times a Day.  Dispense: 45 g; Refill: 1  -     levothyroxine (Synthroid) 50 MCG tablet; Take 1 tablet by mouth Daily.  Dispense: 90 tablet; Refill: 1  -     triamterene-hydrochlorothiazide (MAXZIDE-25) 37.5-25 MG per tablet; Take 0.5 tablets by mouth Daily.  Dispense: 45 tablet; Refill: 1        Follow Up:   Next Medicare Wellness visit to be scheduled in 1 year.    Return in about 6 months  (around 11/15/2023) for Next scheduled follow up.    An After Visit Summary and PPPS were made available to the patient.

## 2023-06-07 ENCOUNTER — OFFICE VISIT (OUTPATIENT)
Dept: INTERNAL MEDICINE | Facility: CLINIC | Age: 63
End: 2023-06-07
Payer: MEDICARE

## 2023-06-07 VITALS
BODY MASS INDEX: 28.12 KG/M2 | OXYGEN SATURATION: 98 % | SYSTOLIC BLOOD PRESSURE: 124 MMHG | HEART RATE: 95 BPM | DIASTOLIC BLOOD PRESSURE: 73 MMHG | HEIGHT: 65 IN | WEIGHT: 168.8 LBS | TEMPERATURE: 98.3 F

## 2023-06-07 DIAGNOSIS — B37.2 YEAST INFECTION OF THE SKIN: Primary | ICD-10-CM

## 2023-06-07 RX ORDER — FLUCONAZOLE 150 MG/1
150 TABLET ORAL ONCE
Qty: 2 TABLET | Refills: 0 | Status: SHIPPED | OUTPATIENT
Start: 2023-06-07 | End: 2023-06-07

## 2023-06-07 NOTE — PROGRESS NOTES
"Chief Complaint  stomach rash (Pt states she keeps getting yeast infections under he stomach area would like to discuss)    Subjective        Oksana Smith presents [place] [facility] for concerns for yeast infection and skin fold of stomach.  Patient reports this has been going on for 3 weeks, she has used scription cream and powder, not helping.  Has had to use Diflucan in the past, looking to get prescription today.  No other significant concerns today.    Objective   Vital Signs:   /73 (BP Location: Right arm, Patient Position: Sitting, Cuff Size: Adult)   Pulse 95   Temp 98.3 °F (36.8 °C) (Temporal)   Ht 165.1 cm (65\")   Wt 76.6 kg (168 lb 12.8 oz)   SpO2 98%   BMI 28.09 kg/m²     Physical Exam  Constitutional:       Appearance: Normal appearance.   HENT:      Head: Normocephalic and atraumatic.   Pulmonary:      Effort: Pulmonary effort is normal.   Skin:     General: Skin is warm and dry.   Neurological:      Mental Status: She is alert.   Psychiatric:         Mood and Affect: Mood normal.      Result Review :  {The following data was reviewed by ISAIAS Fu on 06/07/23                Diagnoses and all orders for this visit:    1. Yeast infection of the skin (Primary)    Other orders  -     fluconazole (Diflucan) 150 MG tablet; Take 1 tablet by mouth 1 (One) Time for 1 dose. Take second dose if needed after 48 hours  Dispense: 2 tablet; Refill: 0    Diflucan sent, take 1 today, if not improving after 48 hours, take second.  Follow-up as needed.      Follow Up   No follow-ups on file.  Patient was given instructions and counseling regarding her condition or for health maintenance advice. Please see specific information pulled into the AVS if appropriate.     ISAIAS Fu  6/7/2023  This note was electronically signed.       "

## 2023-07-07 ENCOUNTER — HOSPITAL ENCOUNTER (OUTPATIENT)
Dept: OTHER | Facility: HOSPITAL | Age: 63
Discharge: HOME | End: 2023-07-07

## 2023-11-15 ENCOUNTER — OFFICE VISIT (OUTPATIENT)
Dept: INTERNAL MEDICINE | Facility: CLINIC | Age: 63
End: 2023-11-15
Payer: MEDICARE

## 2023-11-15 VITALS
WEIGHT: 167.25 LBS | BODY MASS INDEX: 27.86 KG/M2 | HEART RATE: 87 BPM | DIASTOLIC BLOOD PRESSURE: 76 MMHG | OXYGEN SATURATION: 98 % | SYSTOLIC BLOOD PRESSURE: 126 MMHG | HEIGHT: 65 IN | RESPIRATION RATE: 20 BRPM | TEMPERATURE: 97.8 F

## 2023-11-15 DIAGNOSIS — I10 PRIMARY HYPERTENSION: ICD-10-CM

## 2023-11-15 DIAGNOSIS — S39.92XD INJURY OF COCCYX, SUBSEQUENT ENCOUNTER: ICD-10-CM

## 2023-11-15 DIAGNOSIS — E03.9 HYPOTHYROIDISM, UNSPECIFIED TYPE: ICD-10-CM

## 2023-11-15 DIAGNOSIS — Z20.822 EXPOSURE TO COVID-19 VIRUS: Primary | ICD-10-CM

## 2023-11-15 DIAGNOSIS — E55.9 VITAMIN D DEFICIENCY: ICD-10-CM

## 2023-11-15 LAB
25(OH)D3 SERPL-MCNC: 36 NG/ML (ref 30–100)
ALBUMIN SERPL-MCNC: 4.2 G/DL (ref 3.5–5.2)
ALBUMIN/GLOB SERPL: 1.4 G/DL
ALP SERPL-CCNC: 71 U/L (ref 39–117)
ALT SERPL W P-5'-P-CCNC: 5 U/L (ref 1–33)
ANION GAP SERPL CALCULATED.3IONS-SCNC: 9.7 MMOL/L (ref 5–15)
AST SERPL-CCNC: 13 U/L (ref 1–32)
BASOPHILS # BLD AUTO: 0.09 10*3/MM3 (ref 0–0.2)
BASOPHILS NFR BLD AUTO: 1.4 % (ref 0–1.5)
BILIRUB SERPL-MCNC: 0.5 MG/DL (ref 0–1.2)
BUN SERPL-MCNC: 18 MG/DL (ref 8–23)
BUN/CREAT SERPL: 16.5 (ref 7–25)
CALCIUM SPEC-SCNC: 9.5 MG/DL (ref 8.6–10.5)
CHLORIDE SERPL-SCNC: 103 MMOL/L (ref 98–107)
CHOLEST SERPL-MCNC: 213 MG/DL (ref 0–200)
CO2 SERPL-SCNC: 30.3 MMOL/L (ref 22–29)
CREAT SERPL-MCNC: 1.09 MG/DL (ref 0.57–1)
DEPRECATED RDW RBC AUTO: 41.8 FL (ref 37–54)
EGFRCR SERPLBLD CKD-EPI 2021: 58.3 ML/MIN/1.73
EOSINOPHIL # BLD AUTO: 0.19 10*3/MM3 (ref 0–0.4)
EOSINOPHIL NFR BLD AUTO: 3 % (ref 0.3–6.2)
ERYTHROCYTE [DISTWIDTH] IN BLOOD BY AUTOMATED COUNT: 12.7 % (ref 12.3–15.4)
EXPIRATION DATE: NORMAL
FLUAV AG UPPER RESP QL IA.RAPID: NOT DETECTED
FLUBV AG UPPER RESP QL IA.RAPID: NOT DETECTED
GLOBULIN UR ELPH-MCNC: 2.9 GM/DL
GLUCOSE SERPL-MCNC: 98 MG/DL (ref 65–99)
HCT VFR BLD AUTO: 43.5 % (ref 34–46.6)
HDLC SERPL-MCNC: 51 MG/DL (ref 40–60)
HGB BLD-MCNC: 14.6 G/DL (ref 12–15.9)
IMM GRANULOCYTES # BLD AUTO: 0.02 10*3/MM3 (ref 0–0.05)
IMM GRANULOCYTES NFR BLD AUTO: 0.3 % (ref 0–0.5)
INTERNAL CONTROL: NORMAL
LDLC SERPL CALC-MCNC: 145 MG/DL (ref 0–100)
LDLC/HDLC SERPL: 2.8 {RATIO}
LYMPHOCYTES # BLD AUTO: 1.79 10*3/MM3 (ref 0.7–3.1)
LYMPHOCYTES NFR BLD AUTO: 27.8 % (ref 19.6–45.3)
Lab: 879
MCH RBC QN AUTO: 30.2 PG (ref 26.6–33)
MCHC RBC AUTO-ENTMCNC: 33.6 G/DL (ref 31.5–35.7)
MCV RBC AUTO: 89.9 FL (ref 79–97)
MONOCYTES # BLD AUTO: 0.38 10*3/MM3 (ref 0.1–0.9)
MONOCYTES NFR BLD AUTO: 5.9 % (ref 5–12)
NEUTROPHILS NFR BLD AUTO: 3.96 10*3/MM3 (ref 1.7–7)
NEUTROPHILS NFR BLD AUTO: 61.6 % (ref 42.7–76)
NRBC BLD AUTO-RTO: 0 /100 WBC (ref 0–0.2)
PLATELET # BLD AUTO: 361 10*3/MM3 (ref 140–450)
PMV BLD AUTO: 10.2 FL (ref 6–12)
POTASSIUM SERPL-SCNC: 3.4 MMOL/L (ref 3.5–5.2)
PROT SERPL-MCNC: 7.1 G/DL (ref 6–8.5)
RBC # BLD AUTO: 4.84 10*6/MM3 (ref 3.77–5.28)
SARS-COV-2 AG UPPER RESP QL IA.RAPID: NOT DETECTED
SODIUM SERPL-SCNC: 143 MMOL/L (ref 136–145)
TRIGL SERPL-MCNC: 96 MG/DL (ref 0–150)
TSH SERPL DL<=0.05 MIU/L-ACNC: 1.03 UIU/ML (ref 0.27–4.2)
VLDLC SERPL-MCNC: 17 MG/DL (ref 5–40)
WBC NRBC COR # BLD: 6.43 10*3/MM3 (ref 3.4–10.8)

## 2023-11-15 PROCEDURE — 80061 LIPID PANEL: CPT | Performed by: INTERNAL MEDICINE

## 2023-11-15 PROCEDURE — 82306 VITAMIN D 25 HYDROXY: CPT | Performed by: INTERNAL MEDICINE

## 2023-11-15 PROCEDURE — 84443 ASSAY THYROID STIM HORMONE: CPT | Performed by: INTERNAL MEDICINE

## 2023-11-15 PROCEDURE — 80053 COMPREHEN METABOLIC PANEL: CPT | Performed by: INTERNAL MEDICINE

## 2023-11-15 PROCEDURE — 85025 COMPLETE CBC W/AUTO DIFF WBC: CPT | Performed by: INTERNAL MEDICINE

## 2023-11-15 RX ORDER — TRIAMTERENE AND HYDROCHLOROTHIAZIDE 37.5; 25 MG/1; MG/1
0.5 TABLET ORAL DAILY
Qty: 45 TABLET | Refills: 1 | Status: SHIPPED | OUTPATIENT
Start: 2023-11-15

## 2023-11-15 RX ORDER — ERGOCALCIFEROL 1.25 MG/1
50000 CAPSULE ORAL
Qty: 12 CAPSULE | Refills: 3 | Status: SHIPPED | OUTPATIENT
Start: 2023-11-15

## 2023-11-15 RX ORDER — LEVOTHYROXINE SODIUM 0.05 MG/1
50 TABLET ORAL DAILY
Qty: 90 TABLET | Refills: 1 | Status: SHIPPED | OUTPATIENT
Start: 2023-11-15

## 2023-11-15 RX ORDER — CLOTRIMAZOLE AND BETAMETHASONE DIPROPIONATE 10; .64 MG/G; MG/G
1 CREAM TOPICAL 2 TIMES DAILY
Qty: 45 G | Refills: 1 | Status: SHIPPED | OUTPATIENT
Start: 2023-11-15

## 2023-11-15 NOTE — PROGRESS NOTES
"Chief Complaint  Follow-up (6 month follow up hypothyroidism, exposure to covid, mom passed away 2 days ago ) and Hypothyroidism    Subjective       Oksanatanja Smith presents to [facility]    Hypothyroidism       Presenting for follow up.     Hypothyroidism: due for follow up labs    Was exposed to COVID 3 days ago. Jaya recently passed away and wanting to make sure she is negative before  arrangements, etc.     Having pain in her tailbone. Had cyst removed from that area many years ago. Wanting imaging to check.     Objective     Vitals:    11/15/23 1144   BP: 126/76   BP Location: Left arm   Patient Position: Sitting   Cuff Size: Adult   Pulse: 87   Resp: 20   Temp: 97.8 °F (36.6 °C)   TempSrc: Temporal   SpO2: 98%   Weight: 75.9 kg (167 lb 4 oz)   Height: 165.1 cm (65\")      Wt Readings from Last 3 Encounters:   11/15/23 75.9 kg (167 lb 4 oz)   23 76.6 kg (168 lb 12.8 oz)   05/15/23 76.7 kg (169 lb)      BP Readings from Last 3 Encounters:   11/15/23 126/76   23 124/73   05/15/23 120/74        Body mass index is 27.83 kg/m².             Physical Exam  Vitals reviewed.   Constitutional:       Appearance: Normal appearance. She is well-developed.   HENT:      Head: Normocephalic and atraumatic.      Mouth/Throat:      Pharynx: No oropharyngeal exudate.   Eyes:      Conjunctiva/sclera: Conjunctivae normal.      Pupils: Pupils are equal, round, and reactive to light.   Neck:      Thyroid: No thyromegaly or thyroid tenderness.   Cardiovascular:      Rate and Rhythm: Normal rate and regular rhythm.      Heart sounds: No murmur heard.     No friction rub. No gallop.   Pulmonary:      Effort: Pulmonary effort is normal.      Breath sounds: Normal breath sounds. No wheezing or rhonchi.   Lymphadenopathy:      Cervical: No cervical adenopathy.   Skin:     General: Skin is warm and dry.   Neurological:      Mental Status: She is alert and oriented to " person, place, and time.   Psychiatric:         Mood and Affect: Affect normal.          Result Review :   The following data was reviewed by: Portia Serna MD on 11/15/2023:  CMP          5/15/2023    10:50   CMP   Glucose 88    BUN 20    Creatinine 0.97    EGFR 67.0    Sodium 140    Potassium 3.6    Chloride 101    Calcium 9.6    Total Protein 7.1    Albumin 4.1    Globulin 3.0    Total Bilirubin 0.6    Alkaline Phosphatase 71    AST (SGOT) 15    ALT (SGPT) 14    Albumin/Globulin Ratio 1.4    BUN/Creatinine Ratio 20.6    Anion Gap 10.1      CBC          5/15/2023    10:50   CBC   WBC 6.36    RBC 4.97    Hemoglobin 14.9    Hematocrit 44.3    MCV 89.1    MCH 30.0    MCHC 33.6    RDW 12.7    Platelets 385      Lipid Panel          5/15/2023    10:50   Lipid Panel   Total Cholesterol 243    Triglycerides 143    HDL Cholesterol 51    VLDL Cholesterol 26    LDL Cholesterol  166    LDL/HDL Ratio 3.20      TSH          5/15/2023    10:50   TSH   TSH 1.600      Lab Results   Component Value Date    SARSANTIGEN Not Detected 11/15/2023    COVID19 Not Detected 11/11/2022    FLUAAG Not Detected 11/15/2023    FLUBAG Not Detected 11/15/2023           Procedures    Assessment and Plan   Diagnoses and all orders for this visit:    1. Exposure to COVID-19 virus (Primary)  -     POCT SARS-CoV-2 Antigen YUKI + Flu    2. Injury of coccyx, subsequent encounter  -     XR Sacrum & Coccyx; Future    3. Primary hypertension  Assessment & Plan:  Well controlled in clinic today  Continue current management    Orders:  -     Comprehensive Metabolic Panel  -     CBC & Differential  -     Lipid Panel    4. Hypothyroidism, unspecified type  Assessment & Plan:  Checking follow up labs    Orders:  -     TSH    5. Vitamin D deficiency  -     Vitamin D,25-Hydroxy  -     vitamin D (ERGOCALCIFEROL) 1.25 MG (07853 UT) capsule capsule; Take 1 capsule by mouth Every 7 (Seven) Days.  Dispense: 12 capsule; Refill: 3    Other orders  -      clotrimazole-betamethasone (Lotrisone) 1-0.05 % cream; Apply 1 application  topically to the appropriate area as directed 2 (Two) Times a Day.  Dispense: 45 g; Refill: 1  -     levothyroxine (Synthroid) 50 MCG tablet; Take 1 tablet by mouth Daily.  Dispense: 90 tablet; Refill: 1  -     triamterene-hydrochlorothiazide (MAXZIDE-25) 37.5-25 MG per tablet; Take 0.5 tablets by mouth Daily.  Dispense: 45 tablet; Refill: 1          Follow Up   Return in about 6 months (around 5/15/2024) for Next scheduled follow up.  Patient was given instructions and counseling regarding her condition or for health maintenance advice. Please see specific information pulled into the AVS if appropriate.

## 2023-11-20 ENCOUNTER — HOSPITAL ENCOUNTER (OUTPATIENT)
Dept: GENERAL RADIOLOGY | Facility: HOSPITAL | Age: 63
Discharge: HOME | End: 2023-11-20
Admitting: INTERNAL MEDICINE
Payer: MEDICARE

## 2023-11-20 DIAGNOSIS — S39.92XD INJURY OF COCCYX, SUBSEQUENT ENCOUNTER: ICD-10-CM

## 2023-11-20 PROCEDURE — 72220 X-RAY EXAM SACRUM TAILBONE: CPT

## 2023-12-01 ENCOUNTER — CLINICAL SUPPORT (OUTPATIENT)
Dept: GASTROENTEROLOGY | Facility: CLINIC | Age: 63
End: 2023-12-01

## 2023-12-01 ENCOUNTER — PREP FOR SURGERY (OUTPATIENT)
Dept: OTHER | Facility: HOSPITAL | Age: 63
End: 2023-12-01
Payer: OTHER GOVERNMENT

## 2023-12-01 DIAGNOSIS — Z86.010 HISTORY OF COLON POLYPS: Primary | ICD-10-CM

## 2023-12-01 PROBLEM — Z86.0100 HISTORY OF COLON POLYPS: Status: ACTIVE | Noted: 2023-12-01

## 2023-12-01 RX ORDER — POLYETHYLENE GLYCOL 3350, SODIUM SULFATE, SODIUM CHLORIDE, POTASSIUM CHLORIDE, ASCORBIC ACID, SODIUM ASCORBATE 140-9-5.2G
1 KIT ORAL TAKE AS DIRECTED
Qty: 1 EACH | Refills: 0 | Status: SHIPPED | OUTPATIENT
Start: 2023-12-01 | End: 2023-12-02

## 2023-12-01 NOTE — PROGRESS NOTES
Oksana Josekeisha Smith  1960  62 y.o.    Reason for call: Recall    Prep prescribed: Plenvu  Prep instructions reviewed with patient and sent to patient via regular mail to the home address on file    Clearance needed? none    Is the patient currently on any injectable medications for weight loss or diabetes? No      Family history of colon cancer? No  If yes, indicate relative:     The patient has been scheduled for: Colonoscopy  Procedure Date: 2.19.24  Family History   Problem Relation Age of Onset    Breast cancer Mother     Heart disease Mother     Cancer Mother     Arthritis Mother     Heart disease Father     Arthritis Father     Diabetes Sister     Colon cancer Neg Hx      Past Medical History:   Diagnosis Date    Atypical ductal hyperplasia of right breast     Breast lump     Cancer     Chronic cough     Colon polyp     Constipation     Early satiety     Esophageal dysphagia     Fatigue     Hemidiaphragm paralysis     HTN (hypertension)     Hypothyroidism     Lateral epicondylitis of left elbow 03/14/2018    OM (otitis media)     Rectal bleeding     Right knee pain 08/30/2018    Shortness of breath     Tear of PCL (posterior cruciate ligament) of knee, right, initial encounter 08/30/2018    Thymoma, malignant     Thyroid disorder     Vitamin B12 deficiency     Vitamin D deficiency      Allergies   Allergen Reactions    Clindamycin Hives, Itching and Nausea And Vomiting    Erythromycin Hives, Itching, GI Intolerance and Unknown - High Severity     Past Surgical History:   Procedure Laterality Date    BREAST LUMPECTOMY Right     COLONOSCOPY      Dr. Roque    ENDOSCOPY  09/11/2018    Dr. Roque    HYSTERECTOMY  1994    TONSILLECTOMY  1967     Social History     Socioeconomic History    Marital status:    Tobacco Use    Smoking status: Never     Passive exposure: Never    Smokeless tobacco: Never   Vaping Use    Vaping Use: Never used   Substance and Sexual Activity    Alcohol use: Never    Drug use:  Never    Sexual activity: Defer       Current Outpatient Medications:     ammonium lactate (AmLactin) 12 % lotion, Apply  topically to the appropriate area as directed As Needed for Dry Skin., Disp: 225 g, Rfl: 1    clotrimazole-betamethasone (Lotrisone) 1-0.05 % cream, Apply 1 application  topically to the appropriate area as directed 2 (Two) Times a Day., Disp: 45 g, Rfl: 1    levothyroxine (Synthroid) 50 MCG tablet, Take 1 tablet by mouth Daily., Disp: 90 tablet, Rfl: 1    triamterene-hydrochlorothiazide (MAXZIDE-25) 37.5-25 MG per tablet, Take 0.5 tablets by mouth Daily., Disp: 45 tablet, Rfl: 1    vitamin D (ERGOCALCIFEROL) 1.25 MG (06926 UT) capsule capsule, Take 1 capsule by mouth Every 7 (Seven) Days., Disp: 12 capsule, Rfl: 3    neomycin-polymyxin-hydrocortisone (CORTISPORIN) 3.5-40190-4 otic suspension, , Disp: , Rfl:     PreviDent 5000 Plus 1.1 % cream, , Disp: , Rfl:

## 2023-12-16 ENCOUNTER — APPOINTMENT (OUTPATIENT)
Dept: GENERAL RADIOLOGY | Facility: HOSPITAL | Age: 63
End: 2023-12-16
Payer: MEDICARE

## 2023-12-16 ENCOUNTER — HOSPITAL ENCOUNTER (EMERGENCY)
Facility: HOSPITAL | Age: 63
Discharge: HOME | End: 2023-12-16
Attending: EMERGENCY MEDICINE
Payer: OTHER GOVERNMENT

## 2023-12-16 ENCOUNTER — APPOINTMENT (OUTPATIENT)
Facility: HOSPITAL | Age: 63
End: 2023-12-16
Payer: OTHER GOVERNMENT

## 2023-12-16 VITALS
TEMPERATURE: 98.2 F | HEIGHT: 65 IN | BODY MASS INDEX: 28.72 KG/M2 | HEART RATE: 90 BPM | DIASTOLIC BLOOD PRESSURE: 79 MMHG | SYSTOLIC BLOOD PRESSURE: 136 MMHG | RESPIRATION RATE: 18 BRPM | OXYGEN SATURATION: 98 % | WEIGHT: 172.4 LBS

## 2023-12-16 DIAGNOSIS — M25.562 ACUTE PAIN OF LEFT KNEE: Primary | ICD-10-CM

## 2023-12-16 DIAGNOSIS — M23.42 BODIES, LOOSE, JOINT, KNEE, LEFT: ICD-10-CM

## 2023-12-16 PROCEDURE — 93971 EXTREMITY STUDY: CPT

## 2023-12-16 PROCEDURE — 99284 EMERGENCY DEPT VISIT MOD MDM: CPT

## 2023-12-16 PROCEDURE — 93971 EXTREMITY STUDY: CPT | Performed by: SURGERY

## 2023-12-16 PROCEDURE — 73562 X-RAY EXAM OF KNEE 3: CPT

## 2023-12-16 NOTE — ED PROVIDER NOTES
Time: 10:52 AM EST  Date of encounter:  12/16/2023  Independent Historian/Clinical History and Information was obtained by:   Patient    History is limited by: N/A    Chief Complaint: Knee pain      History of Present Illness:  Patient is a 63 y.o. year old female who presents to the emergency department for evaluation of knee pain.  Patient presents to the ER today complaining of left knee pain.  She states that 1 week ago she had been out of town visiting family in [place] and while she was there she began having some popping of the left knee.  She states that she does not really remember an injury but states that she does notice when she was going down the stairs that her left knee gave out on her and that is when the symptoms seem to start.  Patient states that she thought she may have just pulled a muscle but the symptoms have continued for 1 week.  Patient states the pain is intermittent and she also has some stiffness and swelling of the left knee that is intermittent.  Patient's pain is minimal at this time but she states at home her pain was rated 8 out of 10.  Patient voices concern that she would like to be checked for DVT due to her recent travel.  Patient denies any history of DVT or PE.  Patient denies any chest pain or shortness of breath.  No other complaints.    HPI    Patient Care Team  Primary Care Provider: Portia Serna MD    Past Medical History:     Allergies   Allergen Reactions    Clindamycin Hives, Itching and Nausea And Vomiting    Erythromycin Hives, Itching, GI Intolerance and Unknown - High Severity     Past Medical History:   Diagnosis Date    Atypical ductal hyperplasia of right breast     Breast lump     Cancer     Chronic cough     Colon polyp     Constipation     Early satiety     Esophageal dysphagia     Fatigue     Hemidiaphragm paralysis     HTN (hypertension)     Hypothyroidism     Lateral epicondylitis of left elbow 03/14/2018    OM (otitis media)      Rectal bleeding     Right knee pain 08/30/2018    Shortness of breath     Tear of PCL (posterior cruciate ligament) of knee, right, initial encounter 08/30/2018    Thymoma, malignant     Thyroid disorder     Vitamin B12 deficiency     Vitamin D deficiency      Past Surgical History:   Procedure Laterality Date    BREAST LUMPECTOMY Right     COLONOSCOPY      Dr. Roque    ENDOSCOPY  09/11/2018    Dr. Roque    HYSTERECTOMY  1994    TONSILLECTOMY  1967     Family History   Problem Relation Age of Onset    Breast cancer Mother     Heart disease Mother     Cancer Mother     Arthritis Mother     Heart disease Father     Arthritis Father     Diabetes Sister     Colon cancer Neg Hx        Home Medications:  Prior to Admission medications    Medication Sig Start Date End Date Taking? Authorizing Provider   ammonium lactate (AmLactin) 12 % lotion Apply  topically to the appropriate area as directed As Needed for Dry Skin. 5/15/23   Portia Serna MD   clotrimazole-betamethasone (Lotrisone) 1-0.05 % cream Apply 1 application  topically to the appropriate area as directed 2 (Two) Times a Day. 11/15/23   Portia Serna MD   levothyroxine (Synthroid) 50 MCG tablet Take 1 tablet by mouth Daily. 11/15/23   Portia Serna MD   neomycin-polymyxin-hydrocortisone (CORTISPORIN) 3.5-91134-2 otic suspension  10/1/21   ProviderPauly MD   PreviDent 5000 Plus 1.1 % cream  12/1/22   ProviderPauly MD   triamterene-hydrochlorothiazide (MAXZIDE-25) 37.5-25 MG per tablet Take 0.5 tablets by mouth Daily. 11/15/23   Portia Serna MD   vitamin D (ERGOCALCIFEROL) 1.25 MG (68022 UT) capsule capsule Take 1 capsule by mouth Every 7 (Seven) Days. 11/15/23   Portia Serna MD        Social History:   Social History     Tobacco Use    Smoking status: Never     Passive exposure: Never    Smokeless tobacco: Never   Vaping Use    Vaping Use: Never used   Substance Use Topics     "Alcohol use: Never    Drug use: Never         Review of Systems:  Review of Systems   Respiratory:  Negative for shortness of breath.    Cardiovascular:  Negative for chest pain.   Musculoskeletal:  Positive for arthralgias and joint swelling.   Skin:  Negative for color change and wound.   All other systems reviewed and are negative.       Physical Exam:  /79 (BP Location: Left arm, Patient Position: Sitting)   Pulse 90   Temp 98.2 °F (36.8 °C) (Oral)   Resp 18   Ht 165.1 cm (65\")   Wt 78.2 kg (172 lb 6.4 oz)   SpO2 98%   BMI 28.69 kg/m²     Physical Exam  Vitals and nursing note reviewed.   Constitutional:       General: She is not in acute distress.     Appearance: Normal appearance. She is normal weight. She is not ill-appearing, toxic-appearing or diaphoretic.   HENT:      Head: Normocephalic and atraumatic.      Nose: Nose normal.   Eyes:      Extraocular Movements: Extraocular movements intact.      Conjunctiva/sclera: Conjunctivae normal.      Pupils: Pupils are equal, round, and reactive to light.   Cardiovascular:      Rate and Rhythm: Normal rate and regular rhythm.      Heart sounds: Normal heart sounds.   Pulmonary:      Effort: Pulmonary effort is normal.      Breath sounds: Normal breath sounds.   Abdominal:      General: Abdomen is flat. There is no distension.   Musculoskeletal:         General: Normal range of motion.      Cervical back: Normal range of motion and neck supple.      Left knee: Swelling present. No deformity, effusion, erythema, ecchymosis, lacerations or bony tenderness. Normal range of motion. No tenderness. No LCL laxity, MCL laxity, ACL laxity or PCL laxity.Normal alignment. Normal pulse.      Instability Tests: Anterior drawer test negative. Posterior drawer test negative.   Skin:     General: Skin is warm and dry.   Neurological:      General: No focal deficit present.      Mental Status: She is alert and oriented to person, place, and time.   Psychiatric:         " Mood and Affect: Mood normal.         Behavior: Behavior normal.         Thought Content: Thought content normal.         Judgment: Judgment normal.                Procedures:  Procedures      Medical Decision Making:    Comorbidities that affect care:    Cancer, Hypertension, Thyroid Disease    External Notes reviewed:    Previous Clinic Note: Patient last seen by internal medicine on 11- for follow-up of hypothyroidism      The following orders were placed and all results were independently analyzed by me:  Orders Placed This Encounter   Procedures    [facility] 04.  Hinged Knee Brace    XR Knee 3 View Left    Ambulatory Referral to Orthopedic Surgery    Obtain & Apply The Following- Lower extremity; Hinged knee brace       Medications Given in the Emergency Department:  Medications - No data to display     ED Course:    ED Course as of 12/16/23 1157   Sat Dec 16, 2023   1138 XR Knee 3 View Left  Left knee series demonstrating mild degenerative change consistent with osteoarthritis.     Tiny bony densities may represent osteochondral or loose bodies.     Small joint effusion.   [MD]   1149 Vascular ultrasound tech reports preliminary result patient is negative for DVT. [MD]      ED Course User Index  [MD] Ej Kirby, PA-C       Labs:    Lab Results (last 24 hours)       ** No results found for the last 24 hours. **             Imaging:    XR Knee 3 View Left    Result Date: 12/16/2023  PROCEDURE: XR KNEE 3 VW LEFT  COMPARISON: [facility], , KNEE 3 VIEWS LT, 5/05/2018, 15:40.  INDICATIONS: NO KNOWN INJURY COMPLAINS OF LEFT KNEE PAIN  FINDINGS:  No fractures are visualized.  Mild degenerative change consistent with osteoarthritis is evident.  Tiny bony densities seen on the AP and oblique views may represent osteochondral or loose bodies.  A small joint effusion is evident.        Left knee series demonstrating mild degenerative change consistent with osteoarthritis.  Tiny  bony densities may represent osteochondral or loose bodies.  Small joint effusion.      TELLY CANDELARIA MD       Electronically Signed and Approved By: TELYL CANDELARIA MD on 12/16/2023 at 11:32                Differential Diagnosis and Discussion:    Joint Pain: Differential diagnosis includes but is not limited to polyarticular arthritis, gout, tendinitis, hemarthrosis, septic arthritis, rheumatoid arthritis, bursitis, degenerative joint disease, joint effusion, autoimmune disorder, trauma, and occult neoplasm.    All X-rays impressions were independently interpreted by me.  Ultrasound impression was interpreted by me.     MDM  Number of Diagnoses or Management Options  Acute pain of left knee  Diagnosis management comments: Patient presented to the emergency department for evaluation of left knee pain and concern for DVT.  Duplex ultrasound was negative for DVT.  X-ray of the left knee was obtained and does note osteoarthritis with possible osteochondral loose bodies.  I will place patient in a hinged knee brace and have her follow-up with orthopedics for further evaluation.       Amount and/or Complexity of Data Reviewed  Tests in the radiology section of CPT®: ordered and reviewed    Risk of Complications, Morbidity, and/or Mortality  Presenting problems: moderate  Diagnostic procedures: low  Management options: low    Patient Progress  Patient progress: stable       Patient Care Considerations:    NARCOTICS: I considered prescribing opiate pain medication as an outpatient, however there is no fracture noted on x-ray      Consultants/Shared Management Plan:    None    Social Determinants of Health:    Patient is independent, reliable, and has access to care.       Disposition and Care Coordination:    Discharged: The patient is suitable and stable for discharge with no need for consideration of observation or admission.    I have explained the patient´s condition, diagnoses and treatment plan based on the  information available to me at this time. I have answered questions and addressed any concerns. The patient has a good  understanding of the patient´s diagnosis, condition, and treatment plan as can be expected at this point. The vital signs have been stable. The patient´s condition is stable and appropriate for discharge from the emergency department.      The patient will pursue further outpatient evaluation with the primary care physician or other designated or consulting physician as outlined in the discharge instructions. They are agreeable to this plan of care and follow-up instructions have been explained in detail. The patient has received these instructions in written format and have expressed an understanding of the discharge instructions. The patient is aware that any significant change in condition or worsening of symptoms should prompt an immediate return to this or the closest emergency department or call to 911.  I have explained discharge medications and the need for follow up with the patient/caretakers. This was also printed in the discharge instructions. Patient was discharged with the following medications and follow up:      Medication List      No changes were made to your prescriptions during this visit.      Portia Serna MD  [address] [place], [place] [id number]  [phone]             Final diagnoses:   Acute pain of left knee   Bodies, loose, joint, knee, left        ED Disposition       ED Disposition   Discharge    Condition   Stable    Comment   --               This medical record created using voice recognition software.             Ej Kirby PA-C  12/16/23 6970

## 2023-12-17 LAB
BH CV LOWER VASCULAR LEFT COMMON FEMORAL AUGMENT: NORMAL
BH CV LOWER VASCULAR LEFT COMMON FEMORAL COMPETENT: NORMAL
BH CV LOWER VASCULAR LEFT COMMON FEMORAL COMPRESS: NORMAL
BH CV LOWER VASCULAR LEFT COMMON FEMORAL PHASIC: NORMAL
BH CV LOWER VASCULAR LEFT COMMON FEMORAL SPONT: NORMAL
BH CV LOWER VASCULAR LEFT DISTAL FEMORAL COMPRESS: NORMAL
BH CV LOWER VASCULAR LEFT GASTRONEMIUS COMPRESS: NORMAL
BH CV LOWER VASCULAR LEFT GREATER SAPH AK COMPRESS: NORMAL
BH CV LOWER VASCULAR LEFT GREATER SAPH BK COMPRESS: NORMAL
BH CV LOWER VASCULAR LEFT LESSER SAPH COMPRESS: NORMAL
BH CV LOWER VASCULAR LEFT MID FEMORAL AUGMENT: NORMAL
BH CV LOWER VASCULAR LEFT MID FEMORAL COMPETENT: NORMAL
BH CV LOWER VASCULAR LEFT MID FEMORAL COMPRESS: NORMAL
BH CV LOWER VASCULAR LEFT MID FEMORAL PHASIC: NORMAL
BH CV LOWER VASCULAR LEFT MID FEMORAL SPONT: NORMAL
BH CV LOWER VASCULAR LEFT PERONEAL COMPRESS: NORMAL
BH CV LOWER VASCULAR LEFT POPLITEAL AUGMENT: NORMAL
BH CV LOWER VASCULAR LEFT POPLITEAL COMPETENT: NORMAL
BH CV LOWER VASCULAR LEFT POPLITEAL COMPRESS: NORMAL
BH CV LOWER VASCULAR LEFT POPLITEAL PHASIC: NORMAL
BH CV LOWER VASCULAR LEFT POPLITEAL SPONT: NORMAL
BH CV LOWER VASCULAR LEFT POSTERIOR TIBIAL COMPRESS: NORMAL
BH CV LOWER VASCULAR LEFT PROXIMAL FEMORAL COMPRESS: NORMAL
BH CV LOWER VASCULAR LEFT SAPHENOFEMORAL JUNCTION COMPRESS: NORMAL
BH CV LOWER VASCULAR RIGHT COMMON FEMORAL AUGMENT: NORMAL
BH CV LOWER VASCULAR RIGHT COMMON FEMORAL COMPETENT: NORMAL
BH CV LOWER VASCULAR RIGHT COMMON FEMORAL COMPRESS: NORMAL
BH CV LOWER VASCULAR RIGHT COMMON FEMORAL PHASIC: NORMAL
BH CV LOWER VASCULAR RIGHT COMMON FEMORAL SPONT: NORMAL
BH CV VAS PRELIMINARY FINDINGS SCRIPTING: 1

## 2023-12-18 ENCOUNTER — TELEPHONE (OUTPATIENT)
Dept: ORTHOPEDIC SURGERY | Facility: CLINIC | Age: 63
End: 2023-12-18
Payer: MEDICARE

## 2023-12-19 ENCOUNTER — OFFICE VISIT (OUTPATIENT)
Dept: ORTHOPEDIC SURGERY | Facility: CLINIC | Age: 63
End: 2023-12-19
Payer: MEDICARE

## 2023-12-19 VITALS
SYSTOLIC BLOOD PRESSURE: 133 MMHG | BODY MASS INDEX: 28.66 KG/M2 | HEIGHT: 65 IN | DIASTOLIC BLOOD PRESSURE: 75 MMHG | WEIGHT: 172 LBS | OXYGEN SATURATION: 97 % | HEART RATE: 94 BPM

## 2023-12-19 DIAGNOSIS — M23.42 BODIES, LOOSE, JOINT, KNEE, LEFT: Primary | ICD-10-CM

## 2023-12-19 NOTE — PROGRESS NOTES
"Chief Complaint  Initial Evaluation of the Left Knee     Subjective      Oksana Smith presents to [facility] for evaluation of the left knee. She reports left knee pain. She denies injury or trauma. She reports she has been having bucking in her knee. She reports she had an x-ray and ultra-sound.     Allergies   Allergen Reactions    Clindamycin Hives, Itching and Nausea And Vomiting    Erythromycin Hives, Itching, GI Intolerance and Unknown - High Severity        Social History     Socioeconomic History    Marital status:    Tobacco Use    Smoking status: Never     Passive exposure: Never    Smokeless tobacco: Never   Vaping Use    Vaping Use: Never used   Substance and Sexual Activity    Alcohol use: Never    Drug use: Never    Sexual activity: Defer        I reviewed the patient's chief complaint, history of present illness, review of systems, past medical history, surgical history, family history, social history, medications, and allergy list.     Review of Systems     Constitutional: Denies fevers, chills, weight loss  Cardiovascular: Denies chest pain, shortness of breath  Skin: Denies rashes, acute skin changes  Neurologic: Denies headache, loss of consciousness  MSK: Left knee pain      Vital Signs:   /75   Pulse 94   Ht 165.1 cm (65\")   Wt 78 kg (172 lb)   SpO2 97%   BMI 28.62 kg/m²          Physical Exam  General: Alert. No acute distress    Ortho Exam      Left knee- Positive EHL, FHL, GS and TA. Sensation intact to all 5 nerves of the foot. Positive pulses. Stable to varus/valgus stress. Stable to anterior/posterior drawer. Negative Lachman's. Negative Paula's. No effusion. Tender to the anterior and lateral knee. Full extension. Flexion 110 degrees. Negative Paula's.     Procedures      Imaging Results (Most Recent)       None             Result Review :       Duplex Venous Lower Extremity - Left CAR    Result Date: 12/17/2023  Narrative:   " Normal left lower extremity venous duplex scan.     XR Knee 3 View Left    Result Date: 12/16/2023  Narrative: PROCEDURE: XR KNEE 3 VW LEFT  COMPARISON: [facility], [facility], KNEE 3 VIEWS LT, 5/05/2018, 15:40.  INDICATIONS: NO KNOWN INJURY COMPLAINS OF LEFT KNEE PAIN  FINDINGS:  No fractures are visualized.  Mild degenerative change consistent with osteoarthritis is evident.  Tiny bony densities seen on the AP and oblique views may represent osteochondral or loose bodies.  A small joint effusion is evident.      Impression:   Left knee series demonstrating mild degenerative change consistent with osteoarthritis.  Tiny bony densities may represent osteochondral or loose bodies.  Small joint effusion.      TELLY CANDELARIA MD       Electronically Signed and Approved By: TELLY CANDELARIA MD on 12/16/2023 at 11:32             XR Sacrum & Coccyx    Result Date: 11/20/2023  Narrative: PROCEDURE: XR SACRUM AND COCCYX  COMPARISON: None  INDICATIONS: TAILBONE PAIN X 6 MONTHS/NKI  FINDINGS:  No fracture or destructive bone lesion demonstrated.  Sacral neural foramina appear symmetric      Impression:  Negative     RUBIN EARLY MD       Electronically Signed and Approved By: RUBIN EARLY MD on 11/20/2023 at 17:00                     Assessment and Plan     Diagnoses and all orders for this visit:    1. Bodies, loose, joint, knee, left (Primary)        Discussed the treatment plan with the patient.  I reviewed the recent image studies. Plan for MRI of the left knee to evaluate the loose bodies. The patient expressed understanding and wished to proceed. Normal knee brace given today.    Call or return if worsening symptoms.    Follow Up     MRI results      Patient was given instructions and counseling regarding her condition or for health maintenance advice. Please see specific information pulled into the AVS if appropriate.     Scribed for Wilberto Camarillo MD by Gwendolyn Martinez.  12/19/23   14:32 EST    I have  personally performed the services described in this document as scribed by the above individual and it is both accurate and complete. Wilberto Camarillo MD 12/19/23

## 2024-01-08 ENCOUNTER — HOSPITAL ENCOUNTER (OUTPATIENT)
Dept: MRI IMAGING | Facility: HOSPITAL | Age: 64
Discharge: HOME | End: 2024-01-08
Admitting: ORTHOPAEDIC SURGERY
Payer: MEDICARE

## 2024-01-08 DIAGNOSIS — M23.42 BODIES, LOOSE, JOINT, KNEE, LEFT: ICD-10-CM

## 2024-01-08 PROCEDURE — 73721 MRI JNT OF LWR EXTRE W/O DYE: CPT

## 2024-01-11 ENCOUNTER — OFFICE VISIT (OUTPATIENT)
Dept: ORTHOPEDIC SURGERY | Facility: CLINIC | Age: 64
End: 2024-01-11
Payer: MEDICARE

## 2024-01-11 VITALS
HEART RATE: 86 BPM | BODY MASS INDEX: 28.66 KG/M2 | WEIGHT: 172 LBS | OXYGEN SATURATION: 97 % | SYSTOLIC BLOOD PRESSURE: 115 MMHG | HEIGHT: 65 IN | DIASTOLIC BLOOD PRESSURE: 76 MMHG

## 2024-01-11 DIAGNOSIS — M17.12 PRIMARY OSTEOARTHRITIS OF LEFT KNEE: Primary | ICD-10-CM

## 2024-01-11 DIAGNOSIS — M23.42 BODIES, LOOSE, JOINT, KNEE, LEFT: ICD-10-CM

## 2024-01-11 RX ORDER — LIDOCAINE HYDROCHLORIDE 10 MG/ML
5 INJECTION, SOLUTION INFILTRATION; PERINEURAL
Status: COMPLETED | OUTPATIENT
Start: 2024-01-11 | End: 2024-01-11

## 2024-01-11 RX ADMIN — LIDOCAINE HYDROCHLORIDE 5 ML: 10 INJECTION, SOLUTION INFILTRATION; PERINEURAL at 11:46

## 2024-01-11 NOTE — PROGRESS NOTES
"Chief Complaint  Follow-up of the Left Knee (mri)     Subjective      Oksana Smith presents to [facility] for follow up evaluation of the left knee. The patient recently had an MRI and is here today for those results. To review, She reports left knee pain. She denies injury or trauma. She reports she has been having bucking in her knee.    Allergies   Allergen Reactions    Clindamycin Hives, Itching and Nausea And Vomiting    Erythromycin Hives, Itching, GI Intolerance and Unknown - High Severity        Social History     Socioeconomic History    Marital status:    Tobacco Use    Smoking status: Never     Passive exposure: Never    Smokeless tobacco: Never   Vaping Use    Vaping Use: Never used   Substance and Sexual Activity    Alcohol use: Never    Drug use: Never    Sexual activity: Defer        I reviewed the patient's chief complaint, history of present illness, review of systems, past medical history, surgical history, family history, social history, medications, and allergy list.     Review of Systems     Constitutional: Denies fevers, chills, weight loss  Cardiovascular: Denies chest pain, shortness of breath  Skin: Denies rashes, acute skin changes  Neurologic: Denies headache, loss of consciousness  MSK: left knee pain      Vital Signs:   /76   Pulse 86   Ht 165.1 cm (65\")   Wt 78 kg (172 lb)   SpO2 97%   BMI 28.62 kg/m²          Physical Exam  General: Alert. No acute distress    Ortho Exam      Left knee- Positive EHL, FHL, GS and TA. Sensation intact to all 5 nerves of the foot. Positive pulses. Stable to varus/valgus stress. Stable to anterior/posterior drawer. Negative Lachman's. Negative Paula's. No effusion. Tender to the anterior and lateral knee. Full extension. Flexion 110 degrees. Negative Paula's.      Left knee: L knee  Date/Time: 1/11/2024 11:46 AM  Consent given by: patient  Site marked: site marked  Timeout: Immediately prior to " Appeal letter drafted and routed to PCP for signature.    procedure a time out was called to verify the correct patient, procedure, equipment, support staff and site/side marked as required   Supporting Documentation  Indications: pain   Procedure Details  Location: knee - L knee  Needle gauge: 21G.  Medications administered: 5 mL lidocaine 1 %; 32 mg Triamcinolone Acetonide 32 MG  Patient tolerance: patient tolerated the procedure well with no immediate complications            Imaging Results (Most Recent)       None             Result Review :       MRI Knee Left Without Contrast    Result Date: 1/9/2024  Narrative: PROCEDURE: MRI KNEE LEFT  WO CONTRAST  COMPARISON: [facility], CR, XR KNEE 3 VW LEFT, 12/16/2023, 11:13.  INDICATIONS: LEFT KNEE PAIN, WEAKNESS, AND GIVING OUT X 1 MONTH      TECHNIQUE: A complete multi-planar MRI was performed.   FINDINGS:  No evidence of fracture.  There is mild osteophyte formation.  Small foci of subchondral edema are seen at the inferior medial trochlea/anterior medial femoral condyle.  No other suspicious marrow signal abnormality.  There is a small joint effusion and a small Baker's cyst.  No definite evidence of rupture.  The quadriceps and patellar tendons appear intact.  Popliteus tendon and iliotibial band appear intact.  The cruciate and collateral ligaments appear intact.  The medial and lateral meniscus appear intact.  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  There appears to be full-thickness cartilage loss at the inferior-medial trochlea and anterior medial femoral condyle with mild subchondral edema.  There appears to be additional partial thickness cartilage loss of the weight-bearing medial tibiofemoral articular cartilage with mild fibrillation.  There is suspected mild chondromalacia of the lateral tibiofemoral and patellofemoral compartments without definite high-grade cartilage defect.      Impression:   1. Full-thickness cartilage loss at the inferior-medial trochlea and anterior medial femoral condyle  with mild subchondral edema.  Additional partial thickness cartilage loss of the medial tibiofemoral compartment. 2. Mild patellofemoral and lateral tibiofemoral chondromalacia without additional high-grade cartilage defect. 3. Small effusion and Baker's cyst. 4. No definite meniscal or ligamentous injury at this time.     DEL OSORIO MD       Electronically Signed and Approved By: DEL OSORIO MD on 1/09/2024 at 12:31             Duplex Venous Lower Extremity - Left CAR    Result Date: 12/17/2023  Narrative:   Normal left lower extremity venous duplex scan.     XR Knee 3 View Left    Result Date: 12/16/2023  Narrative: PROCEDURE: XR KNEE 3 VW LEFT  COMPARISON: [facility], , KNEE 3 VIEWS LT, 5/05/2018, 15:40.  INDICATIONS: NO KNOWN INJURY COMPLAINS OF LEFT KNEE PAIN  FINDINGS:  No fractures are visualized.  Mild degenerative change consistent with osteoarthritis is evident.  Tiny bony densities seen on the AP and oblique views may represent osteochondral or loose bodies.  A small joint effusion is evident.      Impression:   Left knee series demonstrating mild degenerative change consistent with osteoarthritis.  Tiny bony densities may represent osteochondral or loose bodies.  Small joint effusion.      TELLY CANDELARIA MD       Electronically Signed and Approved By: TELLY CANDELARIA MD on 12/16/2023 at 11:32                     Assessment and Plan     Diagnoses and all orders for this visit:    1. Primary osteoarthritis of left knee (Primary)    2. Bodies, loose, joint, knee, left        Discussed the treatment plan with the patient.  I reviewed the MRI with the patient. Discussed the risks and benefits of a left knee Zilretta injection. The patient expressed understanding and wished to proceed. She tolerated the injection well. Home exercises given today. Order for physical therapy given today.     Call or return if worsening symptoms.    Follow Up     6 weeks      Patient was given instructions and  counseling regarding her condition or for health maintenance advice. Please see specific information pulled into the AVS if appropriate.     Scribed for Wilberto Camarillo MD by Gwendolyn Martinez.  01/11/24   11:35 EST    I have personally performed the services described in this document as scribed by the above individual and it is both accurate and complete. Wilberto Camarillo MD 01/11/24

## 2024-01-24 ENCOUNTER — TREATMENT (OUTPATIENT)
Dept: PHYSICAL THERAPY | Facility: CLINIC | Age: 64
End: 2024-01-24
Payer: MEDICARE

## 2024-01-24 DIAGNOSIS — M62.81 MUSCLE WEAKNESS OF LOWER EXTREMITY: ICD-10-CM

## 2024-01-24 DIAGNOSIS — M25.562 LEFT KNEE PAIN, UNSPECIFIED CHRONICITY: Primary | ICD-10-CM

## 2024-01-24 NOTE — PROGRESS NOTES
Physical Therapy Initial Evaluation and Plan of Care      [facility] [address] [facility] [place] [address]      Patient: Oksana Smith   : 1960  Diagnosis/ICD-10 Code:  Left knee pain, unspecified chronicity [M25.562]  Referring practitioner: Wilberto Camarillo MD  Date of Initial Visit: 2024  Today's Date: 2024  Patient seen for 1 sessions           Subjective Questionnaire: LEFS: 56/80      Subjective   Pt reports to physical therapy w/ complaints of L knee pain. Pt reports they were on a long car ride, which resulted in a lot of pain. Pt was tested for a blood clot, though loose bodies were found. Pt reports they went to ortho and was offered and injection. Pt reports the injection helped, and they have no pain a this time. Pt reports they are able to bend and sit on their LE without pain.     Pt reports they are interested in receiving a HEP.       Pt occupation: retired civil service    Current Pain: 0/10  Past Medical Hx: past cancer (continue to follow 1x per year), Diaphragm L side paralysis, water pill ( R LE swelling at times), fracture of L hip (hairline) .       Objective          Active Range of Motion   Left Knee   Flexion: WFL  Extension: 0 degrees     Right Knee   Flexion: WFL  Extension: 0 degrees     Strength/Myotome Testing     Left Hip   Planes of Motion   Flexion: 4-  Extension: 4-  Abduction: 4-    Right Hip   Planes of Motion   Flexion: 4  Extension: 4-  Abduction: 4-    Left Knee   Flexion: 4  Extension: 4+    Right Knee   Flexion: 4  Extension: 4+    Ambulation     Comments   Pt has no noticeable limp at this time. Pt has no brace and no AD.     Functional Assessment     Comments  TU.62 sec  30 sec STS: 7; limited due to lung capacity       See Exercise, Manual, and Modality Logs for complete treatment.       Assessment & Plan       Assessment  Impairments: abnormal or restricted ROM, activity intolerance, impaired balance, impaired physical  strength and lacks appropriate home exercise program   Functional limitations: lifting, stooping and unable to perform repetitive tasks   Assessment details: Pt reports to physical therapy w/ complaints of past L knee pain. Pt has not had pain since their injection. Pt presents w/ decreased strength and decreased tolerance to exs. Pt has minimal deficits described by LEFS. Pt was educated on their HEP. Pt will benefit from skilled physical therapy, to address their current impairments and limitations, in order to improve upon their functional mobility and gait for improvement in performance of ADLs and daily tasks.  Prognosis: good    Goals  Plan Goals: KNEE PROBLEMS:     1. The patient demonstrates weakness of the B hip.   LTG 1: 12 weeks:  The patient will demonstrate 4+/5 strength for B hip flexion, abduction,  and extension in order to improve hip stability.    STATUS:  New   STG 1a: 6 weeks:  The patient will demonstrate 4/5 strength for B hip flexion, abduction,  and extension.    STATUS:  New   TREATMENT: Therapeutic exercises, manual therapy, aquatic therapy, home exercise instruction,  and modalities as needed for pain to include:  electrical stimulation, moist heat, ice, and ultrasound    2. The patient has limited strength of the L knee.   LTG 2: 2 weeks: The patient will demonstrate 5 /5 strength for L knee flexion and extension in order to allow patient improved joint stability    STATUS:  New   TREATMENT: Manual therapy, therapeutic exercise, home exercise instruction, aquatic therapy, and modalities as needed to include:  moist heat, electrical stimulation, ultrasound, and ice.    3. Mobility: Walking/Moving Around Functional Limitation     LTG 3: 12 weeks:  The patient will demonstrate 25 % limitation by achieving a score of 60/80 on the LEFS.    STATUS:  New   TREATMENT:  Manual therapy, therapeutic exercise, home exercise instruction, and modalities as needed to include: moist heat, electrical  stimulation, and ultrasound.           PLAN:  Therapy options: will receive skilled therapy services  Planned modality interventions: Cryotherapy  Planned therapy interventions:balance/weight-bearing training, ADL retraining, soft tissue mobilization, strengthening, stretching, therapeutic activities, manual therapy, joint mobilization, home exercise program/patient education, gait training, functional ROM exercises, flexibility, body mechanics training, postural training, and neuromuscular re-education  Frequency: 2x per week  Duration in weeks: 12  Treatment plan discussed with: patient      Visit Diagnoses:    ICD-10-CM ICD-9-CM   1. Left knee pain, unspecified chronicity  M25.562 719.46   2. Muscle weakness of lower extremity  M62.81 728.87       History # of Personal Factors and/or Comorbidities: MODERATE (1-2)  Examination of Body System(s): # of elements: LOW (1-2)  Clinical Presentation: STABLE   Clinical Decision Making: LOW       Timed:         Manual Therapy:    0     mins  80563;     Therapeutic Exercise:    30     mins  40650;     Neuromuscular Kristie:    0    mins  60275;    Therapeutic Activity:     0     mins  33585;     Gait Trainin     mins  56161;     Ultrasound:     0     mins  47171;    Ionto                               0    mins   35692  Self Care                       0     mins   91841  Canalith Repos    0     mins 79588      Un-Timed:  Electrical Stimulation:    0     mins  02566 ( );  Dry Needling     0     mins self-pay  Traction     0     mins 58684  Low Eval     15     Mins  38990  Mod Eval     0     Mins  10940  High Eval                       0     Mins  13995  Re-Eval                           0    mins  70977    Timed Treatment:   30   mins   Total Treatment:     45   mins    PT SIGNATURE: Leoncio Castro PT, DPT    Electronically signed 2024    [place] License: PT - [id number]    Initial Certification  Certification Period: 2024 thru 2024  I certify that the  therapy services are furnished while this patient is under my care.  The services outlined above are required by this patient, and will be reviewed every 90 days.     PHYSICIAN: Wilberto Camarillo MD  NPI: [id number]      DATE:     Please sign and return via fax to [phone]. Thank you, [facility].

## 2024-01-25 ENCOUNTER — OFFICE VISIT (OUTPATIENT)
Dept: INTERNAL MEDICINE | Facility: CLINIC | Age: 64
End: 2024-01-25
Payer: MEDICARE

## 2024-01-25 VITALS
DIASTOLIC BLOOD PRESSURE: 70 MMHG | OXYGEN SATURATION: 97 % | HEART RATE: 80 BPM | HEIGHT: 65 IN | RESPIRATION RATE: 20 BRPM | TEMPERATURE: 97.7 F | SYSTOLIC BLOOD PRESSURE: 118 MMHG | BODY MASS INDEX: 27.39 KG/M2 | WEIGHT: 164.38 LBS

## 2024-01-25 DIAGNOSIS — B37.2 YEAST INFECTION OF THE SKIN: ICD-10-CM

## 2024-01-25 DIAGNOSIS — N39.0 URINARY TRACT INFECTION WITHOUT HEMATURIA, SITE UNSPECIFIED: Primary | ICD-10-CM

## 2024-01-25 LAB
BACTERIA UR QL AUTO: ABNORMAL /HPF
BILIRUB UR QL STRIP: NEGATIVE
CLARITY UR: CLEAR
COLOR UR: YELLOW
GLUCOSE UR STRIP-MCNC: NEGATIVE MG/DL
HGB UR QL STRIP.AUTO: NEGATIVE
HYALINE CASTS UR QL AUTO: ABNORMAL /LPF
KETONES UR QL STRIP: NEGATIVE
LEUKOCYTE ESTERASE UR QL STRIP.AUTO: ABNORMAL
NITRITE UR QL STRIP: NEGATIVE
PH UR STRIP.AUTO: 7.5 [PH] (ref 5–8)
PROT UR QL STRIP: NEGATIVE
RBC # UR STRIP: ABNORMAL /HPF
REF LAB TEST METHOD: ABNORMAL
SP GR UR STRIP: 1.02 (ref 1–1.03)
SQUAMOUS #/AREA URNS HPF: ABNORMAL /HPF
UROBILINOGEN UR QL STRIP: ABNORMAL
WBC # UR STRIP: ABNORMAL /HPF

## 2024-01-25 PROCEDURE — 99213 OFFICE O/P EST LOW 20 MIN: CPT | Performed by: INTERNAL MEDICINE

## 2024-01-25 PROCEDURE — 3078F DIAST BP <80 MM HG: CPT | Performed by: INTERNAL MEDICINE

## 2024-01-25 PROCEDURE — 3074F SYST BP LT 130 MM HG: CPT | Performed by: INTERNAL MEDICINE

## 2024-01-25 PROCEDURE — 81001 URINALYSIS AUTO W/SCOPE: CPT | Performed by: INTERNAL MEDICINE

## 2024-01-25 PROCEDURE — 87086 URINE CULTURE/COLONY COUNT: CPT | Performed by: INTERNAL MEDICINE

## 2024-01-25 RX ORDER — NYSTATIN 100000 [USP'U]/G
POWDER TOPICAL 4 TIMES DAILY
Status: CANCELLED | OUTPATIENT
Start: 2024-01-25

## 2024-01-25 RX ORDER — NYSTATIN 100000 [USP'U]/G
POWDER TOPICAL 4 TIMES DAILY
COMMUNITY
End: 2024-01-25 | Stop reason: SDUPTHER

## 2024-01-25 RX ORDER — NYSTATIN 100000 [USP'U]/G
POWDER TOPICAL 4 TIMES DAILY
Qty: 60 G | Refills: 2 | Status: SHIPPED | OUTPATIENT
Start: 2024-01-25

## 2024-01-25 RX ORDER — FLUCONAZOLE 150 MG/1
150 TABLET ORAL
Qty: 2 TABLET | Refills: 1 | Status: SHIPPED | OUTPATIENT
Start: 2024-01-25

## 2024-01-25 NOTE — PROGRESS NOTES
"Chief Complaint  GI Problem (Has a yeast infection under stomach and is needing medications sent in for it ) and Urinary Tract Infection (Had a uti a few weeks ago and wants to make sure it is cleared up )    Subjective       Oksana Smith presents to [facility]    GI Problem    Urinary Tract Infection        Presenting for treatment of yeast skin rash on abdomen. Has been using topical nystatin cream and powder with some improvement.    Also following up after completing abx for UTI.    Objective     Vitals:    01/25/24 1136   BP: 118/70   BP Location: Left arm   Patient Position: Sitting   Cuff Size: Adult   Pulse: 80   Resp: 20   Temp: 97.7 °F (36.5 °C)   TempSrc: Temporal   SpO2: 97%   Weight: 74.6 kg (164 lb 6 oz)   Height: 165.1 cm (65\")      Wt Readings from Last 3 Encounters:   01/25/24 74.6 kg (164 lb 6 oz)   01/11/24 78 kg (172 lb)   01/08/24 78 kg (172 lb)      BP Readings from Last 3 Encounters:   01/25/24 118/70   01/11/24 115/76   12/19/23 133/75        Body mass index is 27.35 kg/m².    Physical Exam  Vitals reviewed.   Constitutional:       Appearance: Normal appearance. She is well-developed.   HENT:      Head: Normocephalic and atraumatic.      Mouth/Throat:      Pharynx: No oropharyngeal exudate.   Eyes:      Conjunctiva/sclera: Conjunctivae normal.      Pupils: Pupils are equal, round, and reactive to light.   Neck:      Thyroid: No thyromegaly or thyroid tenderness.   Cardiovascular:      Rate and Rhythm: Normal rate and regular rhythm.      Heart sounds: No murmur heard.     No friction rub. No gallop.   Pulmonary:      Effort: Pulmonary effort is normal.      Breath sounds: Normal breath sounds. No wheezing or rhonchi.   Lymphadenopathy:      Cervical: No cervical adenopathy.   Skin:     General: Skin is warm and dry.      Findings: Rash (candidal intertrigo under skin folds of abdomen) present.   Neurological:      Mental Status: She is " alert and oriented to person, place, and time.   Psychiatric:         Mood and Affect: Affect normal.          Result Review :   The following data was reviewed by: Portia Srena MD on 01/25/2024:        Procedures  Assessment & Plan  Urinary tract infection without hematuria, site unspecified  Has completed abx treatment. UA with trace LE in clinic today. Will send urine for culture.   Yeast infection of the skin  Continue topical therapies. Will also prescribe course of Diflucan.     Orders Placed This Encounter   Procedures    Urine Culture - Urine, Urine, Clean Catch    Urinalysis With Culture If Indicated - Urine, Clean Catch    Urinalysis, Microscopic Only - Urine, Clean Catch     New Medications Ordered This Visit   Medications    nystatin (MYCOSTATIN) 348162 UNIT/GM powder     Sig: Apply  topically to the appropriate area as directed 4 (Four) Times a Day.     Dispense:  60 g     Refill:  2    fluconazole (Diflucan) 150 MG tablet     Sig: Take 1 tablet by mouth Every 3 (Three) Days.     Dispense:  2 tablet     Refill:  1         Follow Up   Return for As needed.  Patient was given instructions and counseling regarding her condition or for health maintenance advice. Please see specific information pulled into the AVS if appropriate.

## 2024-01-26 LAB — BACTERIA SPEC AEROBE CULT: NO GROWTH

## 2024-02-02 ENCOUNTER — TELEPHONE (OUTPATIENT)
Dept: GASTROENTEROLOGY | Facility: CLINIC | Age: 64
End: 2024-02-02
Payer: MEDICARE

## 2024-02-02 RX ORDER — POLYETHYLENE GLYCOL 3350, SODIUM SULFATE, SODIUM CHLORIDE, POTASSIUM CHLORIDE, ASCORBIC ACID, SODIUM ASCORBATE 140-9-5.2G
1 KIT ORAL TAKE AS DIRECTED
Qty: 1 EACH | Refills: 0 | Status: SHIPPED | OUTPATIENT
Start: 2024-02-02 | End: 2024-02-03

## 2024-02-02 NOTE — TELEPHONE ENCOUNTER
Pt called, states she would like Plenvu sent to [facility] instead of [facility]. Please advise

## 2024-02-16 ENCOUNTER — ANESTHESIA EVENT (OUTPATIENT)
Dept: GASTROENTEROLOGY | Facility: HOSPITAL | Age: 64
End: 2024-02-16
Payer: MEDICARE

## 2024-02-19 ENCOUNTER — ANESTHESIA (OUTPATIENT)
Dept: GASTROENTEROLOGY | Facility: HOSPITAL | Age: 64
End: 2024-02-19
Payer: MEDICARE

## 2024-02-20 ENCOUNTER — TELEPHONE (OUTPATIENT)
Dept: GASTROENTEROLOGY | Facility: CLINIC | Age: 64
End: 2024-02-20
Payer: MEDICARE

## 2024-02-20 DIAGNOSIS — Z12.11 COLON CANCER SCREENING: Primary | ICD-10-CM

## 2024-02-21 ENCOUNTER — TREATMENT (OUTPATIENT)
Dept: PHYSICAL THERAPY | Facility: CLINIC | Age: 64
End: 2024-02-21
Payer: MEDICARE

## 2024-02-21 DIAGNOSIS — M25.562 LEFT KNEE PAIN, UNSPECIFIED CHRONICITY: Primary | ICD-10-CM

## 2024-02-21 DIAGNOSIS — M62.81 MUSCLE WEAKNESS OF LOWER EXTREMITY: ICD-10-CM

## 2024-02-21 NOTE — PROGRESS NOTES
Physical Therapy Daily Treatment Note/Discharge Summary  [facility] [facility]: [address] [place] [address]      Patient: Oksana Smith   : 1960  Diagnosis/ICD-10 Code:  Left knee pain, unspecified chronicity [M25.562]  Referring practitioner: Wilberto Camarillo MD  Date of Initial Visit: Type: THERAPY  Noted: 2024  Today's Date: 2024  Patient seen for 2 sessions           Subjective   The patient reported their L knee has no pain at this time. Pt reports their HEP is going well. Pt feels comfortable w/ discharging w/ their HEP today.     Objective   See Exercise, Manual, and Modality Logs for complete treatment.     Assessment/Plan  Pt tolerated therapy session well today, without complaints of increased pain. Pt understands exs performance and intensity for HEP. Pt was provided tband and a printout to facilitate their HEP. Pt's goals will be addressed at this time. Pt will be discharged w/ their HEP.     Goals  Plan Goals: KNEE PROBLEMS:      1. The patient demonstrates weakness of the B hip.              LTG 1: 12 weeks:  The patient will demonstrate 4+/5 strength for B hip flexion, abduction,  and extension in order to improve hip stability.                          STATUS:  Not met              STG 1a: 6 weeks:  The patient will demonstrate 4/5 strength for B hip flexion, abduction,  and extension.                          STATUS:  sufficiently met              TREATMENT: Therapeutic exercises, manual therapy, aquatic therapy, home exercise instruction,  and modalities as needed for pain to include:  electrical stimulation, moist heat, ice, and ultrasound     2. The patient has limited strength of the L knee.              LTG 2: 2 weeks: The patient will demonstrate 5 /5 strength for L knee flexion and extension in order to allow patient improved joint stability                          STATUS:  sufficiently met              TREATMENT: Manual therapy, therapeutic exercise,  home exercise instruction, aquatic therapy, and modalities as needed to include:  moist heat, electrical stimulation, ultrasound, and ice.     3. Mobility: Walking/Moving Around Functional Limitation                               LTG 3: 12 weeks:  The patient will demonstrate 25 % limitation by achieving a score of 60/80 on the LEFS.                          STATUS:  Not met              TREATMENT:  Manual therapy, therapeutic exercise, home exercise instruction, and modalities as needed to include: moist heat, electrical stimulation, and ultrasound.        Timed:  Manual Therapy:    0     mins  35707;  Therapeutic Exercise:    25     mins  55973;     Neuromuscular Kristie:   0    mins  87960;    Therapeutic Activity:     15     mins  00923;     Gait Trainin     mins  43919;     Aquatics                         0      mins  10392    Un-timed:  Mechanical Traction      0     mins  46416  Electrical Stimulation:    0     mins  45825 ( );      Timed Treatment:   40   mins   Total Treatment:     40   mins    Leoncio Castro PT, DPT    Electronically signed 2024    [place] License: PT - [id number]

## 2024-02-21 NOTE — TELEPHONE ENCOUNTER
Patient presented to the office. I was able to reschedule her colonoscopy. Patient states she was not able to tolerate Plenvu, and cannot take golytley either. Patient is requesting Sutab. I discussed with patient that this would need approval from the provider. Please advise if patient can have Sutab. If so, please send to [facility] per pt request. I advised patient we would need to send her instructions via [employer] once approved.

## 2024-02-22 NOTE — TELEPHONE ENCOUNTER
Can we please inquire if patient has any history of seizures, cardiac problems, or kidney dysfunction. If not okay to proceed with Sutab. Please emphasize the importance of hydration when using this prep

## 2024-02-23 RX ORDER — SOD SULF/POT CHLORIDE/MAG SULF 1.479 G
12 TABLET ORAL TAKE AS DIRECTED
Qty: 24 TABLET | Refills: 0 | Status: SHIPPED | OUTPATIENT
Start: 2024-02-23

## 2024-02-23 NOTE — TELEPHONE ENCOUNTER
No history of seizures, cardiac problems, or kidney dysfunction.     Bowel prep for sutab sent to patients pharmacy. Instructions provided to patient

## 2024-03-08 ENCOUNTER — OFFICE VISIT (OUTPATIENT)
Dept: ORTHOPEDIC SURGERY | Facility: CLINIC | Age: 64
End: 2024-03-08
Payer: MEDICARE

## 2024-03-08 VITALS
HEART RATE: 74 BPM | BODY MASS INDEX: 27.4 KG/M2 | OXYGEN SATURATION: 96 % | DIASTOLIC BLOOD PRESSURE: 68 MMHG | WEIGHT: 164.46 LBS | SYSTOLIC BLOOD PRESSURE: 103 MMHG | HEIGHT: 65 IN

## 2024-03-08 DIAGNOSIS — M17.12 PRIMARY OSTEOARTHRITIS OF LEFT KNEE: Primary | ICD-10-CM

## 2024-03-08 DIAGNOSIS — M23.42 BODIES, LOOSE, JOINT, KNEE, LEFT: ICD-10-CM

## 2024-03-08 NOTE — PROGRESS NOTES
Chief Complaint  Follow-up of the Left Knee    Subjective          History of Present Illness      Oksana Smith is a 63 y.o. female  presents to [facility] for     Patient presents for follow-up evaluation of left knee pain, left knee osteoarthritis and loose bodies.  Dr. Camarillo evaluated her at last visit with an review of her MRI, gave her a steroid injection advised her to go to physical therapy.  She states the injection helped after 2 days she states the knee has had no pain since then.  She only had to do 2 visits of therapy due to her progress and no pain they released her to home exercise program.  She states she has good range of motion and strength she states occasionally she feels pain and/or a buckling sensation but denies buckling she denies swelling if she has pain its in the medial and posterior knee she denies pain today.  She denies need for further treatment.      Allergies   Allergen Reactions    Clindamycin Hives, Itching and Nausea And Vomiting    Erythromycin Hives, Itching, GI Intolerance and Unknown - High Severity        Social History     Socioeconomic History    Marital status:    Tobacco Use    Smoking status: Never     Passive exposure: Never    Smokeless tobacco: Never   Vaping Use    Vaping status: Never Used   Substance and Sexual Activity    Alcohol use: Never    Drug use: Never    Sexual activity: Defer        REVIEW OF SYSTEMS    Constitutional: Awake alert and oriented x3, no acute distress, denies fevers, chills, weight loss  Respiratory: No respiratory distress  Vascular: Brisk cap refill, Intact distal pulses, No cyanosis, compartments soft with no signs or symptoms of compartment syndrome or DVT.   Cardiovascular: Denies chest pain, shortness of breath  Skin: Denies rashes, acute skin changes  Neurologic: Denies headache, loss of consciousness  MSK: Left knee pain      Objective   Vital Signs:   /68   Pulse 74   Ht 165.1  "cm (65\")   Wt 74.6 kg (164 lb 7.4 oz)   SpO2 96%   BMI 27.37 kg/m²     Body mass index is 27.37 kg/m².    Physical Exam       Left knee: Skin is intact, no erythema, no ecchymosis, no swelling, no effusion, no signs of infection, full extension, flexion 120, stable anterior/posterior drawer, stable to varus/valgus stress.  Nontender to palpation, no pain with range of motion. Negative Paula, Negative Lachman.      Procedures    Imaging Results (Most Recent)       None             Result Review :   The following data was reviewed by: BRYAN Pickett on 03/08/2024:               Assessment and Plan    Diagnoses and all orders for this visit:    1. Primary osteoarthritis of left knee (Primary)    2. Bodies, loose, joint, knee, left        Discussed diagnosis and treatment options with the patient and her , patient was advised recommend continued conservative treatment she would like to follow-up in 6 weeks for possible injection.    Call or return if worsening symptoms.    Follow Up   Return in about 6 weeks (around 4/19/2024).  Patient was given instructions and counseling regarding her condition or for health maintenance advice. Please see specific information pulled into the AVS if appropriate.       EMR Dragon/Transcription disclaimer:  Much of this encounter note is an electronic transcription/translation of spoken language to printed text, aka voice recognition.  The electronic translation of spoken language may permit erroneous or at times nonsensical words or phrases to be inadvertently transcribed; although I have reviewed the note for such errors, some may still exist so please interpret based on surrounding text content.  "

## 2024-03-14 ENCOUNTER — TELEPHONE (OUTPATIENT)
Dept: INTERNAL MEDICINE | Facility: CLINIC | Age: 64
End: 2024-03-14

## 2024-03-14 NOTE — TELEPHONE ENCOUNTER
Caller: ROSA FROM [facility]    Relationship: Other    Best call back number: [phone]     What is the medical concern/diagnosis: BREAST CARE FOR FAMILY HISTORY OF BREAST CANCER    What is the provider, practice or medical service name: KARLA MARIE [id number], [facility]    What is the office location: [address] [place] [address]     What is the office phone number: [phone]    Any additional details: CALLER STATES PATIENT HAS  REFERRAL FOR TREATMENT. PATIENT HAS UPCOMING APPOINTMENT ON 2024 AND NEEDS A NEW  REFERRAL. CALLER STATES THAT REFERRAL IS FOR EVALUATION AND TREATMENT FOR 25 VISITS FOR A YEAR. CALLER PROVIDED DIAGNOSTIC CODES OF M16.99 AND Z80.3

## 2024-05-05 NOTE — TELEPHONE ENCOUNTER
Called  Patient and meds were correct and sent to [facility]. Patient stated understanding. No other issues or concerns noted currently per patient.  
Caller: Oksana Smith    Relationship: Self    Best call back number: [phone]    What is the best time to reach you: ANYTIME    Who are you requesting to speak with (clinical staff, provider,  specific staff member): NURSE OR DR. SHAY     What was the call regarding: PATIENT HAS A MIXUP IN HER BLOOD PRESSURE MEDICATION AND ALSO THE LOTION DOCTOR WAS GOING TO PRESCRIPT.     Do you require a callback: YES           
Caller: Oksana Smith    Relationship: Self    Best call back number: [phone]     Medication needed:   Requested Prescriptions      No prescriptions requested or ordered in this encounter     triamterene-hydrochlorothiazid 37.5-25 mg oral tablet  take 0.5 tablet by oral route daily as needed for 90 days    ammonium lactate 12 % topical lotion  apply to affected area by topical route 2 times a day as needed    When do you need the refill by: ASAP    Does the patient have less than a 3 day supply:  [] Yes  [x] No    What is the patient's preferred pharmacy: [facility] [place], [place] - [address] [phone] [facility] [phone] FX     PATIENT REQUESTS CALLBACK WHEN PRESCRIPTIONS HAVE BEEN SENT, CAN LEAVE MESSAGE IF NO ANSWER            
9

## 2024-05-08 ENCOUNTER — CLINICAL SUPPORT (OUTPATIENT)
Dept: INTERNAL MEDICINE | Facility: CLINIC | Age: 64
End: 2024-05-08
Payer: MEDICARE

## 2024-05-08 DIAGNOSIS — I10 PRIMARY HYPERTENSION: ICD-10-CM

## 2024-05-08 DIAGNOSIS — E03.9 HYPOTHYROIDISM, UNSPECIFIED TYPE: ICD-10-CM

## 2024-05-08 DIAGNOSIS — E53.8 VITAMIN B12 DEFICIENCY: Primary | ICD-10-CM

## 2024-05-08 DIAGNOSIS — E55.9 VITAMIN D DEFICIENCY: ICD-10-CM

## 2024-05-08 PROCEDURE — 80061 LIPID PANEL: CPT | Performed by: INTERNAL MEDICINE

## 2024-05-08 PROCEDURE — 85025 COMPLETE CBC W/AUTO DIFF WBC: CPT | Performed by: INTERNAL MEDICINE

## 2024-05-08 PROCEDURE — 82306 VITAMIN D 25 HYDROXY: CPT | Performed by: INTERNAL MEDICINE

## 2024-05-08 PROCEDURE — 80053 COMPREHEN METABOLIC PANEL: CPT | Performed by: INTERNAL MEDICINE

## 2024-05-08 PROCEDURE — 36415 COLL VENOUS BLD VENIPUNCTURE: CPT | Performed by: INTERNAL MEDICINE

## 2024-05-08 PROCEDURE — 84443 ASSAY THYROID STIM HORMONE: CPT | Performed by: INTERNAL MEDICINE

## 2024-05-09 LAB
25(OH)D3 SERPL-MCNC: 27.4 NG/ML (ref 30–100)
ALBUMIN SERPL-MCNC: 4.1 G/DL (ref 3.5–5.2)
ALBUMIN/GLOB SERPL: 1.5 G/DL
ALP SERPL-CCNC: 63 U/L (ref 39–117)
ALT SERPL W P-5'-P-CCNC: 6 U/L (ref 1–33)
ANION GAP SERPL CALCULATED.3IONS-SCNC: 8.8 MMOL/L (ref 5–15)
AST SERPL-CCNC: 15 U/L (ref 1–32)
BASOPHILS # BLD AUTO: 0.07 10*3/MM3 (ref 0–0.2)
BASOPHILS NFR BLD AUTO: 1.1 % (ref 0–1.5)
BILIRUB SERPL-MCNC: 0.5 MG/DL (ref 0–1.2)
BUN SERPL-MCNC: 14 MG/DL (ref 8–23)
BUN/CREAT SERPL: 15.1 (ref 7–25)
CALCIUM SPEC-SCNC: 9.4 MG/DL (ref 8.6–10.5)
CHLORIDE SERPL-SCNC: 101 MMOL/L (ref 98–107)
CHOLEST SERPL-MCNC: 234 MG/DL (ref 0–200)
CO2 SERPL-SCNC: 29.2 MMOL/L (ref 22–29)
CREAT SERPL-MCNC: 0.93 MG/DL (ref 0.57–1)
DEPRECATED RDW RBC AUTO: 40.4 FL (ref 37–54)
EGFRCR SERPLBLD CKD-EPI 2021: 70.1 ML/MIN/1.73
EOSINOPHIL # BLD AUTO: 0.25 10*3/MM3 (ref 0–0.4)
EOSINOPHIL NFR BLD AUTO: 3.9 % (ref 0.3–6.2)
ERYTHROCYTE [DISTWIDTH] IN BLOOD BY AUTOMATED COUNT: 12.2 % (ref 12.3–15.4)
GLOBULIN UR ELPH-MCNC: 2.7 GM/DL
GLUCOSE SERPL-MCNC: 89 MG/DL (ref 65–99)
HCT VFR BLD AUTO: 42.7 % (ref 34–46.6)
HDLC SERPL-MCNC: 43 MG/DL (ref 40–60)
HGB BLD-MCNC: 14.3 G/DL (ref 12–15.9)
IMM GRANULOCYTES # BLD AUTO: 0.02 10*3/MM3 (ref 0–0.05)
IMM GRANULOCYTES NFR BLD AUTO: 0.3 % (ref 0–0.5)
LDLC SERPL CALC-MCNC: 150 MG/DL (ref 0–100)
LDLC/HDLC SERPL: 3.4 {RATIO}
LYMPHOCYTES # BLD AUTO: 1.83 10*3/MM3 (ref 0.7–3.1)
LYMPHOCYTES NFR BLD AUTO: 28.2 % (ref 19.6–45.3)
MCH RBC QN AUTO: 30.6 PG (ref 26.6–33)
MCHC RBC AUTO-ENTMCNC: 33.5 G/DL (ref 31.5–35.7)
MCV RBC AUTO: 91.2 FL (ref 79–97)
MONOCYTES # BLD AUTO: 0.46 10*3/MM3 (ref 0.1–0.9)
MONOCYTES NFR BLD AUTO: 7.1 % (ref 5–12)
NEUTROPHILS NFR BLD AUTO: 3.85 10*3/MM3 (ref 1.7–7)
NEUTROPHILS NFR BLD AUTO: 59.4 % (ref 42.7–76)
NRBC BLD AUTO-RTO: 0 /100 WBC (ref 0–0.2)
PLATELET # BLD AUTO: 353 10*3/MM3 (ref 140–450)
PMV BLD AUTO: 10.2 FL (ref 6–12)
POTASSIUM SERPL-SCNC: 3.6 MMOL/L (ref 3.5–5.2)
PROT SERPL-MCNC: 6.8 G/DL (ref 6–8.5)
RBC # BLD AUTO: 4.68 10*6/MM3 (ref 3.77–5.28)
SODIUM SERPL-SCNC: 139 MMOL/L (ref 136–145)
TRIGL SERPL-MCNC: 224 MG/DL (ref 0–150)
TSH SERPL DL<=0.05 MIU/L-ACNC: 1.86 UIU/ML (ref 0.27–4.2)
VLDLC SERPL-MCNC: 41 MG/DL (ref 5–40)
WBC NRBC COR # BLD AUTO: 6.48 10*3/MM3 (ref 3.4–10.8)

## 2024-05-10 ENCOUNTER — HOSPITAL ENCOUNTER (OUTPATIENT)
Facility: HOSPITAL | Age: 64
Setting detail: HOSPITAL OUTPATIENT SURGERY
Discharge: HOME | End: 2024-05-10
Attending: INTERNAL MEDICINE | Admitting: INTERNAL MEDICINE
Payer: MEDICARE

## 2024-05-10 VITALS
DIASTOLIC BLOOD PRESSURE: 70 MMHG | BODY MASS INDEX: 26.89 KG/M2 | HEIGHT: 65 IN | RESPIRATION RATE: 15 BRPM | WEIGHT: 161.38 LBS | OXYGEN SATURATION: 97 % | HEART RATE: 87 BPM | SYSTOLIC BLOOD PRESSURE: 112 MMHG | TEMPERATURE: 98 F

## 2024-05-10 DIAGNOSIS — Z86.010 HISTORY OF COLON POLYPS: ICD-10-CM

## 2024-05-10 PROCEDURE — 25810000003 LACTATED RINGERS PER 1000 ML

## 2024-05-10 PROCEDURE — 25010000002 PROPOFOL 10 MG/ML EMULSION

## 2024-05-10 PROCEDURE — 45385 COLONOSCOPY W/LESION REMOVAL: CPT | Performed by: INTERNAL MEDICINE

## 2024-05-10 PROCEDURE — 25010000002 ONDANSETRON PER 1 MG

## 2024-05-10 PROCEDURE — 88305 TISSUE EXAM BY PATHOLOGIST: CPT | Performed by: INTERNAL MEDICINE

## 2024-05-10 RX ORDER — EPHEDRINE SULFATE 50 MG/ML
INJECTION INTRAVENOUS AS NEEDED
Status: DISCONTINUED | OUTPATIENT
Start: 2024-05-10 | End: 2024-05-10 | Stop reason: SURG

## 2024-05-10 RX ORDER — PROPOFOL 10 MG/ML
VIAL (ML) INTRAVENOUS AS NEEDED
Status: DISCONTINUED | OUTPATIENT
Start: 2024-05-10 | End: 2024-05-10 | Stop reason: SURG

## 2024-05-10 RX ORDER — ONDANSETRON 2 MG/ML
4 INJECTION INTRAMUSCULAR; INTRAVENOUS ONCE
Status: COMPLETED | OUTPATIENT
Start: 2024-05-10 | End: 2024-05-10

## 2024-05-10 RX ORDER — SODIUM CHLORIDE, SODIUM LACTATE, POTASSIUM CHLORIDE, CALCIUM CHLORIDE 600; 310; 30; 20 MG/100ML; MG/100ML; MG/100ML; MG/100ML
30 INJECTION, SOLUTION INTRAVENOUS CONTINUOUS
Status: DISCONTINUED | OUTPATIENT
Start: 2024-05-10 | End: 2024-05-10 | Stop reason: HOSPADM

## 2024-05-10 RX ADMIN — PROPOFOL 80 MG: 10 INJECTION, EMULSION INTRAVENOUS at 09:40

## 2024-05-10 RX ADMIN — ONDANSETRON 4 MG: 2 INJECTION INTRAMUSCULAR; INTRAVENOUS at 09:19

## 2024-05-10 RX ADMIN — EPHEDRINE SULFATE 10 MG: 50 INJECTION INTRAVENOUS at 09:47

## 2024-05-10 RX ADMIN — SODIUM CHLORIDE, POTASSIUM CHLORIDE, SODIUM LACTATE AND CALCIUM CHLORIDE 30 ML/HR: 600; 310; 30; 20 INJECTION, SOLUTION INTRAVENOUS at 08:35

## 2024-05-10 RX ADMIN — PROPOFOL 175 MCG/KG/MIN: 10 INJECTION, EMULSION INTRAVENOUS at 09:40

## 2024-05-10 NOTE — H&P
Pre Procedure History & Physical    Chief Complaint:   Past history colon polyps    Subjective     HPI:   Surveillance for colon polyps    Past Medical History:   Past Medical History:   Diagnosis Date    Atypical ductal hyperplasia of right breast     Breast lump     Cancer     Chronic cough     Colon polyp     Constipation     Early satiety     Esophageal dysphagia     Fatigue     Hemidiaphragm paralysis     HTN (hypertension)     Hypothyroidism     Lateral epicondylitis of left elbow 03/14/2018    OM (otitis media)     Rectal bleeding     Right knee pain 08/30/2018    Shortness of breath     Tear of PCL (posterior cruciate ligament) of knee, right, initial encounter 08/30/2018    Thymoma, malignant     Thyroid disorder     Vitamin B12 deficiency     Vitamin D deficiency        Past Surgical History:  Past Surgical History:   Procedure Laterality Date    BREAST LUMPECTOMY Right     COLONOSCOPY      Dr. Roque    ENDOSCOPY  09/11/2018    Dr. Roque    HYSTERECTOMY  1994    TONSILLECTOMY  1967       Family History:  Family History   Problem Relation Age of Onset    Breast cancer Mother     Heart disease Mother     Cancer Mother     Arthritis Mother     Heart disease Father     Arthritis Father     Diabetes Sister     Colon cancer Neg Hx        Social History:   reports that she has never smoked. She has never been exposed to tobacco smoke. She has never used smokeless tobacco. She reports that she does not drink alcohol and does not use drugs.    Medications:   Medications Prior to Admission   Medication Sig Dispense Refill Last Dose    ammonium lactate (AmLactin) 12 % lotion Apply  topically to the appropriate area as directed As Needed for Dry Skin. 225 g 1     clotrimazole-betamethasone (Lotrisone) 1-0.05 % cream Apply 1 application  topically to the appropriate area as directed 2 (Two) Times a Day. 45 g 1     fluconazole (Diflucan) 150 MG tablet Take 1 tablet by mouth Every 3 (Three) Days. 2 tablet 1      levothyroxine (Synthroid) 50 MCG tablet Take 1 tablet by mouth Daily. 90 tablet 1     nystatin (MYCOSTATIN) 982576 UNIT/GM powder Apply  topically to the appropriate area as directed 4 (Four) Times a Day. 60 g 2     polyethylene glycol (GoLYTELY) 236 g solution Take 4,000 mL by mouth Take As Directed. Take per office instructions. 4000 mL 0     Sodium Sulfate-Mag Sulfate-KCl (Sutab) 8594-460-130 MG tablet Take 12 tablets by mouth Take As Directed. Take 12 tablets at 6 pm and 12 tablets 4 hours prior to procedure. 24 tablet 0     triamterene-hydrochlorothiazide (MAXZIDE-25) 37.5-25 MG per tablet Take 0.5 tablets by mouth Daily. 45 tablet 1     vitamin D (ERGOCALCIFEROL) 1.25 MG (17408 UT) capsule capsule Take 1 capsule by mouth Every 7 (Seven) Days. 12 capsule 3        Allergies:  Clindamycin and Erythromycin        Objective     Weight 73.2 kg (161 lb 6 oz).    Physical Exam   Constitutional: Pt is oriented to person, place, and time and well-developed, well-nourished, and in no distress.   Mouth/Throat: Oropharynx is clear and moist.   Neck: Normal range of motion.   Cardiovascular: Normal rate, regular rhythm and normal heart sounds.    Pulmonary/Chest: Effort normal and breath sounds normal.   Abdominal: Soft. Nontender  Skin: Skin is warm and dry.   Psychiatric: Mood, memory, affect and judgment normal.     Assessment & Plan     Diagnosis:  Colon polyp surveillance    Anticipated Surgical Procedure:  Colonoscopy    The risks, benefits, and alternatives of this procedure have been discussed with the patient or the responsible party- the patient understands and agrees to proceed.

## 2024-05-13 LAB
CYTO UR: NORMAL
LAB AP CASE REPORT: NORMAL
LAB AP CLINICAL INFORMATION: NORMAL
PATH REPORT.FINAL DX SPEC: NORMAL
PATH REPORT.GROSS SPEC: NORMAL

## 2024-05-13 NOTE — PROGRESS NOTES
Colonoscopy 5/10/2024: Good prep, small polyp in the ascending colon removed  Repeat colonoscopy 5 years, send letter

## 2024-05-15 ENCOUNTER — OFFICE VISIT (OUTPATIENT)
Dept: INTERNAL MEDICINE | Facility: CLINIC | Age: 64
End: 2024-05-15
Payer: MEDICARE

## 2024-05-15 VITALS
RESPIRATION RATE: 20 BRPM | HEIGHT: 65 IN | OXYGEN SATURATION: 100 % | SYSTOLIC BLOOD PRESSURE: 120 MMHG | DIASTOLIC BLOOD PRESSURE: 68 MMHG | TEMPERATURE: 97.6 F | BODY MASS INDEX: 27.36 KG/M2 | HEART RATE: 98 BPM | WEIGHT: 164.25 LBS

## 2024-05-15 DIAGNOSIS — R79.9 ABNORMAL FINDING OF BLOOD CHEMISTRY, UNSPECIFIED: ICD-10-CM

## 2024-05-15 DIAGNOSIS — Z00.00 ENCOUNTER FOR SUBSEQUENT ANNUAL WELLNESS VISIT (AWV) IN MEDICARE PATIENT: Primary | ICD-10-CM

## 2024-05-15 DIAGNOSIS — E55.9 VITAMIN D DEFICIENCY: ICD-10-CM

## 2024-05-15 DIAGNOSIS — E03.9 HYPOTHYROIDISM, UNSPECIFIED TYPE: ICD-10-CM

## 2024-05-15 DIAGNOSIS — I10 PRIMARY HYPERTENSION: ICD-10-CM

## 2024-05-15 PROCEDURE — G0439 PPPS, SUBSEQ VISIT: HCPCS | Performed by: INTERNAL MEDICINE

## 2024-05-15 PROCEDURE — 3078F DIAST BP <80 MM HG: CPT | Performed by: INTERNAL MEDICINE

## 2024-05-15 PROCEDURE — 3074F SYST BP LT 130 MM HG: CPT | Performed by: INTERNAL MEDICINE

## 2024-05-15 RX ORDER — NYSTATIN 100000 [USP'U]/G
POWDER TOPICAL 4 TIMES DAILY
Qty: 60 G | Refills: 2 | Status: SHIPPED | OUTPATIENT
Start: 2024-05-15

## 2024-05-15 RX ORDER — ERGOCALCIFEROL 1.25 MG/1
50000 CAPSULE ORAL
Qty: 12 CAPSULE | Refills: 3 | Status: SHIPPED | OUTPATIENT
Start: 2024-05-15

## 2024-05-15 RX ORDER — FLUCONAZOLE 150 MG/1
150 TABLET ORAL
Qty: 2 TABLET | Refills: 1 | Status: SHIPPED | OUTPATIENT
Start: 2024-05-15

## 2024-05-15 RX ORDER — TRIAMTERENE AND HYDROCHLOROTHIAZIDE 37.5; 25 MG/1; MG/1
0.5 TABLET ORAL DAILY
Qty: 45 TABLET | Refills: 1 | Status: SHIPPED | OUTPATIENT
Start: 2024-05-15

## 2024-05-15 RX ORDER — AMMONIUM LACTATE 12 G/100G
LOTION TOPICAL AS NEEDED
Qty: 225 G | Refills: 1 | Status: SHIPPED | OUTPATIENT
Start: 2024-05-15

## 2024-05-15 RX ORDER — CLOTRIMAZOLE AND BETAMETHASONE DIPROPIONATE 10; .64 MG/G; MG/G
1 CREAM TOPICAL 2 TIMES DAILY
Qty: 45 G | Refills: 1 | Status: SHIPPED | OUTPATIENT
Start: 2024-05-15

## 2024-05-15 RX ORDER — LEVOTHYROXINE SODIUM 0.05 MG/1
50 TABLET ORAL DAILY
Qty: 90 TABLET | Refills: 1 | Status: SHIPPED | OUTPATIENT
Start: 2024-05-15

## 2024-06-03 NOTE — PROGRESS NOTES
The ABCs of the Annual Wellness Visit  Subsequent Medicare Wellness Visit    Subjective      Oksana Smith is a 63 y.o. female who presents for a Subsequent Medicare Wellness Visit.    The following portions of the patient's history were reviewed and   updated as appropriate: allergies, current medications, past family history, past medical history, past social history, past surgical history, and problem list.    Compared to one year ago, the patient feels her physical   health is the same.    Compared to one year ago, the patient feels her mental   health is the same.    Recent Hospitalizations:  She was not admitted to the hospital during the last year.       Current Medical Providers:  Patient Care Team:  Portia Serna MD as PCP - General (Pediatrics)    Outpatient Medications Prior to Visit   Medication Sig Dispense Refill    ammonium lactate (AmLactin) 12 % lotion Apply  topically to the appropriate area as directed As Needed for Dry Skin. 225 g 1    clotrimazole-betamethasone (Lotrisone) 1-0.05 % cream Apply 1 application  topically to the appropriate area as directed 2 (Two) Times a Day. 45 g 1    fluconazole (Diflucan) 150 MG tablet Take 1 tablet by mouth Every 3 (Three) Days. 2 tablet 1    levothyroxine (Synthroid) 50 MCG tablet Take 1 tablet by mouth Daily. 90 tablet 1    nystatin (MYCOSTATIN) 571222 UNIT/GM powder Apply  topically to the appropriate area as directed 4 (Four) Times a Day. 60 g 2    triamterene-hydrochlorothiazide (MAXZIDE-25) 37.5-25 MG per tablet Take 0.5 tablets by mouth Daily. 45 tablet 1    vitamin D (ERGOCALCIFEROL) 1.25 MG (03871 UT) capsule capsule Take 1 capsule by mouth Every 7 (Seven) Days. 12 capsule 3     No facility-administered medications prior to visit.       No opioid medication identified on active medication list. I have reviewed chart for other potential  high risk medication/s and harmful drug interactions in the elderly.        Aspirin is not on  "active medication list.  Aspirin use is not indicated based on review of current medical condition/s. Risk of harm outweighs potential benefits.  .    Patient Active Problem List   Diagnosis    Atypical ductal hyperplasia of breast    Thymoma    Chronic cough    Constipation    Disorder of diaphragm    Early satiety    Edema of lower extremity    Esophageal dysphagia    Fatigue    HTN (hypertension)    Hypothyroidism    Right knee pain    Rupture of patellar tendon    Vitamin B12 deficiency    Vitamin D deficiency    Acute otitis media    Cough    COVID-19 virus infection    S/P lumpectomy, right breast    Shortness of breath    Rectal bleeding    Malignant neoplasm of thymus    Low vitamin D level    Low serum vitamin B12    Lateral epicondylitis    History of tonsillectomy    Edema of lower extremity    Bilateral chronic serous otitis media    History of colon polyps    Bodies, loose, joint, knee, left    Primary osteoarthritis of left knee     Advance Care Planning   Advance Care Planning     Advance Directive is not on file.  ACP discussion was held with the patient during this visit. Patient does not have an advance directive, information provided.     Objective    Vitals:    05/15/24 1607   BP: 120/68   BP Location: Left arm   Patient Position: Sitting   Cuff Size: Adult   Pulse: 98   Resp: 20   Temp: 97.6 °F (36.4 °C)   TempSrc: Temporal   SpO2: 100%   Weight: 74.5 kg (164 lb 4 oz)   Height: 165.1 cm (65\")     Estimated body mass index is 27.33 kg/m² as calculated from the following:    Height as of this encounter: 165.1 cm (65\").    Weight as of this encounter: 74.5 kg (164 lb 4 oz).           Does the patient have evidence of cognitive impairment?   No    Lab Results   Component Value Date    TRIG 224 (H) 05/08/2024    HDL 43 05/08/2024     (H) 05/08/2024    VLDL 41 (H) 05/08/2024          HEALTH RISK ASSESSMENT    Smoking Status:  Social History     Tobacco Use   Smoking Status Never    Passive " exposure: Never   Smokeless Tobacco Never     Alcohol Consumption:  Social History     Substance and Sexual Activity   Alcohol Use Never     Fall Risk Screen:    EMMANUELLE Fall Risk Assessment was completed, and patient is at LOW risk for falls.Assessment completed on:5/15/2024    Depression Screenin/15/2024     4:00 PM   PHQ-2/PHQ-9 Depression Screening   Little Interest or Pleasure in Doing Things 0-->not at all   Feeling Down, Depressed or Hopeless 0-->not at all   PHQ-9: Brief Depression Severity Measure Score 0       Health Habits and Functional and Cognitive Screenin/15/2024     4:00 PM   Functional & Cognitive Status   Do you have difficulty preparing food and eating? No   Do you have difficulty bathing yourself, getting dressed or grooming yourself? No   Do you have difficulty using the toilet? No   Do you have difficulty moving around from place to place? No   Do you have trouble with steps or getting out of a bed or a chair? Yes   Current Diet Well Balanced Diet   Dental Exam Up to date   Eye Exam Up to date   Exercise (times per week) 0 times per week   Current Exercises Include No Regular Exercise   Do you need help using the phone?  No   Are you deaf or do you have serious difficulty hearing?  No   Do you need help to go to places out of walking distance? No   Do you need help shopping? No   Do you need help preparing meals?  No   Do you need help with housework?  No   Do you need help with laundry? No   Do you need help taking your medications? No   Do you need help managing money? No   Do you ever drive or ride in a car without wearing a seat belt? No   Have you felt unusual stress, anger or loneliness in the last month? No   Who do you live with? Spouse   If you need help, do you have trouble finding someone available to you? No   Do you have difficulty concentrating, remembering or making decisions? No       Age-appropriate Screening Schedule:  Refer to the list below for future  screening recommendations based on patient's age, sex and/or medical conditions. Orders for these recommended tests are listed in the plan section. The patient has been provided with a written plan.    Health Maintenance   Topic Date Due    ZOSTER VACCINE (1 of 2) Never done    RSV Vaccine - Adults (1 - 1-dose 60+ series) Never done    COVID-19 Vaccine (4 - 2023-24 season) 09/01/2023    ANNUAL WELLNESS VISIT  05/15/2024    INFLUENZA VACCINE  08/01/2024    BMI FOLLOWUP  01/11/2025    MAMMOGRAM  04/08/2026    COLORECTAL CANCER SCREENING  05/10/2029    TDAP/TD VACCINES (2 - Td or Tdap) 06/12/2033    HEPATITIS C SCREENING  Completed    Pneumococcal Vaccine 0-64  Aged Out                  CMS Preventative Services Quick Reference  Risk Factors Identified During Encounter:    None Identified    The above risks/problems have been discussed with the patient.  Pertinent information has been shared with the patient in the After Visit Summary.    Diagnoses and all orders for this visit:    1. Encounter for subsequent annual wellness visit (AWV) in Medicare patient (Primary)    2. Primary hypertension  -     CBC & Differential; Future  -     Comprehensive Metabolic Panel; Future  -     Lipid Panel; Future  -     TSH; Future  -     Hemoglobin A1c; Future    3. Hypothyroidism, unspecified type  -     CBC & Differential; Future  -     Comprehensive Metabolic Panel; Future  -     Lipid Panel; Future  -     TSH; Future  -     Hemoglobin A1c; Future    4. Vitamin D deficiency  -     vitamin D (ERGOCALCIFEROL) 1.25 MG (32597 UT) capsule capsule; Take 1 capsule by mouth Every 7 (Seven) Days.  Dispense: 12 capsule; Refill: 3  -     Vitamin D,25-Hydroxy; Future    5. Abnormal finding of blood chemistry, unspecified  -     Hemoglobin A1c; Future    Other orders  -     triamterene-hydrochlorothiazide (MAXZIDE-25) 37.5-25 MG per tablet; Take 0.5 tablets by mouth Daily.  Dispense: 45 tablet; Refill: 1  -     nystatin (MYCOSTATIN) 581803  UNIT/GM powder; Apply  topically to the appropriate area as directed 4 (Four) Times a Day.  Dispense: 60 g; Refill: 2  -     levothyroxine (Synthroid) 50 MCG tablet; Take 1 tablet by mouth Daily.  Dispense: 90 tablet; Refill: 1  -     fluconazole (Diflucan) 150 MG tablet; Take 1 tablet by mouth Every 3 (Three) Days.  Dispense: 2 tablet; Refill: 1  -     clotrimazole-betamethasone (Lotrisone) 1-0.05 % cream; Apply 1 Application topically to the appropriate area as directed 2 (Two) Times a Day.  Dispense: 45 g; Refill: 1  -     ammonium lactate (AmLactin) 12 % lotion; Apply  topically to the appropriate area as directed As Needed for Dry Skin.  Dispense: 225 g; Refill: 1        Follow Up:   Next Medicare Wellness visit to be scheduled in 1 year.      An After Visit Summary and PPPS were made available to the patient.

## 2024-07-24 ENCOUNTER — HOSPITAL ENCOUNTER (OUTPATIENT)
Dept: OTHER | Facility: HOSPITAL | Age: 64
Discharge: HOME | End: 2024-07-24

## 2024-08-13 ENCOUNTER — OFFICE VISIT (OUTPATIENT)
Dept: ORTHOPEDIC SURGERY | Facility: CLINIC | Age: 64
End: 2024-08-13
Payer: MEDICARE

## 2024-08-13 VITALS — WEIGHT: 164 LBS | HEIGHT: 65 IN | BODY MASS INDEX: 27.32 KG/M2 | HEART RATE: 83 BPM | OXYGEN SATURATION: 96 %

## 2024-08-13 DIAGNOSIS — M17.12 PRIMARY OSTEOARTHRITIS OF LEFT KNEE: Primary | ICD-10-CM

## 2024-08-13 RX ADMIN — LIDOCAINE HYDROCHLORIDE 5 ML: 10 INJECTION, SOLUTION INFILTRATION; PERINEURAL at 11:11

## 2024-08-13 RX ADMIN — TRIAMCINOLONE ACETONIDE 40 MG: 40 INJECTION, SUSPENSION INTRA-ARTICULAR; INTRAMUSCULAR at 11:11

## 2024-08-13 NOTE — PROGRESS NOTES
"Chief Complaint  Follow-up and Pain of the Left Knee     Subjective      Oksana Smith presents to [facility] for a follow up for her left knee. She has been treating her left knee osteoarthritis conservatively with injections. She was last seen in the office on 01/11/24 where she had a left knee steroid injection. She reports this gave her great relief and is requesting a repeat injection today in the office. She reports no new injury or falls since her last visit.     Allergies   Allergen Reactions    Clindamycin Hives, Itching and Nausea And Vomiting    Erythromycin Hives, Itching, GI Intolerance and Unknown - High Severity        Social History     Socioeconomic History    Marital status:    Tobacco Use    Smoking status: Never     Passive exposure: Never    Smokeless tobacco: Never   Vaping Use    Vaping status: Never Used   Substance and Sexual Activity    Alcohol use: Never    Drug use: Never    Sexual activity: Defer        I reviewed the patient's chief complaint, history of present illness, review of systems, past medical history, surgical history, family history, social history, medications, and allergy list.     Review of Systems     Constitutional: Denies fevers, chills, weight loss  Cardiovascular: Denies chest pain, shortness of breath  Skin: Denies rashes, acute skin changes  Neurologic: Denies headache, loss of consciousness  MSK: Left knee pain       Vital Signs:   Pulse 83   Ht 165.1 cm (65\")   Wt 74.4 kg (164 lb)   SpO2 96%   BMI 27.29 kg/m²            Ortho Exam    Physical Exam  General:Alert. No acute distress   Left lower extremity: Skin is intact, no erythema, no ecchymosis, no swelling, no effusion, no signs of infection, full extension, flexion 120, stable anterior/posterior drawer, stable to varus/valgus stress. Nontender to palpation, no pain with range of motion. Negative Paula, Negative Lachman.     Large Joint Arthrocentesis: L " knee  Date/Time: 8/13/2024 11:11 AM  Consent given by: patient  Site marked: site marked  Timeout: Immediately prior to procedure a time out was called to verify the correct patient, procedure, equipment, support staff and site/side marked as required   Supporting Documentation  Indications: pain   Procedure Details  Location: knee - L knee  Needle gauge: 21 G.  Medications administered: 5 mL lidocaine 1 %; 40 mg triamcinolone acetonide 40 MG/ML  Patient tolerance: patient tolerated the procedure well with no immediate complications    This injection documentation was Scribed for Wilberto Camarillo MD by Dahiana Perera MA.  08/13/24   11:11 EDT      Imaging Results (Most Recent)       None             Result Review :       CT chest wo IV contrast    Result Date: 7/24/2024  Narrative: ACCESSION NUMBER: [id number] DATE: 7/24/2024 13:44 EXAMINATION: CT Of The Chest Without Contrast PROVIDED INDICATION: Ordered for thymoma follow up. History of thymectomy in 2018 with thymoma being reportedly the final pathology result. COMPARISON: CT of the chest on 07/07/2023. TECHNIQUE: Axial computed tomographic images of the chest were acquired without administration of intravenous contrast. Images were reconstructed in the axial plane and reformatted as axial MIP, coronal and and sagittal MPR. FINDINGS: Lack of intravenous contrast limits evaluation of the vasculature and hilar structures. Lungs and large airways: The central airways are clear without evidence of endobronchial lesions or secretions. No concerning solid noncalcified lung nodule or mass. Unchanged linear opacities of the right middle lobe base and the basilar regions of the left upper and left lower lobes with elevation of the left diaphragm are consistent with minimal to mild subsegmental atelectasis. Pleura: No pleural effusion. The calcific rectangular density along the left lower lobe basilar anterior pleura on coronal image 36 of series 5 may be a  postsurgical finding or dystrophic calcification. No pneumothorax. Mediastinum and Fela: The homogeneous soft tissue density mass measuring 1.4 x 1.1 cm in the left superior mediastinum between the left common carotid artery and the left subclavian artery has not significantly changed in size since the latest prior CT and may represent the known thymoma or an enlarged lymph node. Heart and Pericardium: Heart size normal. No pericardial effusion. Aortic valve mild calcification. Minimal calcification of the left anterior descending coronary artery. Vessels: The ascending aorta is nonaneurysmal with normal three vessel aortic arch anatomy.  The main pulmonary artery is nondilated. Mild aortic arch atherosclerotic calcification. Chest Wall and Lower Neck: Thyroid gland appears homogeneous and normal in size without evidence of nodule. No enlarged supraclavicular or axillary lymph nodes. Included Upper Abdomen: No evidence of acute abnormality. Again partially imaged are prominent lymph nodes measuring up to at least 1.1 cm in short axis with associated focal haziness of the left upper abdominal mesentery of the small bowel. Several unchanged coarse calcific densities abutting the inferior margin of the left hemidiaphragm could be postsurgical findings or dystrophic calcifications. The nondilated gallbladder contains dependent sludge. Unremarkable adrenal glands. Bones: No acute or pathologic fracture or other aggressive osseous abnormality. Mild degenerative changes of the imaged cervicothoracic spine. IMPRESSION: 1.  The known thymoma in the left upper mediastinum between the left common carotid and left subclavian arteries has not significantly changed in size since the chest CT on 07/07/2023. 2.  Unchanged elevation of the left hemidiaphragm and bibasilar, left greater than right, subsegmental minimal to mild atelectasis.   3.  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 Recommend further evaluation by CT of the abdomen and pelvis with IV contrast. \"I, the attending/teaching physician, have personally reviewed, discussed, and supervised this radiological examination with the resident and this report reflects my agreement.\" Dictated by: Susy Stone Signed by Celeste Rosales MD, PhD on 7/24/2024 23:14 ##### Final ##### Dictated by:    SUSY STONE MD-RAD Dictated DT/TM: 07/24/2024 11:14 pm Interpreted and electronically signed by:  CELESTE ROSALES MD-KENJI Signed DT/TM:  07/24/2024 11:14 pm            Assessment and Plan     Diagnoses and all orders for this visit:    1. Primary osteoarthritis of left knee (Primary)        The patient presents here today for a follow up for her left knee osteoarthritis.     Discussed the risks and benefits of a left knee steroid injection today in the office. Patient expressed understanding and wishes to proceed. She tolerated the injection well and without any complications.     Home exercises given today and will continue over the counter medications for pain control.     Call or return if worsening symptoms.    Follow Up     PRN     Patient was given instructions and counseling regarding her condition or for health maintenance advice. Please see specific information pulled into the AVS if appropriate.     Scribed for Wilberto Camarillo MD by Eleanor Ontiveros.  08/13/24   11:04 EDT    I have personally performed the services described in this document as scribed by the above individual and it is both accurate and complete. Wilberto Camarillo MD 08/14/24   "

## 2024-08-14 RX ORDER — LIDOCAINE HYDROCHLORIDE 10 MG/ML
5 INJECTION, SOLUTION INFILTRATION; PERINEURAL
Status: COMPLETED | OUTPATIENT
Start: 2024-08-13 | End: 2024-08-13

## 2024-08-14 RX ORDER — TRIAMCINOLONE ACETONIDE 40 MG/ML
40 INJECTION, SUSPENSION INTRA-ARTICULAR; INTRAMUSCULAR
Status: COMPLETED | OUTPATIENT
Start: 2024-08-13 | End: 2024-08-13

## 2024-08-19 ENCOUNTER — TELEPHONE (OUTPATIENT)
Dept: INTERNAL MEDICINE | Facility: CLINIC | Age: 64
End: 2024-08-19
Payer: MEDICARE

## 2024-08-19 DIAGNOSIS — R93.89 ABNORMAL CT OF THE CHEST: Primary | ICD-10-CM

## 2024-08-19 NOTE — TELEPHONE ENCOUNTER
Caller: Oksana Smith    Relationship: Self    Best call back number: [phone]     What orders are you requesting (i.e. lab or imaging): IMAGING     In what timeframe would the patient need to come in: ASAP     Where will you receive your lab[facility]imaging services[facility]     Additional notes: PATIENT RECENTLY HAD TESTING BACK ON 07/24/2024 AT [facility], CT OF THE CHEST W/O CONTRAST, FOLLOWING TEST IT WAS RECOMMEND FURTHER TESTING CT OF THE ABDOMEN AND PELVIS WITH CT CONTRAST. PLEASE CONTACT PATIENT WITH UPDATES.

## 2024-09-23 ENCOUNTER — HOSPITAL ENCOUNTER (OUTPATIENT)
Dept: CT IMAGING | Facility: HOSPITAL | Age: 64
Discharge: HOME | End: 2024-09-23
Admitting: INTERNAL MEDICINE
Payer: MEDICARE

## 2024-09-23 DIAGNOSIS — R93.89 ABNORMAL CT OF THE CHEST: ICD-10-CM

## 2024-09-23 LAB
CREAT BLDA-MCNC: 1.1 MG/DL (ref 0.6–1.3)
EGFRCR SERPLBLD CKD-EPI 2021: 57.3 ML/MIN/1.73

## 2024-09-23 PROCEDURE — 25510000001 IOPAMIDOL PER 1 ML: Performed by: INTERNAL MEDICINE

## 2024-09-23 PROCEDURE — 74177 CT ABD & PELVIS W/CONTRAST: CPT

## 2024-09-23 PROCEDURE — 82565 ASSAY OF CREATININE: CPT

## 2024-09-23 RX ORDER — IOPAMIDOL 755 MG/ML
100 INJECTION, SOLUTION INTRAVASCULAR
Status: COMPLETED | OUTPATIENT
Start: 2024-09-23 | End: 2024-09-23

## 2024-09-23 RX ADMIN — IOPAMIDOL 100 ML: 755 INJECTION, SOLUTION INTRAVENOUS at 10:59

## 2024-09-25 ENCOUNTER — OFFICE VISIT (OUTPATIENT)
Dept: INTERNAL MEDICINE | Facility: CLINIC | Age: 64
End: 2024-09-25
Payer: MEDICARE

## 2024-09-25 VITALS
OXYGEN SATURATION: 98 % | TEMPERATURE: 97.6 F | DIASTOLIC BLOOD PRESSURE: 74 MMHG | SYSTOLIC BLOOD PRESSURE: 124 MMHG | WEIGHT: 168.25 LBS | BODY MASS INDEX: 28.03 KG/M2 | RESPIRATION RATE: 18 BRPM | HEART RATE: 86 BPM | HEIGHT: 65 IN

## 2024-09-25 DIAGNOSIS — R93.5 ABNORMAL FINDINGS ON DIAGNOSTIC IMAGING OF OTHER ABDOMINAL REGIONS, INCLUDING RETROPERITONEUM: ICD-10-CM

## 2024-09-25 DIAGNOSIS — R59.0 MESENTERIC LYMPHADENOPATHY: Primary | ICD-10-CM

## 2024-09-25 LAB
BASOPHILS # BLD AUTO: 0.1 10*3/MM3 (ref 0–0.2)
BASOPHILS NFR BLD AUTO: 1.2 % (ref 0–1.5)
DEPRECATED RDW RBC AUTO: 40.7 FL (ref 37–54)
EOSINOPHIL # BLD AUTO: 0.27 10*3/MM3 (ref 0–0.4)
EOSINOPHIL NFR BLD AUTO: 3.3 % (ref 0.3–6.2)
ERYTHROCYTE [DISTWIDTH] IN BLOOD BY AUTOMATED COUNT: 12.4 % (ref 12.3–15.4)
HCT VFR BLD AUTO: 44.9 % (ref 34–46.6)
HGB BLD-MCNC: 14.9 G/DL (ref 12–15.9)
IMM GRANULOCYTES # BLD AUTO: 0.04 10*3/MM3 (ref 0–0.05)
IMM GRANULOCYTES NFR BLD AUTO: 0.5 % (ref 0–0.5)
LYMPHOCYTES # BLD AUTO: 2.09 10*3/MM3 (ref 0.7–3.1)
LYMPHOCYTES NFR BLD AUTO: 25.8 % (ref 19.6–45.3)
MCH RBC QN AUTO: 30.1 PG (ref 26.6–33)
MCHC RBC AUTO-ENTMCNC: 33.2 G/DL (ref 31.5–35.7)
MCV RBC AUTO: 90.7 FL (ref 79–97)
MONOCYTES # BLD AUTO: 0.5 10*3/MM3 (ref 0.1–0.9)
MONOCYTES NFR BLD AUTO: 6.2 % (ref 5–12)
NEUTROPHILS NFR BLD AUTO: 5.1 10*3/MM3 (ref 1.7–7)
NEUTROPHILS NFR BLD AUTO: 63 % (ref 42.7–76)
NRBC BLD AUTO-RTO: 0 /100 WBC (ref 0–0.2)
PLATELET # BLD AUTO: 412 10*3/MM3 (ref 140–450)
PMV BLD AUTO: 9.8 FL (ref 6–12)
RBC # BLD AUTO: 4.95 10*6/MM3 (ref 3.77–5.28)
WBC NRBC COR # BLD AUTO: 8.1 10*3/MM3 (ref 3.4–10.8)

## 2024-09-25 PROCEDURE — 85025 COMPLETE CBC W/AUTO DIFF WBC: CPT | Performed by: INTERNAL MEDICINE

## 2024-09-25 PROCEDURE — 3078F DIAST BP <80 MM HG: CPT | Performed by: INTERNAL MEDICINE

## 2024-09-25 PROCEDURE — 1160F RVW MEDS BY RX/DR IN RCRD: CPT | Performed by: INTERNAL MEDICINE

## 2024-09-25 PROCEDURE — 3074F SYST BP LT 130 MM HG: CPT | Performed by: INTERNAL MEDICINE

## 2024-09-25 PROCEDURE — 1159F MED LIST DOCD IN RCRD: CPT | Performed by: INTERNAL MEDICINE

## 2024-09-25 PROCEDURE — 99213 OFFICE O/P EST LOW 20 MIN: CPT | Performed by: INTERNAL MEDICINE

## 2024-10-04 ENCOUNTER — HOSPITAL ENCOUNTER (OUTPATIENT)
Dept: PET IMAGING | Facility: HOSPITAL | Age: 64
Discharge: HOME | End: 2024-10-04
Payer: MEDICARE

## 2024-10-04 DIAGNOSIS — R59.0 MESENTERIC LYMPHADENOPATHY: ICD-10-CM

## 2024-10-04 DIAGNOSIS — R93.5 ABNORMAL FINDINGS ON DIAGNOSTIC IMAGING OF OTHER ABDOMINAL REGIONS, INCLUDING RETROPERITONEUM: ICD-10-CM

## 2024-10-04 PROCEDURE — 0 FLUDEOXYGLUCOSE F18 SOLUTION: Performed by: INTERNAL MEDICINE

## 2024-10-04 PROCEDURE — A9552 F18 FDG: HCPCS | Performed by: INTERNAL MEDICINE

## 2024-10-04 PROCEDURE — 78815 PET IMAGE W/CT SKULL-THIGH: CPT

## 2024-10-04 RX ADMIN — FLUDEOXYGLUCOSE F 18 1 DOSE: 200 INJECTION, SOLUTION INTRAVENOUS at 10:40

## 2024-10-07 ENCOUNTER — TELEPHONE (OUTPATIENT)
Dept: INTERNAL MEDICINE | Facility: CLINIC | Age: 64
End: 2024-10-07

## 2024-10-07 ENCOUNTER — OFFICE VISIT (OUTPATIENT)
Dept: INTERNAL MEDICINE | Facility: CLINIC | Age: 64
End: 2024-10-07
Payer: MEDICARE

## 2024-10-07 VITALS
OXYGEN SATURATION: 97 % | HEART RATE: 95 BPM | BODY MASS INDEX: 27.66 KG/M2 | DIASTOLIC BLOOD PRESSURE: 74 MMHG | TEMPERATURE: 96.3 F | SYSTOLIC BLOOD PRESSURE: 118 MMHG | HEIGHT: 65 IN | WEIGHT: 166 LBS

## 2024-10-07 DIAGNOSIS — E04.1 THYROID NODULE: ICD-10-CM

## 2024-10-07 DIAGNOSIS — R59.0 MESENTERIC LYMPHADENOPATHY: ICD-10-CM

## 2024-10-07 DIAGNOSIS — R94.8 ABNORMAL PET SCAN, RETROPERITONEAL: Primary | ICD-10-CM

## 2024-10-07 DIAGNOSIS — R59.0 MESENTERIC LYMPHADENOPATHY: Primary | ICD-10-CM

## 2024-10-07 PROCEDURE — 99214 OFFICE O/P EST MOD 30 MIN: CPT | Performed by: INTERNAL MEDICINE

## 2024-10-07 PROCEDURE — 1160F RVW MEDS BY RX/DR IN RCRD: CPT | Performed by: INTERNAL MEDICINE

## 2024-10-07 PROCEDURE — 3078F DIAST BP <80 MM HG: CPT | Performed by: INTERNAL MEDICINE

## 2024-10-07 PROCEDURE — 1159F MED LIST DOCD IN RCRD: CPT | Performed by: INTERNAL MEDICINE

## 2024-10-07 PROCEDURE — 3074F SYST BP LT 130 MM HG: CPT | Performed by: INTERNAL MEDICINE

## 2024-10-07 NOTE — PROGRESS NOTES
"Chief Complaint  Results (PET scan)    Subjective      Oksana Smith is a 63 y.o. female who presents to [facility]     Presenting for discussion of results of recent PET scan obtained for findings of persistent mesenteric LAD on CT.     PET showing mesenteric and retrocaval LAD concerning for reactive vs. Low grade lymphoma.     PET also showed a hypermetabolic thyroid nodule.     Objective   Vital Signs:   Vitals:    10/07/24 0809   BP: 118/74   Pulse: 95   Temp: 96.3 °F (35.7 °C)   SpO2: 97%   Weight: 75.3 kg (166 lb)   Height: 165.1 cm (65\")     Body mass index is 27.62 kg/m².    Wt Readings from Last 3 Encounters:   10/07/24 75.3 kg (166 lb)   09/25/24 76.3 kg (168 lb 4 oz)   08/13/24 74.4 kg (164 lb)     BP Readings from Last 3 Encounters:   10/07/24 118/74   09/25/24 124/74   05/15/24 120/68       Health Maintenance   Topic Date Due    ZOSTER VACCINE (1 of 2) Never done    BMI FOLLOWUP  01/11/2025    ANNUAL WELLNESS VISIT  05/15/2025    MAMMOGRAM  04/08/2026    COLORECTAL CANCER SCREENING  05/10/2029    TDAP/TD VACCINES (2 - Td or Tdap) 06/12/2033    HEPATITIS C SCREENING  Completed    COVID-19 Vaccine  Completed    INFLUENZA VACCINE  Completed    Pneumococcal Vaccine 0-64  Aged Out       Physical Exam  Constitutional:       Appearance: Normal appearance.   HENT:      Head: Normocephalic and atraumatic.   Eyes:      General: No scleral icterus.        Right eye: No discharge.         Left eye: No discharge.      Conjunctiva/sclera: Conjunctivae normal.   Pulmonary:      Effort: Pulmonary effort is normal.   Skin:     Findings: No lesion or rash.   Neurological:      Mental Status: She is alert and oriented to person, place, and time. Mental status is at baseline.   Psychiatric:         Mood and Affect: Mood normal.         Behavior: Behavior normal.         Thought Content: Thought content normal.         Judgment: Judgment normal.          Result Review " :  The following data was reviewed by: Portia Serna MD on 10/07/2024:    Data reviewed : Radiologic studies      NM PET/CT Skull Base to Mid Thigh    Result Date: 10/4/2024  Impression: 1 cm hypermetabolic left thyroid nodule. Recommend a follow-up left thyroid ultrasound examination. 2. Hypermetabolic lymph nodes in the mesentery and retrocaval region possibly reactive in nature or low-grade lymphoma. No other hypermetabolic nodes are identified in the neck, chest, abdomen or pelvis. Electronically Signed: Percy Forbes MD  10/4/2024 12:59 PM EDT  Workstation ID: [id number]    CT Abdomen Pelvis With Contrast    Result Date: 9/24/2024  Impression: 1. Nonspecific retroperitoneal and mesenteric adenopathy in the upper abdomen possibly sclerosing mesenteritis or reactive in nature. Low-grade lymphoma is also in the differential diagnosis. Suggest a PET/CT scan for further evaluation. 2. Mild fatty infiltration of the liver. 3. The urinary bladder is decompressed with adjacent stranding. Suggest correlation with any history of cystitis. 4. 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 The superior mesenteric artery and inferior mesenteric artery are widely patent. Electronically Signed: Percy Forbes MD  9/24/2024 12:25 PM EDT  Workstation ID: [id number]      Procedures          Assessment & Plan  Mesenteric lymphadenopathy  Results discussed with patient. Discussed that getting a biopsy of the lymph nodes is likely to be the next step in evaluation. Discussed that I had spoken with general surgery and these lymph nodes are hard to access and can be associated with complications when biopsied.   She will make an appointment with her oncologist at [facility] as well as her CT surgeon at [facility] to discuss next steps. She will send a message to let us know if she needs referrals to general surgery locally or to clinic in [place].  Thyroid nodule  Thyroid ultrasound ordered to further  evaluate.  Orders Placed This Encounter   Procedures    US Thyroid             I spent 30 minutes caring for Oksana on this date of service. This time includes time spent by me in the following activities:preparing for the visit, reviewing tests, performing a medically appropriate examination and/or evaluation , counseling and educating the patient/family/caregiver, ordering medications, tests, or procedures, referring and communicating with other health care professionals , and documenting information in the medical record  FOLLOW UP  Return for Keep previously scheduled follow up appointment, or sooner if needed.  Patient was given instructions and counseling regarding her condition or for health maintenance advice. Please see specific information pulled into the AVS if appropriate.       Portia Serna MD  10/07/24  10:06 EDT    CURRENT & DISCONTINUED MEDICATIONS  Current Outpatient Medications   Medication Instructions    ammonium lactate (AmLactin) 12 % lotion Topical, As Needed    clotrimazole-betamethasone (Lotrisone) 1-0.05 % cream 1 Application, Topical, 2 Times Daily    fluconazole (DIFLUCAN) 150 mg, Oral, Every 3 Days    levothyroxine (SYNTHROID) 50 mcg, Oral, Daily    nystatin (MYCOSTATIN) 955866 UNIT/GM powder Topical, 4 Times Daily    triamterene-hydrochlorothiazide (MAXZIDE-25) 37.5-25 MG per tablet 0.5 tablets, Oral, Daily    vitamin D (ERGOCALCIFEROL) 50,000 Units, Oral, Every 7 Days       There are no discontinued medications.

## 2024-10-07 NOTE — TELEPHONE ENCOUNTER
Caller: Oksana Smith    Relationship: Self    Best call back number: [phone]    What is the medical concern/diagnosis: PET SCAN     What specialty or service is being requested: ONCOLOGY     What is the provider, practice or medical service name: [facility]     What is the office location:     [address] [place] [address]       What is the office phone number: [phone]

## 2024-10-14 ENCOUNTER — HOSPITAL ENCOUNTER (OUTPATIENT)
Dept: ULTRASOUND IMAGING | Facility: HOSPITAL | Age: 64
Discharge: HOME | End: 2024-10-14
Admitting: INTERNAL MEDICINE
Payer: MEDICARE

## 2024-10-14 PROCEDURE — 76536 US EXAM OF HEAD AND NECK: CPT

## 2024-11-08 ENCOUNTER — CLINICAL SUPPORT (OUTPATIENT)
Dept: INTERNAL MEDICINE | Facility: CLINIC | Age: 64
End: 2024-11-08
Payer: MEDICARE

## 2024-11-08 DIAGNOSIS — E03.9 HYPOTHYROIDISM, UNSPECIFIED TYPE: ICD-10-CM

## 2024-11-08 DIAGNOSIS — E55.9 VITAMIN D DEFICIENCY: ICD-10-CM

## 2024-11-08 DIAGNOSIS — I10 PRIMARY HYPERTENSION: ICD-10-CM

## 2024-11-08 DIAGNOSIS — E53.8 VITAMIN B12 DEFICIENCY: Primary | ICD-10-CM

## 2024-11-08 DIAGNOSIS — R79.9 ABNORMAL FINDING OF BLOOD CHEMISTRY, UNSPECIFIED: ICD-10-CM

## 2024-11-08 LAB
25(OH)D3 SERPL-MCNC: 35.4 NG/ML (ref 30–100)
ALBUMIN SERPL-MCNC: 3.6 G/DL (ref 3.5–5.2)
ALBUMIN/GLOB SERPL: 1 G/DL
ALP SERPL-CCNC: 74 U/L (ref 39–117)
ALT SERPL W P-5'-P-CCNC: 9 U/L (ref 1–33)
ANION GAP SERPL CALCULATED.3IONS-SCNC: 8.6 MMOL/L (ref 5–15)
AST SERPL-CCNC: 12 U/L (ref 1–32)
BASOPHILS # BLD AUTO: 0.07 10*3/MM3 (ref 0–0.2)
BASOPHILS NFR BLD AUTO: 0.9 % (ref 0–1.5)
BILIRUB SERPL-MCNC: 0.6 MG/DL (ref 0–1.2)
BUN SERPL-MCNC: 14 MG/DL (ref 8–23)
BUN/CREAT SERPL: 12.5 (ref 7–25)
CALCIUM SPEC-SCNC: 9.6 MG/DL (ref 8.6–10.5)
CHLORIDE SERPL-SCNC: 101 MMOL/L (ref 98–107)
CHOLEST SERPL-MCNC: 261 MG/DL (ref 0–200)
CO2 SERPL-SCNC: 29.4 MMOL/L (ref 22–29)
CREAT SERPL-MCNC: 1.12 MG/DL (ref 0.57–1)
DEPRECATED RDW RBC AUTO: 40.2 FL (ref 37–54)
EGFRCR SERPLBLD CKD-EPI 2021: 56.1 ML/MIN/1.73
EOSINOPHIL # BLD AUTO: 0.34 10*3/MM3 (ref 0–0.4)
EOSINOPHIL NFR BLD AUTO: 4.4 % (ref 0.3–6.2)
ERYTHROCYTE [DISTWIDTH] IN BLOOD BY AUTOMATED COUNT: 12.3 % (ref 12.3–15.4)
GLOBULIN UR ELPH-MCNC: 3.6 GM/DL
GLUCOSE SERPL-MCNC: 88 MG/DL (ref 65–99)
HBA1C MFR BLD: 5.7 % (ref 4.8–5.6)
HCT VFR BLD AUTO: 44.3 % (ref 34–46.6)
HDLC SERPL-MCNC: 48 MG/DL (ref 40–60)
HGB BLD-MCNC: 14.8 G/DL (ref 12–15.9)
IMM GRANULOCYTES # BLD AUTO: 0.04 10*3/MM3 (ref 0–0.05)
IMM GRANULOCYTES NFR BLD AUTO: 0.5 % (ref 0–0.5)
LDLC SERPL CALC-MCNC: 184 MG/DL (ref 0–100)
LDLC/HDLC SERPL: 3.78 {RATIO}
LYMPHOCYTES # BLD AUTO: 2.19 10*3/MM3 (ref 0.7–3.1)
LYMPHOCYTES NFR BLD AUTO: 28.4 % (ref 19.6–45.3)
MCH RBC QN AUTO: 29.9 PG (ref 26.6–33)
MCHC RBC AUTO-ENTMCNC: 33.4 G/DL (ref 31.5–35.7)
MCV RBC AUTO: 89.5 FL (ref 79–97)
MONOCYTES # BLD AUTO: 0.46 10*3/MM3 (ref 0.1–0.9)
MONOCYTES NFR BLD AUTO: 6 % (ref 5–12)
NEUTROPHILS NFR BLD AUTO: 4.61 10*3/MM3 (ref 1.7–7)
NEUTROPHILS NFR BLD AUTO: 59.8 % (ref 42.7–76)
NRBC BLD AUTO-RTO: 0 /100 WBC (ref 0–0.2)
PLATELET # BLD AUTO: 426 10*3/MM3 (ref 140–450)
PMV BLD AUTO: 9.3 FL (ref 6–12)
POTASSIUM SERPL-SCNC: 3.7 MMOL/L (ref 3.5–5.2)
PROT SERPL-MCNC: 7.2 G/DL (ref 6–8.5)
RBC # BLD AUTO: 4.95 10*6/MM3 (ref 3.77–5.28)
SODIUM SERPL-SCNC: 139 MMOL/L (ref 136–145)
TRIGL SERPL-MCNC: 158 MG/DL (ref 0–150)
TSH SERPL DL<=0.05 MIU/L-ACNC: 1.7 UIU/ML (ref 0.27–4.2)
VLDLC SERPL-MCNC: 29 MG/DL (ref 5–40)
WBC NRBC COR # BLD AUTO: 7.71 10*3/MM3 (ref 3.4–10.8)

## 2024-11-08 PROCEDURE — 83036 HEMOGLOBIN GLYCOSYLATED A1C: CPT | Performed by: INTERNAL MEDICINE

## 2024-11-08 PROCEDURE — 84443 ASSAY THYROID STIM HORMONE: CPT | Performed by: INTERNAL MEDICINE

## 2024-11-08 PROCEDURE — 80061 LIPID PANEL: CPT | Performed by: INTERNAL MEDICINE

## 2024-11-08 PROCEDURE — 80053 COMPREHEN METABOLIC PANEL: CPT | Performed by: INTERNAL MEDICINE

## 2024-11-08 PROCEDURE — 82306 VITAMIN D 25 HYDROXY: CPT | Performed by: INTERNAL MEDICINE

## 2024-11-08 PROCEDURE — 36415 COLL VENOUS BLD VENIPUNCTURE: CPT | Performed by: INTERNAL MEDICINE

## 2024-11-08 PROCEDURE — 85025 COMPLETE CBC W/AUTO DIFF WBC: CPT | Performed by: INTERNAL MEDICINE

## 2024-11-08 NOTE — PROGRESS NOTES
Venipuncture Blood Specimen Collection  Venipuncture performed in [facility] by Debbi Olivares RN with good hemostasis. Patient tolerated the procedure well without complications.   11/08/24   Debbi Olivares RN

## 2024-11-15 ENCOUNTER — OFFICE VISIT (OUTPATIENT)
Dept: INTERNAL MEDICINE | Facility: CLINIC | Age: 64
End: 2024-11-15
Payer: MEDICARE

## 2024-11-15 VITALS
RESPIRATION RATE: 20 BRPM | TEMPERATURE: 97.6 F | HEIGHT: 65 IN | HEART RATE: 97 BPM | DIASTOLIC BLOOD PRESSURE: 74 MMHG | SYSTOLIC BLOOD PRESSURE: 112 MMHG | OXYGEN SATURATION: 98 % | BODY MASS INDEX: 27.91 KG/M2 | WEIGHT: 167.5 LBS

## 2024-11-15 DIAGNOSIS — R79.9 ABNORMAL FINDING OF BLOOD CHEMISTRY, UNSPECIFIED: ICD-10-CM

## 2024-11-15 DIAGNOSIS — E55.9 VITAMIN D DEFICIENCY: ICD-10-CM

## 2024-11-15 DIAGNOSIS — I10 PRIMARY HYPERTENSION: Primary | ICD-10-CM

## 2024-11-15 DIAGNOSIS — E03.9 HYPOTHYROIDISM, UNSPECIFIED TYPE: ICD-10-CM

## 2024-11-15 DIAGNOSIS — C82.93 FOLLICULAR LYMPHOMA OF INTRA-ABDOMINAL LYMPH NODES, UNSPECIFIED FOLLICULAR LYMPHOMA TYPE: ICD-10-CM

## 2024-11-15 PROBLEM — R73.01 IMPAIRED FASTING GLUCOSE: Status: ACTIVE | Noted: 2024-11-15

## 2024-11-15 PROCEDURE — 1160F RVW MEDS BY RX/DR IN RCRD: CPT | Performed by: INTERNAL MEDICINE

## 2024-11-15 PROCEDURE — G2211 COMPLEX E/M VISIT ADD ON: HCPCS | Performed by: INTERNAL MEDICINE

## 2024-11-15 PROCEDURE — 1159F MED LIST DOCD IN RCRD: CPT | Performed by: INTERNAL MEDICINE

## 2024-11-15 PROCEDURE — 3074F SYST BP LT 130 MM HG: CPT | Performed by: INTERNAL MEDICINE

## 2024-11-15 PROCEDURE — 99214 OFFICE O/P EST MOD 30 MIN: CPT | Performed by: INTERNAL MEDICINE

## 2024-11-15 PROCEDURE — 3078F DIAST BP <80 MM HG: CPT | Performed by: INTERNAL MEDICINE

## 2024-11-15 RX ORDER — ERGOCALCIFEROL 1.25 MG/1
50000 CAPSULE, LIQUID FILLED ORAL
Qty: 12 CAPSULE | Refills: 3 | Status: SHIPPED | OUTPATIENT
Start: 2024-11-15

## 2024-11-15 RX ORDER — LEVOTHYROXINE SODIUM 50 UG/1
50 TABLET ORAL DAILY
Qty: 90 TABLET | Refills: 1 | Status: SHIPPED | OUTPATIENT
Start: 2024-11-15

## 2024-11-15 RX ORDER — PRAVASTATIN SODIUM 10 MG
10 TABLET ORAL DAILY
Qty: 90 TABLET | Refills: 1 | Status: SHIPPED | OUTPATIENT
Start: 2024-11-15

## 2024-11-15 RX ORDER — TRIAMTERENE AND HYDROCHLOROTHIAZIDE 37.5; 25 MG/1; MG/1
0.5 TABLET ORAL DAILY
Qty: 45 TABLET | Refills: 1 | Status: SHIPPED | OUTPATIENT
Start: 2024-11-15

## 2024-11-15 RX ORDER — AMMONIUM LACTATE 12 G/100G
LOTION TOPICAL AS NEEDED
Qty: 225 G | Refills: 1 | Status: SHIPPED | OUTPATIENT
Start: 2024-11-15

## 2024-11-15 RX ORDER — FLUCONAZOLE 150 MG/1
150 TABLET ORAL
Qty: 2 TABLET | Refills: 1 | Status: SHIPPED | OUTPATIENT
Start: 2024-11-15

## 2024-11-15 RX ORDER — NYSTATIN 100000 [USP'U]/G
POWDER TOPICAL 3 TIMES DAILY
Qty: 60 G | Refills: 2 | Status: SHIPPED | OUTPATIENT
Start: 2024-11-15

## 2024-11-15 RX ORDER — CLOTRIMAZOLE AND BETAMETHASONE DIPROPIONATE 10; .64 MG/G; MG/G
1 CREAM TOPICAL 2 TIMES DAILY
Qty: 45 G | Refills: 1 | Status: SHIPPED | OUTPATIENT
Start: 2024-11-15

## 2024-11-15 NOTE — ASSESSMENT & PLAN NOTE
Had bx and tissue dx. Has met with oncology and monitoring for now. Has follow up scheduled.

## 2024-11-15 NOTE — ASSESSMENT & PLAN NOTE
Well controlled in clinic today  Continue current management    Orders:    CBC & Differential; Future    Comprehensive Metabolic Panel; Future    Hemoglobin A1c; Future    Lipid Panel; Future    TSH Rfx On Abnormal To Free T4; Future

## 2024-11-15 NOTE — ASSESSMENT & PLAN NOTE
TSH normal on recent labs. Future labs ordered to be drawn prior to follow up visit in 6 months  Orders:    CBC & Differential; Future    Comprehensive Metabolic Panel; Future    Hemoglobin A1c; Future    Lipid Panel; Future    TSH Rfx On Abnormal To Free T4; Future

## 2024-11-15 NOTE — PROGRESS NOTES
"Chief Complaint  Follow-up (6 month follow up), Hypertension, and Hypothyroidism    Subjective      Oksana Smith is a 63 y.o. female who presents to [facility]     Presenting for follow up    Has recently had bx of mesenteric lymph nodes and dx with follicular lymphoma. She has seen an oncologist and has a plan for monitoring. No treatment at this time.     Hypothyroidism: TSH normal on recent labs    HTN:  well controlled today, doing well on medication, denies headache, chest pain, dizziness, vision changes    Objective   Vital Signs:   Vitals:    11/15/24 0847   BP: 112/74   BP Location: Left arm   Patient Position: Sitting   Cuff Size: Adult   Pulse: 97   Resp: 20   Temp: 97.6 °F (36.4 °C)   TempSrc: Temporal   SpO2: 98%   Weight: 76 kg (167 lb 8 oz)   Height: 165.1 cm (65\")     Body mass index is 27.87 kg/m².    Wt Readings from Last 3 Encounters:   11/15/24 76 kg (167 lb 8 oz)   10/07/24 75.3 kg (166 lb)   09/25/24 76.3 kg (168 lb 4 oz)     BP Readings from Last 3 Encounters:   11/15/24 112/74   10/07/24 118/74   09/25/24 124/74       Health Maintenance   Topic Date Due    ZOSTER VACCINE (1 of 2) Never done    COVID-19 Vaccine (9 - 2024-25 season) 10/10/2024    BMI FOLLOWUP  01/11/2025    ANNUAL WELLNESS VISIT  05/15/2025    MAMMOGRAM  04/08/2026    COLORECTAL CANCER SCREENING  05/10/2029    TDAP/TD VACCINES (7 - Td or Tdap) 06/12/2033    HEPATITIS C SCREENING  Completed    Orthopox/Monkeypox  Completed    INFLUENZA VACCINE  Completed    Pneumococcal Vaccine 0-64  Aged Out       Physical Exam  Vitals reviewed.   Constitutional:       Appearance: Normal appearance. She is well-developed.   HENT:      Head: Normocephalic and atraumatic.      Mouth/Throat:      Pharynx: No oropharyngeal exudate.   Eyes:      Conjunctiva/sclera: Conjunctivae normal.      Pupils: Pupils are equal, round, and reactive to light.   Neck:      Thyroid: No thyromegaly or thyroid " tenderness.   Cardiovascular:      Rate and Rhythm: Normal rate and regular rhythm.      Heart sounds: No murmur heard.     No friction rub. No gallop.   Pulmonary:      Effort: Pulmonary effort is normal.      Breath sounds: Normal breath sounds. No wheezing or rhonchi.   Lymphadenopathy:      Cervical: No cervical adenopathy.   Skin:     General: Skin is warm and dry.   Neurological:      Mental Status: She is alert and oriented to person, place, and time.   Psychiatric:         Mood and Affect: Affect normal.          Result Review :  The following data was reviewed by: Portia Serna MD on 11/15/2024:  CMP          5/8/2024    10:00 9/23/2024    10:59 11/8/2024    09:55   CMP   Glucose 89   88    BUN 14   14    Creatinine 0.93  1.10  1.12    EGFR 70.1  57.3  56.1    Sodium 139   139    Potassium 3.6   3.7    Chloride 101   101    Calcium 9.4   9.6    Total Protein 6.8   7.2    Albumin 4.1   3.6    Globulin 2.7   3.6    Total Bilirubin 0.5   0.6    Alkaline Phosphatase 63   74    AST (SGOT) 15   12    ALT (SGPT) 6   9    Albumin/Globulin Ratio 1.5   1.0    BUN/Creatinine Ratio 15.1   12.5    Anion Gap 8.8   8.6      CBC w/diff          5/8/2024    10:00 9/25/2024    10:58 11/8/2024    09:55   CBC w/Diff   WBC 6.48  8.10  7.71    RBC 4.68  4.95  4.95    Hemoglobin 14.3  14.9  14.8    Hematocrit 42.7  44.9  44.3    MCV 91.2  90.7  89.5    MCH 30.6  30.1  29.9    MCHC 33.5  33.2  33.4    RDW 12.2  12.4  12.3    Platelets 353  412  426    Neutrophil Rel % 59.4  63.0  59.8    Immature Granulocyte Rel % 0.3  0.5  0.5    Lymphocyte Rel % 28.2  25.8  28.4    Monocyte Rel % 7.1  6.2  6.0    Eosinophil Rel % 3.9  3.3  4.4    Basophil Rel % 1.1  1.2  0.9      Lipid Panel          5/8/2024    10:00 11/8/2024    09:55   Lipid Panel   Total Cholesterol 234  261    Triglycerides 224  158    HDL Cholesterol 43  48    VLDL Cholesterol 41  29    LDL Cholesterol  150  184    LDL/HDL Ratio 3.40  3.78      TSH           "5/8/2024    10:00 11/8/2024    09:55   TSH   TSH 1.860  1.700      Most Recent A1C          11/8/2024    09:55   HGBA1C Most Recent   Hemoglobin A1C 5.70      No components found for: \"[id number]\"       Procedures          Assessment & Plan  Primary hypertension  Well controlled in clinic today  Continue current management    Orders:    CBC & Differential; Future    Comprehensive Metabolic Panel; Future    Hemoglobin A1c; Future    Lipid Panel; Future    TSH Rfx On Abnormal To Free T4; Future    Hypothyroidism, unspecified type  TSH normal on recent labs. Future labs ordered to be drawn prior to follow up visit in 6 months  Orders:    CBC & Differential; Future    Comprehensive Metabolic Panel; Future    Hemoglobin A1c; Future    Lipid Panel; Future    TSH Rfx On Abnormal To Free T4; Future    Vitamin D deficiency  Supplement refilled. Follow up labs ordered  Orders:    vitamin D (ERGOCALCIFEROL) 1.25 MG (51572 UT) capsule capsule; Take 1 capsule by mouth Every 7 (Seven) Days.    Vitamin D,25-Hydroxy; Future    Abnormal finding of blood chemistry, unspecified    Orders:    Hemoglobin A1c; Future    Follicular lymphoma of intra-abdominal lymph nodes, unspecified follicular lymphoma type  Had bx and tissue dx. Has met with oncology and monitoring for now. Has follow up scheduled.                       FOLLOW UP  Return in about 6 months (around 5/15/2025) for Next scheduled follow up, Medicare Wellness.  Patient was given instructions and counseling regarding her condition or for health maintenance advice. Please see specific information pulled into the AVS if appropriate.       Portia Serna MD  11/15/24  10:02 EST    CURRENT & DISCONTINUED MEDICATIONS  Current Outpatient Medications   Medication Instructions    ammonium lactate (AmLactin) 12 % lotion Topical, As Needed    clotrimazole-betamethasone (Lotrisone) 1-0.05 % cream 1 Application, Topical, 2 Times Daily    fluconazole (DIFLUCAN) 150 mg, Oral, " Every 3 Days    levothyroxine (SYNTHROID) 50 mcg, Oral, Daily    nystatin (MYCOSTATIN) 276408 UNIT/GM powder Topical, 3 Times Daily    pravastatin (PRAVACHOL) 10 mg, Oral, Daily    triamterene-hydrochlorothiazide (MAXZIDE-25) 37.5-25 MG per tablet 0.5 tablets, Oral, Daily    vitamin D (ERGOCALCIFEROL) 50,000 Units, Oral, Every 7 Days       Medications Discontinued During This Encounter   Medication Reason    vitamin D (ERGOCALCIFEROL) 1.25 MG (01274 UT) capsule capsule Reorder    triamterene-hydrochlorothiazide (MAXZIDE-25) 37.5-25 MG per tablet Reorder    nystatin (MYCOSTATIN) 266390 UNIT/GM powder Reorder    levothyroxine (Synthroid) 50 MCG tablet Reorder    fluconazole (Diflucan) 150 MG tablet Reorder    clotrimazole-betamethasone (Lotrisone) 1-0.05 % cream Reorder    ammonium lactate (AmLactin) 12 % lotion Reorder

## 2024-11-15 NOTE — ASSESSMENT & PLAN NOTE
Supplement refilled. Follow up labs ordered  Orders:    vitamin D (ERGOCALCIFEROL) 1.25 MG (87848 UT) capsule capsule; Take 1 capsule by mouth Every 7 (Seven) Days.    Vitamin D,25-Hydroxy; Future

## 2025-01-03 PROCEDURE — 87637 SARSCOV2&INF A&B&RSV AMP PRB: CPT

## 2025-01-03 PROCEDURE — 85025 COMPLETE CBC W/AUTO DIFF WBC: CPT

## 2025-01-03 PROCEDURE — 99283 EMERGENCY DEPT VISIT LOW MDM: CPT

## 2025-01-03 PROCEDURE — 83605 ASSAY OF LACTIC ACID: CPT

## 2025-01-03 PROCEDURE — 80053 COMPREHEN METABOLIC PANEL: CPT

## 2025-01-03 PROCEDURE — 36415 COLL VENOUS BLD VENIPUNCTURE: CPT

## 2025-01-03 PROCEDURE — 87040 BLOOD CULTURE FOR BACTERIA: CPT

## 2025-01-03 RX ORDER — SODIUM CHLORIDE 0.9 % (FLUSH) 0.9 %
10 SYRINGE (ML) INJECTION AS NEEDED
Status: DISCONTINUED | OUTPATIENT
Start: 2025-01-03 | End: 2025-01-04 | Stop reason: HOSPADM

## 2025-01-04 ENCOUNTER — APPOINTMENT (OUTPATIENT)
Dept: GENERAL RADIOLOGY | Facility: HOSPITAL | Age: 65
End: 2025-01-04
Payer: MEDICARE

## 2025-01-04 ENCOUNTER — HOSPITAL ENCOUNTER (EMERGENCY)
Facility: HOSPITAL | Age: 65
Discharge: HOME | End: 2025-01-04
Attending: EMERGENCY MEDICINE
Payer: MEDICARE

## 2025-01-04 VITALS
HEIGHT: 65 IN | HEART RATE: 102 BPM | WEIGHT: 170.86 LBS | RESPIRATION RATE: 19 BRPM | DIASTOLIC BLOOD PRESSURE: 73 MMHG | TEMPERATURE: 98.2 F | BODY MASS INDEX: 28.47 KG/M2 | OXYGEN SATURATION: 96 % | SYSTOLIC BLOOD PRESSURE: 139 MMHG

## 2025-01-04 DIAGNOSIS — J10.1 INFLUENZA A: Primary | ICD-10-CM

## 2025-01-04 DIAGNOSIS — R05.1 ACUTE COUGH: ICD-10-CM

## 2025-01-04 DIAGNOSIS — R50.9 FEVER, UNSPECIFIED FEVER CAUSE: ICD-10-CM

## 2025-01-04 DIAGNOSIS — G44.209 ACUTE NON INTRACTABLE TENSION-TYPE HEADACHE: ICD-10-CM

## 2025-01-04 LAB
ALBUMIN SERPL-MCNC: 4.1 G/DL (ref 3.5–5.2)
ALBUMIN/GLOB SERPL: 1.2 G/DL
ALP SERPL-CCNC: 64 U/L (ref 39–117)
ALT SERPL W P-5'-P-CCNC: 13 U/L (ref 1–33)
ANION GAP SERPL CALCULATED.3IONS-SCNC: 10.7 MMOL/L (ref 5–15)
AST SERPL-CCNC: 16 U/L (ref 1–32)
BASOPHILS # BLD AUTO: 0.08 10*3/MM3 (ref 0–0.2)
BASOPHILS NFR BLD AUTO: 0.7 % (ref 0–1.5)
BILIRUB SERPL-MCNC: 0.4 MG/DL (ref 0–1.2)
BUN SERPL-MCNC: 17 MG/DL (ref 8–23)
BUN/CREAT SERPL: 16 (ref 7–25)
CALCIUM SPEC-SCNC: 9.5 MG/DL (ref 8.6–10.5)
CHLORIDE SERPL-SCNC: 102 MMOL/L (ref 98–107)
CO2 SERPL-SCNC: 25.3 MMOL/L (ref 22–29)
CREAT SERPL-MCNC: 1.06 MG/DL (ref 0.57–1)
D-LACTATE SERPL-SCNC: 1.2 MMOL/L (ref 0.5–2)
DEPRECATED RDW RBC AUTO: 42.2 FL (ref 37–54)
EGFRCR SERPLBLD CKD-EPI 2021: 59.5 ML/MIN/1.73
EOSINOPHIL # BLD AUTO: 0.12 10*3/MM3 (ref 0–0.4)
EOSINOPHIL NFR BLD AUTO: 1.1 % (ref 0.3–6.2)
ERYTHROCYTE [DISTWIDTH] IN BLOOD BY AUTOMATED COUNT: 13.1 % (ref 12.3–15.4)
FLUAV SUBTYP SPEC NAA+PROBE: DETECTED
FLUBV RNA ISLT QL NAA+PROBE: NOT DETECTED
GLOBULIN UR ELPH-MCNC: 3.4 GM/DL
GLUCOSE SERPL-MCNC: 135 MG/DL (ref 65–99)
HCT VFR BLD AUTO: 44.1 % (ref 34–46.6)
HGB BLD-MCNC: 14.7 G/DL (ref 12–15.9)
HOLD SPECIMEN: NORMAL
HOLD SPECIMEN: NORMAL
IMM GRANULOCYTES # BLD AUTO: 0.03 10*3/MM3 (ref 0–0.05)
IMM GRANULOCYTES NFR BLD AUTO: 0.3 % (ref 0–0.5)
LYMPHOCYTES # BLD AUTO: 0.44 10*3/MM3 (ref 0.7–3.1)
LYMPHOCYTES NFR BLD AUTO: 4.1 % (ref 19.6–45.3)
MCH RBC QN AUTO: 29.3 PG (ref 26.6–33)
MCHC RBC AUTO-ENTMCNC: 33.3 G/DL (ref 31.5–35.7)
MCV RBC AUTO: 88 FL (ref 79–97)
MONOCYTES # BLD AUTO: 0.54 10*3/MM3 (ref 0.1–0.9)
MONOCYTES NFR BLD AUTO: 5.1 % (ref 5–12)
NEUTROPHILS NFR BLD AUTO: 88.7 % (ref 42.7–76)
NEUTROPHILS NFR BLD AUTO: 9.48 10*3/MM3 (ref 1.7–7)
NRBC BLD AUTO-RTO: 0 /100 WBC (ref 0–0.2)
PLATELET # BLD AUTO: 338 10*3/MM3 (ref 140–450)
PMV BLD AUTO: 9.1 FL (ref 6–12)
POTASSIUM SERPL-SCNC: 3.8 MMOL/L (ref 3.5–5.2)
PROT SERPL-MCNC: 7.5 G/DL (ref 6–8.5)
RBC # BLD AUTO: 5.01 10*6/MM3 (ref 3.77–5.28)
RSV RNA NPH QL NAA+NON-PROBE: NOT DETECTED
SARS-COV-2 RNA RESP QL NAA+PROBE: NOT DETECTED
SODIUM SERPL-SCNC: 138 MMOL/L (ref 136–145)
WBC NRBC COR # BLD AUTO: 10.69 10*3/MM3 (ref 3.4–10.8)
WHOLE BLOOD HOLD COAG: NORMAL
WHOLE BLOOD HOLD SPECIMEN: NORMAL

## 2025-01-04 PROCEDURE — 87040 BLOOD CULTURE FOR BACTERIA: CPT

## 2025-01-04 PROCEDURE — 25010000002 DIPHENHYDRAMINE PER 50 MG: Performed by: NURSE PRACTITIONER

## 2025-01-04 PROCEDURE — 25810000003 SODIUM CHLORIDE 0.9 % SOLUTION: Performed by: NURSE PRACTITIONER

## 2025-01-04 PROCEDURE — 96375 TX/PRO/DX INJ NEW DRUG ADDON: CPT

## 2025-01-04 PROCEDURE — 25010000002 KETOROLAC TROMETHAMINE PER 15 MG: Performed by: NURSE PRACTITIONER

## 2025-01-04 PROCEDURE — 96374 THER/PROPH/DIAG INJ IV PUSH: CPT

## 2025-01-04 PROCEDURE — 25010000002 METOCLOPRAMIDE PER 10 MG: Performed by: NURSE PRACTITIONER

## 2025-01-04 PROCEDURE — 71045 X-RAY EXAM CHEST 1 VIEW: CPT

## 2025-01-04 RX ORDER — DIPHENHYDRAMINE HYDROCHLORIDE 50 MG/ML
25 INJECTION INTRAMUSCULAR; INTRAVENOUS ONCE
Status: COMPLETED | OUTPATIENT
Start: 2025-01-04 | End: 2025-01-04

## 2025-01-04 RX ORDER — BROMPHENIRAMINE MALEATE, PSEUDOEPHEDRINE HYDROCHLORIDE, AND DEXTROMETHORPHAN HYDROBROMIDE 2; 30; 10 MG/5ML; MG/5ML; MG/5ML
5 SYRUP ORAL 4 TIMES DAILY PRN
Qty: 118 ML | Refills: 0 | Status: SHIPPED | OUTPATIENT
Start: 2025-01-04

## 2025-01-04 RX ORDER — ONDANSETRON 4 MG/1
4 TABLET, ORALLY DISINTEGRATING ORAL 4 TIMES DAILY PRN
Qty: 20 TABLET | Refills: 0 | Status: SHIPPED | OUTPATIENT
Start: 2025-01-04

## 2025-01-04 RX ORDER — OSELTAMIVIR PHOSPHATE 75 MG/1
75 CAPSULE ORAL 2 TIMES DAILY
Qty: 10 CAPSULE | Refills: 0 | Status: SHIPPED | OUTPATIENT
Start: 2025-01-04 | End: 2025-01-09

## 2025-01-04 RX ORDER — KETOROLAC TROMETHAMINE 30 MG/ML
30 INJECTION, SOLUTION INTRAMUSCULAR; INTRAVENOUS ONCE
Status: COMPLETED | OUTPATIENT
Start: 2025-01-04 | End: 2025-01-04

## 2025-01-04 RX ORDER — METOCLOPRAMIDE HYDROCHLORIDE 5 MG/ML
10 INJECTION INTRAMUSCULAR; INTRAVENOUS ONCE
Status: COMPLETED | OUTPATIENT
Start: 2025-01-04 | End: 2025-01-04

## 2025-01-04 RX ADMIN — SODIUM CHLORIDE 500 ML: 9 INJECTION, SOLUTION INTRAVENOUS at 03:16

## 2025-01-04 RX ADMIN — DIPHENHYDRAMINE HYDROCHLORIDE 25 MG: 50 INJECTION, SOLUTION INTRAMUSCULAR; INTRAVENOUS at 03:13

## 2025-01-04 RX ADMIN — KETOROLAC TROMETHAMINE 30 MG: 30 INJECTION, SOLUTION INTRAMUSCULAR; INTRAVENOUS at 03:07

## 2025-01-04 RX ADMIN — METOCLOPRAMIDE 10 MG: 5 INJECTION, SOLUTION INTRAMUSCULAR; INTRAVENOUS at 03:09

## 2025-01-04 NOTE — ED PROVIDER NOTES
Time: 2:39 AM EST  Date of encounter:  1/3/2025  Independent Historian/Clinical History and Information was obtained by:   Patient    History is limited by: N/A    Chief Complaint: FEVER, COUGH, NAUSEA, HEADACHE      History of Present Illness:    The patient is a 64 y.o. year old female who presents to the emergency department for evaluation of cough, nausea, congestion, fever, and headache.  She states that all the symptoms started yesterday.  She states she has had nausea but no vomiting.  She reports fever and chills today.  She states that she is not normally prone to headaches.  She states that has been mostly posterior headache and has a grossly intact neuroexam.  She denies any recent falls or trauma.  She states that she had not been around anyone that been sick recently.  She denies any abdominal pain or tenderness with palpation.  On exam her breath sounds are clear.  Her airway is patent.  She is able to speak in full sentences.      Patient Care Team  Primary Care Provider: Portia Serna MD    Past Medical History:     Allergies   Allergen Reactions    Clindamycin Hives, Itching and Nausea And Vomiting    Erythromycin Hives, Itching, GI Intolerance and Unknown - High Severity     Past Medical History:   Diagnosis Date    Atypical ductal hyperplasia of right breast     Breast lump     Cancer     Chronic cough     Colon polyp     Constipation     Early satiety     Esophageal dysphagia     Fatigue     Hemidiaphragm paralysis     HTN (hypertension)     Hypothyroidism     Lateral epicondylitis of left elbow 03/14/2018    Lymphoma     OM (otitis media)     PONV (postoperative nausea and vomiting)     Rectal bleeding     Right knee pain 08/30/2018    Shortness of breath     Tear of PCL (posterior cruciate ligament) of knee, right, initial encounter 08/30/2018    Thymoma, malignant     Thyroid disorder     Vitamin B12 deficiency     Vitamin D deficiency      Past Surgical History:   Procedure  Laterality Date    BREAST LUMPECTOMY Right     COLONOSCOPY      Dr. Roque    COLONOSCOPY N/A 05/10/2024    Procedure: COLONOSCOPY WITH POLYPECTOMY;  Surgeon: Ayden Roque MD;  Location: [facility] ENDOSCOPY;  Service: Gastroenterology;  Laterality: N/A;  COLON POLYP    ENDOSCOPY  09/11/2018    Dr. Roque    HYSTERECTOMY  1994    STOMACH SURGERY      biopsy    TONSILLECTOMY  1967     Family History   Problem Relation Age of Onset    Breast cancer Mother     Heart disease Mother     Cancer Mother     Arthritis Mother     Heart disease Father     Arthritis Father     Diabetes Sister     Colon cancer Neg Hx        Home Medications:  Prior to Admission medications    Medication Sig Start Date End Date Taking? Authorizing Provider   ammonium lactate (AmLactin) 12 % lotion Apply  topically to the appropriate area as directed As Needed for Dry Skin. 11/15/24   Portia Serna MD   clotrimazole-betamethasone (Lotrisone) 1-0.05 % cream Apply 1 Application topically to the appropriate area as directed 2 (Two) Times a Day. 11/15/24   Portia Serna MD   fluconazole (Diflucan) 150 MG tablet Take 1 tablet by mouth Every 3 (Three) Days. 11/15/24   Portia Serna MD   levothyroxine (Synthroid) 50 MCG tablet Take 1 tablet by mouth Daily. 11/15/24   Portia Serna MD   nystatin (MYCOSTATIN) 517738 UNIT/GM powder Apply  topically to the appropriate area as directed 3 (Three) Times a Day. 11/15/24   Portia Srena MD   pravastatin (PRAVACHOL) 10 MG tablet Take 1 tablet by mouth Daily. 11/15/24   Portia Serna MD   triamterene-hydrochlorothiazide (MAXZIDE-25) 37.5-25 MG per tablet Take 0.5 tablets by mouth Daily. 11/15/24   Portia Serna MD   vitamin D (ERGOCALCIFEROL) 1.25 MG (10819 UT) capsule capsule Take 1 capsule by mouth Every 7 (Seven) Days. 11/15/24   Portia Serna MD        Social History:   Social History     Tobacco Use     "Smoking status: Never     Passive exposure: Never    Smokeless tobacco: Never   Vaping Use    Vaping status: Never Used   Substance Use Topics    Alcohol use: Never    Drug use: Never         Review of Systems:  Review of Systems   Constitutional:  Positive for activity change, appetite change, chills, fatigue and fever.   HENT:  Positive for congestion, rhinorrhea and trouble swallowing. Negative for sinus pressure, sinus pain and voice change.    Respiratory:  Positive for cough. Negative for shortness of breath and wheezing.    Cardiovascular:  Negative for chest pain.   Gastrointestinal:  Positive for nausea. Negative for abdominal pain, diarrhea and vomiting.   Genitourinary:  Negative for dysuria, frequency, pelvic pain and urgency.   Musculoskeletal:  Negative for back pain, neck pain and neck stiffness.   Skin:  Negative for rash.   Neurological:  Positive for headaches.        Physical Exam:  /73 (BP Location: Left arm, Patient Position: Sitting)   Pulse 102   Temp 98.2 °F (36.8 °C) (Oral)   Resp 19   Ht 165.1 cm (65\")   Wt 77.5 kg (170 lb 13.7 oz)   SpO2 96%   BMI 28.43 kg/m²     Physical Exam  Vitals and nursing note reviewed.   Constitutional:       General: She is not in acute distress.     Appearance: Normal appearance. She is not ill-appearing or toxic-appearing.   HENT:      Head: Normocephalic and atraumatic.      Nose: Congestion and rhinorrhea present.      Mouth/Throat:      Mouth: Mucous membranes are moist.   Eyes:      General: No scleral icterus.     Conjunctiva/sclera: Conjunctivae normal.      Pupils: Pupils are equal, round, and reactive to light.   Cardiovascular:      Rate and Rhythm: Normal rate and regular rhythm.      Pulses: Normal pulses.   Pulmonary:      Effort: Pulmonary effort is normal. No respiratory distress.      Breath sounds: Normal breath sounds. No wheezing.   Abdominal:      General: Abdomen is flat.      Palpations: Abdomen is soft.      Tenderness: There " is no abdominal tenderness. There is no guarding or rebound.   Musculoskeletal:         General: Normal range of motion.      Cervical back: Normal range of motion and neck supple. No rigidity or tenderness.   Lymphadenopathy:      Cervical: No cervical adenopathy.   Skin:     General: Skin is warm and dry.      Capillary Refill: Capillary refill takes less than 2 seconds.      Findings: No rash.   Neurological:      General: No focal deficit present.      Mental Status: She is alert and oriented to person, place, and time. Mental status is at baseline.   Psychiatric:         Mood and Affect: Mood normal.         Behavior: Behavior normal.          Medical Decision Making:      Comorbidities that affect care:    Atypical ductal hyperplasia of right breast, cancer, constipation, esophageal dysphagia, hemidiaphragm paralysis, hypothyroid, rectal bleeding, posterior cruciate ligament tear, malignant thymoma, B12 deficiency, otitis media, postoperative nausea and vomiting, breast lump, chronic cough, fatigue, hypertension, lateral epicondylitis of the left elbow, right knee pain, shortness of breath, colon polyps, lymphoma    External Notes reviewed:    Previous Clinic Note: 6-month checkup at 11/15/2024 in Dr. Serna office for primary hypertension and hypothyroidism      The following orders were placed and all results were independently analyzed by me:  Orders Placed This Encounter   Procedures    Blood Culture - Blood,    Blood Culture - Blood,    COVID-19, FLU A/B, RSV PCR 1 HR TAT - Swab, Nasopharynx    XR Chest 1 View    Comprehensive Metabolic Panel    Lactic Acid, Plasma    Slade Draw    CBC Auto Differential    Undress & Gown    Continuous Pulse Oximetry    Vital Signs    Vital Signs Recheck    Oxygen Therapy- Nasal Cannula; Titrate 1-6 LPM Per SpO2; 90 - 95%    Insert Peripheral IV    CBC & Differential    Green Top (Gel)    Lavender Top    Gold Top - SST    Light Blue Top       Medications Given in the  Emergency Department:  Medications   sodium chloride 0.9 % flush 10 mL (has no administration in time range)   sodium chloride 0.9 % bolus 500 mL (0 mL Intravenous Stopped 1/4/25 0356)   diphenhydrAMINE (BENADRYL) injection 25 mg (25 mg Intravenous Given 1/4/25 0313)   metoclopramide (REGLAN) injection 10 mg (10 mg Intravenous Given 1/4/25 0309)   ketorolac (TORADOL) injection 30 mg (30 mg Intravenous Given 1/4/25 0307)        ED Course:    ED Course as of 01/04/25 0400   Sat Jan 04, 2025   0350 The patient reports that her headache and symptoms have improved with medications here in the ER. [TC]      ED Course User Index  [TC] Naomi Rich APRN       Labs:    Lab Results (last 24 hours)       Procedure Component Value Units Date/Time    COVID-19, FLU A/B, RSV PCR 1 HR TAT - Swab, Nasopharynx [[id number]]  (Abnormal) Collected: 01/03/25 2349    Specimen: Swab from Nasopharynx Updated: 01/04/25 0040     COVID19 Not Detected     Influenza A PCR Detected     Influenza B PCR Not Detected     RSV, PCR Not Detected    Narrative:      Fact sheet for providers: https://www.fda.gov/media/738396/download    Fact sheet for patients: https://www.fda.gov/media/069679/download    Test performed by PCR.    CBC & Differential [[id number]]  (Abnormal) Collected: 01/03/25 2355    Specimen: Blood from Arm, Right Updated: 01/04/25 0002    Narrative:      The following orders were created for panel order CBC & Differential.  Procedure                               Abnormality         Status                     ---------                               -----------         ------                     CBC Auto Differential[[id number]]        Abnormal            Final result                 Please view results for these tests on the individual orders.    Comprehensive Metabolic Panel [[id number]]  (Abnormal) Collected: 01/03/25 2355    Specimen: Blood from Arm, Right Updated: 01/04/25 0021     Glucose 135 mg/dL      BUN 17 mg/dL      Creatinine  1.06 mg/dL      Sodium 138 mmol/L      Potassium 3.8 mmol/L      Chloride 102 mmol/L      CO2 25.3 mmol/L      Calcium 9.5 mg/dL      Total Protein 7.5 g/dL      Albumin 4.1 g/dL      ALT (SGPT) 13 U/L      AST (SGOT) 16 U/L      Alkaline Phosphatase 64 U/L      Total Bilirubin 0.4 mg/dL      Globulin 3.4 gm/dL      A/G Ratio 1.2 g/dL      BUN/Creatinine Ratio 16.0     Anion Gap 10.7 mmol/L      eGFR 59.5 mL/min/1.73     Narrative:      GFR Categories in Chronic Kidney Disease (CKD)      GFR Category          GFR (mL/min/1.73)    Interpretation  G1                     90 or greater         Normal or high (1)  G2                      60-89                Mild decrease (1)  G3a                   45-59                Mild to moderate decrease  G3b                   30-44                Moderate to severe decrease  G4                    15-29                Severe decrease  G5                    14 or less           Kidney failure          (1)In the absence of evidence of kidney disease, neither GFR category G1 or G2 fulfill the criteria for CKD.    eGFR calculation 2021 CKD-EPI creatinine equation, which does not include race as a factor    Lactic Acid, Plasma [[id number]]  (Normal) Collected: 01/03/25 2355    Specimen: Blood from Arm, Right Updated: 01/04/25 0019     Lactate 1.2 mmol/L     Blood Culture - Blood, Arm, Right [[id number]] Collected: 01/03/25 2355    Specimen: Blood from Arm, Right Updated: 01/03/25 2359    CBC Auto Differential [[id number]]  (Abnormal) Collected: 01/03/25 2355    Specimen: Blood from Arm, Right Updated: 01/04/25 0002     WBC 10.69 10*3/mm3      RBC 5.01 10*6/mm3      Hemoglobin 14.7 g/dL      Hematocrit 44.1 %      MCV 88.0 fL      MCH 29.3 pg      MCHC 33.3 g/dL      RDW 13.1 %      RDW-SD 42.2 fl      MPV 9.1 fL      Platelets 338 10*3/mm3      Neutrophil % 88.7 %      Lymphocyte % 4.1 %      Monocyte % 5.1 %      Eosinophil % 1.1 %      Basophil % 0.7 %      Immature Grans % 0.3 %       Neutrophils, Absolute 9.48 10*3/mm3      Lymphocytes, Absolute 0.44 10*3/mm3      Monocytes, Absolute 0.54 10*3/mm3      Eosinophils, Absolute 0.12 10*3/mm3      Basophils, Absolute 0.08 10*3/mm3      Immature Grans, Absolute 0.03 10*3/mm3      nRBC 0.0 /100 WBC     Blood Culture - Blood, Arm, Left [[id number]] Collected: 01/04/25 0216    Specimen: Blood from Arm, Left Updated: 01/04/25 0219             Imaging:    XR Chest 1 View    Result Date: 1/4/2025  XR CHEST 1 VW Date of Exam: 1/4/2025 2:44 AM EST Indication: COUGH/FEVER Comparison: 5/10/2019 chest x-ray, 10/4/2024 PET/CT Findings: Heart appears enlarged. Pulmonary vascularity is normal. There is chronic elevation of the left hemidiaphragm with some chronic scarring/atelectasis at the left base. Lungs are otherwise clear. No pneumothorax.     Chronic elevation of the left hemidiaphragm with some chronic scarring/atelectasis at the left base. No clearly acute findings in the chest. Electronically Signed: Jesus Spaulding MD  1/4/2025 2:52 AM EST  Workstation ID: [id number]       Differential Diagnosis and Discussion:    Cough: Differential diagnosis includes but is not limited to pneumonia, acute bronchitis, upper respiratory infection, ACE inhibitor use, allergic reaction, epiglottitis, seasonal allergies, chemical irritants, exercise-induced asthma, viral syndrome.  Fever: Based on the complaint of fever, differential diagnosis includes but is not limited to meningitis, pneumonia, pyelonephritis, acute uti,  systemic immune response syndrome, sepsis, viral syndrome, fungal infection, tick born illness and other bacterial infections.  Headache: Differential diagnosis includes but is not limited to migraine, cluster headache, hypertension, tumor, subarachnoid bleeding, pseudotumor cerebri, temporal arteritis, infections, tension headache, and TMJ syndrome.  Vomiting: Differential diagnosis includes but is not limited to migraine, labyrinthine disorders,  psychogenic, metabolic and endocrine causes, peptic ulcer, gastric outlet obstruction, gastritis, gastroenteritis, appendicitis, intestinal obstruction, paralytic ileus, food poisoning, cholecystitis, acute hepatitis, acute pancreatitis, acute febrile illness, and myocardial infarction.    PROCEDURES:    Labs were collected in the emergency department and all labs were reviewed and interpreted by me.  X-ray were performed in the emergency department and all X-ray impressions were independently interpreted by me.    No orders to display       Procedures    MDM  Number of Diagnoses or Management Options  Acute cough: new and requires workup  Acute non intractable tension-type headache: new and requires workup  Fever, unspecified fever cause: new and requires workup  Influenza A: new and requires workup     Amount and/or Complexity of Data Reviewed  Clinical lab tests: reviewed  Tests in the radiology section of CPT®: reviewed    Risk of Complications, Morbidity, and/or Mortality  Presenting problems: low         Patient Care Considerations:    ANTIBIOTICS: I considered prescribing antibiotics as an outpatient however no bacterial focus of infection was found.      Consultants/Shared Management Plan:    None    Social Determinants of Health:    Patient is independent, reliable, and has access to care.       Disposition and Care Coordination:    Discharged: The patient is suitable and stable for discharge with no need for consideration of admission.    I have explained the patient´s condition, diagnoses and treatment plan based on the information available to me at this time. I have answered questions and addressed any concerns. The patient has a good  understanding of the patient´s diagnosis, condition, and treatment plan as can be expected at this point. The vital signs have been stable. The patient´s condition is stable and appropriate for discharge from the emergency department.      The patient will pursue further  outpatient evaluation with the primary care physician or other designated or consulting physician as outlined in the discharge instructions. They are agreeable to this plan of care and follow-up instructions have been explained in detail. The patient has received these instructions in written format and has expressed an understanding of the discharge instructions. The patient is aware that any significant change in condition or worsening of symptoms should prompt an immediate return to this or the closest emergency department or call to 911.  I have explained discharge medications and the need for follow up with the patient/caretakers. This was also printed in the discharge instructions. Patient was discharged with the following medications and follow up:      Medication List        New Prescriptions      brompheniramine-pseudoephedrine-DM 30-2-10 MG/5ML syrup  Take 5 mL by mouth 4 (Four) Times a Day As Needed for Cough or Congestion.     ondansetron ODT 4 MG disintegrating tablet  Commonly known as: ZOFRAN-ODT  Place 1 tablet on the tongue 4 (Four) Times a Day As Needed for Nausea or Vomiting.     oseltamivir 75 MG capsule  Commonly known as: Tamiflu  Take 1 capsule by mouth 2 (Two) Times a Day for 5 days.               Where to Get Your Medications        These medications were sent to [facility]/pharmacy #[id number] - [place], [place] - [address] - [phone]  - [phone]  [address] [place] [address]      Hours: 24-hours Phone: [phone]   brompheniramine-pseudoephedrine-DM 30-2-10 MG/5ML syrup  ondansetron ODT 4 MG disintegrating tablet  oseltamivir 75 MG capsule      Portia Serna MD  [address] [place] [id number] [phone]    Call   FOR FOLLOW UP       Final diagnoses:   Influenza A   Acute cough   Fever, unspecified fever cause   Acute non intractable tension-type headache        ED Disposition       ED Disposition   Discharge    Condition   Stable    Comment    --               This medical record created using voice recognition software.             Naomi Rich, APRN  01/04/25 [id number]

## 2025-01-04 NOTE — DISCHARGE INSTRUCTIONS
Your test were not concerning today for any emergent condition.  However it did show that you were positive for flu A.  There is no antibiotics that treats a virus but she did request to have the Tamiflu sent to the pharmacy along with medications to help with your symptoms such as nausea vomiting and cough.  Continue to take over-the-counter acetaminophen and Motrin as needed for aches pains and fever.  Take your meds as prescribed.  Follow-up with Dr. Serna on Monday or Tuesday for reevaluation and to ensure that your symptoms are improving with rest, time, and medications.  Return to the emergency department immediately for any acutely developing respiratory distress, any persistent vomiting, any altered mental status or neurological symptoms or any new or worse concerns.

## 2025-01-09 LAB
BACTERIA SPEC AEROBE CULT: NORMAL
BACTERIA SPEC AEROBE CULT: NORMAL

## 2025-02-03 NOTE — ASSESSMENT & PLAN NOTE
-Due to worsening right ear pain and history of AOM, will treat with amoxicillin.   -Advised patient to start Flonase due to effusions of TM seen on physical exam   -Will refer to ENT do to history of re-current AOM, although patients symptoms would likely greatly improve with prolonged flonase use  -If symptoms fail to improve or get worse please follow up in the clinic    Dr Conrad discontinued gemfibrozil. Patient notified.

## 2025-02-21 ENCOUNTER — OFFICE VISIT (OUTPATIENT)
Dept: INTERNAL MEDICINE | Facility: CLINIC | Age: 65
End: 2025-02-21
Payer: MEDICARE

## 2025-02-21 VITALS
HEIGHT: 65 IN | HEART RATE: 110 BPM | BODY MASS INDEX: 29.52 KG/M2 | RESPIRATION RATE: 20 BRPM | WEIGHT: 177.2 LBS | DIASTOLIC BLOOD PRESSURE: 74 MMHG | TEMPERATURE: 98.3 F | SYSTOLIC BLOOD PRESSURE: 120 MMHG | OXYGEN SATURATION: 98 %

## 2025-02-21 DIAGNOSIS — R22.1 LOCALIZED SWELLING, MASS OR LUMP OF NECK: Primary | ICD-10-CM

## 2025-02-21 PROCEDURE — 3074F SYST BP LT 130 MM HG: CPT | Performed by: INTERNAL MEDICINE

## 2025-02-21 PROCEDURE — 99213 OFFICE O/P EST LOW 20 MIN: CPT | Performed by: INTERNAL MEDICINE

## 2025-02-21 PROCEDURE — 3078F DIAST BP <80 MM HG: CPT | Performed by: INTERNAL MEDICINE

## 2025-02-26 NOTE — PROGRESS NOTES
"Chief Complaint  lymph node swelling (Right sided on her neck. First noticed the swelling about mid January. )    Subjective      Oksana Smith is a 64 y.o. female who presents to [facility]     [facility] for evaluation of swelling on the right side of her neck.  It has been present for just over a month.  Has not changed in the time since she first noticed it.  She is concerned because she was recently diagnosed with lymphoma and is worried that it is spread.    Objective   Vital Signs:   Vitals:    02/21/25 1517   BP: 120/74   BP Location: Left arm   Patient Position: Sitting   Cuff Size: Large Adult   Pulse: 110   Resp: 20   Temp: 98.3 °F (36.8 °C)   TempSrc: Temporal   SpO2: 98%   Weight: 80.4 kg (177 lb 3.2 oz)   Height: 165.1 cm (65\")     Body mass index is 29.49 kg/m².    Wt Readings from Last 3 Encounters:   02/21/25 80.4 kg (177 lb 3.2 oz)   01/03/25 77.5 kg (170 lb 13.7 oz)   11/15/24 76 kg (167 lb 8 oz)     BP Readings from Last 3 Encounters:   02/21/25 120/74   01/04/25 139/73   11/15/24 112/74       Health Maintenance   Topic Date Due    Pneumococcal Vaccine 50+ (1 of 2 - PCV) Never done    ZOSTER VACCINE (1 of 2) Never done    COVID-19 Vaccine (10 - 2024-25 season) 10/10/2024    BMI FOLLOWUP  01/11/2025    ANNUAL WELLNESS VISIT  05/15/2025    MAMMOGRAM  04/08/2026    COLORECTAL CANCER SCREENING  05/10/2029    TDAP/TD VACCINES (8 - Td or Tdap) 02/01/2035    HEPATITIS C SCREENING  Completed    Orthopox/Monkeypox  Completed    INFLUENZA VACCINE  Completed       Physical Exam  Vitals reviewed.   Constitutional:       Appearance: Normal appearance. She is well-developed.   HENT:      Head: Normocephalic and atraumatic.      Mouth/Throat:      Pharynx: No oropharyngeal exudate.   Eyes:      Conjunctiva/sclera: Conjunctivae normal.      Pupils: Pupils are equal, round, and reactive to light.   Neck:      Thyroid: No thyromegaly or thyroid tenderness.      " Comments: Fullness on the right side of neck. No distinct lymph node palpated.   Cardiovascular:      Rate and Rhythm: Normal rate and regular rhythm.      Heart sounds: No murmur heard.     No friction rub. No gallop.   Pulmonary:      Effort: Pulmonary effort is normal.      Breath sounds: Normal breath sounds. No wheezing or rhonchi.   Skin:     General: Skin is warm and dry.   Neurological:      Mental Status: She is alert and oriented to person, place, and time.   Psychiatric:         Mood and Affect: Affect normal.          Result Review :  The following data was reviewed by: Portia Serna MD on 02/21/2025:         Procedures          Assessment & Plan  Localized swelling, mass or lump of neck  Will obtain ultrasound to further evaluate. Will determine further management based on results.   Orders:    US Head Neck Soft Tissue; Future         BMI is >= 25 and <30. (Overweight) The following options were offered after discussion;: exercise counseling/recommendations and nutrition counseling/recommendations         FOLLOW UP  Return for As needed.  Patient was given instructions and counseling regarding her condition or for health maintenance advice. Please see specific information pulled into the AVS if appropriate.       Portia Serna MD  02/26/25  10:18 EST    CURRENT & DISCONTINUED MEDICATIONS  Current Outpatient Medications   Medication Instructions    ammonium lactate (AmLactin) 12 % lotion Topical, As Needed    brompheniramine-pseudoephedrine-DM 30-2-10 MG/5ML syrup 5 mL, Oral, 4 Times Daily PRN    clotrimazole-betamethasone (Lotrisone) 1-0.05 % cream 1 Application, Topical, 2 Times Daily    levothyroxine (SYNTHROID) 50 mcg, Oral, Daily    nystatin (MYCOSTATIN) [id number] UNIT/GM powder Topical, 3 Times Daily    ondansetron ODT (ZOFRAN-ODT) 4 mg, Translingual, 4 Times Daily PRN    pravastatin (PRAVACHOL) 10 mg, Oral, Daily    triamterene-hydrochlorothiazide (MAXZIDE-25) 37.5-25 MG per  tablet 0.5 tablets, Oral, Daily    vitamin D (ERGOCALCIFEROL) 50,000 Units, Oral, Every 7 Days       There are no discontinued medications.

## 2025-03-03 ENCOUNTER — HOSPITAL ENCOUNTER (OUTPATIENT)
Dept: ULTRASOUND IMAGING | Facility: HOSPITAL | Age: 65
Discharge: HOME | End: 2025-03-03
Admitting: INTERNAL MEDICINE
Payer: MEDICARE

## 2025-03-03 DIAGNOSIS — R22.1 LOCALIZED SWELLING, MASS OR LUMP OF NECK: ICD-10-CM

## 2025-03-03 PROCEDURE — 76536 US EXAM OF HEAD AND NECK: CPT

## 2025-03-14 ENCOUNTER — OFFICE VISIT (OUTPATIENT)
Dept: ORTHOPEDIC SURGERY | Facility: CLINIC | Age: 65
End: 2025-03-14
Payer: MEDICARE

## 2025-03-14 VITALS
OXYGEN SATURATION: 95 % | WEIGHT: 165 LBS | DIASTOLIC BLOOD PRESSURE: 75 MMHG | BODY MASS INDEX: 27.49 KG/M2 | HEART RATE: 78 BPM | HEIGHT: 65 IN | SYSTOLIC BLOOD PRESSURE: 107 MMHG

## 2025-03-14 DIAGNOSIS — M25.562 LEFT KNEE PAIN, UNSPECIFIED CHRONICITY: Primary | ICD-10-CM

## 2025-03-14 DIAGNOSIS — M17.12 PRIMARY OSTEOARTHRITIS OF LEFT KNEE: ICD-10-CM

## 2025-03-14 RX ADMIN — TRIAMCINOLONE ACETONIDE 40 MG: 40 INJECTION, SUSPENSION INTRA-ARTICULAR; INTRAMUSCULAR at 10:06

## 2025-03-14 RX ADMIN — LIDOCAINE HYDROCHLORIDE 5 ML: 10 INJECTION, SOLUTION INFILTRATION; PERINEURAL at 10:06

## 2025-03-14 NOTE — PROGRESS NOTES
"Chief Complaint  Follow-up of the Left Knee     Subjective      Oksana Smith presents to [facility] for follow up of the left knee.  She has pain on the lateral aspect of the knee.  She has treated her osteo arthritis with injections in the past.  Her last injection was 8/13/24.  She has had no recent injury or fall.      Allergies   Allergen Reactions    Clindamycin Hives, Itching and Nausea And Vomiting    Erythromycin Hives, Itching, GI Intolerance and Unknown - High Severity    Oxycodone-Acetaminophen Other (See Comments)     hallucinations        Social History     Socioeconomic History    Marital status:    Tobacco Use    Smoking status: Never     Passive exposure: Never    Smokeless tobacco: Never   Vaping Use    Vaping status: Never Used   Substance and Sexual Activity    Alcohol use: Never    Drug use: Never    Sexual activity: Defer        I reviewed the patient's chief complaint, history of present illness, review of systems, past medical history, surgical history, family history, social history, medications, and allergy list.     Review of Systems     Constitutional: Denies fevers, chills, weight loss  Cardiovascular: Denies chest pain, shortness of breath  Skin: Denies rashes, acute skin changes  Neurologic: Denies headache, loss of consciousness  MSK: Left knee pain.       Vital Signs:   /75   Pulse 78   Ht 165.1 cm (65\")   Wt 74.8 kg (165 lb)   SpO2 95%   BMI 27.46 kg/m²            Ortho Exam    Physical Exam  General:Alert. No acute distress     LEFT KNEE  Skin is intact, no erythema, no ecchymosis, no swelling, no effusion, no signs of infection, full extension, flexion 120, stable anterior/posterior drawer, stable to varus/valgus stress. Nontender to palpation, no pain with range of motion. Negative Paula, Negative Lachman. Mild swelling.     Right knee injection: R knee  Date/Time: 3/14/2025 10:06 AM  Consent given by: patient  Site marked: " site marked  Timeout: Immediately prior to procedure a time out was called to verify the correct patient, procedure, equipment, support staff and site/side marked as required   Supporting Documentation  Indications: pain   Procedure Details  Location: knee - R knee  Needle gauge: 21g.  Medications administered: 5 mL lidocaine 1 %; 40 mg triamcinolone acetonide 40 MG/ML  Patient tolerance: patient tolerated the procedure well with no immediate complications      This injection documentation was Scribed for Wilberto Camarillo MD by No Trimble MA.  03/14/25   10:06 EDT    X-Ray Report:  Left knee X-Ray  Indication: Evaluation of the left knee  AP/Lateral and [place] view(s)  Findings: Moderate medial compartment changes with no fractures noted. No effusion.   Prior studies available for comparison: yes       Imaging Results (Most Recent)       Procedure Component Value Units Date/Time    XR Knee 3 View Left [[id number]] Resulted: 03/14/25 0939     Updated: 03/14/25 0944             Result Review :             Assessment and Plan     Diagnoses and all orders for this visit:    1. Left knee pain, unspecified chronicity (Primary)  -     XR Knee 3 View Left    2. Primary osteoarthritis of left knee    Other orders  -     right knee injection: R knee        Discussed the treatment plan with the patient. I reviewed the X-rays that were obtained today with the patient.     Discussed the risks and benefits of conservative measures. The patient expressed understanding and wished to proceed with a left knee steroid injection.  She tolerated the injection well.     Discussed with the patient that due to the steroid injection given today in the office they may see an increase in blood sugar for a few days. Advised patient to monitor sugar after receiving the injection.     Discussed possibility of a reaction from the injection.  Discussed the possibility that the injection may not completely improve or remove the pain.   Discussed the risk of infection.      Call or return if worsening symptoms.    Follow Up     PRN          Patient was given instructions and counseling regarding her condition or for health maintenance advice. Please see specific information pulled into the AVS if appropriate.     Transcribed for Wilberto Camarillo MD by Samantha Preston MA.  03/14/25   10:06 EDT    I have personally performed the services described in this document as scribed by the above individual and it is both accurate and complete. Wilberto Camarillo MD 03/15/25

## 2025-03-15 RX ORDER — LIDOCAINE HYDROCHLORIDE 10 MG/ML
5 INJECTION, SOLUTION INFILTRATION; PERINEURAL
Status: COMPLETED | OUTPATIENT
Start: 2025-03-14 | End: 2025-03-14

## 2025-03-15 RX ORDER — TRIAMCINOLONE ACETONIDE 40 MG/ML
40 INJECTION, SUSPENSION INTRA-ARTICULAR; INTRAMUSCULAR
Status: COMPLETED | OUTPATIENT
Start: 2025-03-14 | End: 2025-03-14

## 2025-03-17 ENCOUNTER — OFFICE VISIT (OUTPATIENT)
Dept: INTERNAL MEDICINE | Facility: CLINIC | Age: 65
End: 2025-03-17
Payer: MEDICARE

## 2025-03-17 VITALS
RESPIRATION RATE: 16 BRPM | SYSTOLIC BLOOD PRESSURE: 122 MMHG | HEIGHT: 65 IN | WEIGHT: 173 LBS | BODY MASS INDEX: 28.82 KG/M2 | HEART RATE: 84 BPM | OXYGEN SATURATION: 98 % | DIASTOLIC BLOOD PRESSURE: 78 MMHG | TEMPERATURE: 98.8 F

## 2025-03-17 DIAGNOSIS — H66.001 NON-RECURRENT ACUTE SUPPURATIVE OTITIS MEDIA OF RIGHT EAR WITHOUT SPONTANEOUS RUPTURE OF TYMPANIC MEMBRANE: Primary | ICD-10-CM

## 2025-03-17 PROCEDURE — 99213 OFFICE O/P EST LOW 20 MIN: CPT | Performed by: NURSE PRACTITIONER

## 2025-03-17 PROCEDURE — 3074F SYST BP LT 130 MM HG: CPT | Performed by: NURSE PRACTITIONER

## 2025-03-17 PROCEDURE — 3078F DIAST BP <80 MM HG: CPT | Performed by: NURSE PRACTITIONER

## 2025-03-17 RX ORDER — AMOXICILLIN 875 MG/1
875 TABLET, COATED ORAL 2 TIMES DAILY
Qty: 20 TABLET | Refills: 0 | Status: SHIPPED | OUTPATIENT
Start: 2025-03-17 | End: 2025-03-27

## 2025-03-17 NOTE — PROGRESS NOTES
"Chief Complaint  Sinus Problem (Facial pressure, teeth pain on right side- started yesterday/worsened today) and Nasal drainage (PND- 2 days )      Subjective      History of Present Illness  The patient is a 64-year-old female presenting with sinus pressure and nasal congestion that commenced yesterday and has progressively worsened overnight. She reports dentalgia localized primarily to the right side. There is no history of recent allergy exacerbation, systemic symptoms, exposure to sick contacts, or respiratory symptoms. Additionally, she has not undergone recent COVID-19 or influenza testing. The patient notes mild otalgia but has not taken any sinus or allergy medications.          Objective   Vital Signs:   Vitals:    03/17/25 1321   BP: 122/78   BP Location: Right arm   Patient Position: Sitting   Cuff Size: Adult   Pulse: 84   Resp: 16   Temp: 98.8 °F (37.1 °C)   TempSrc: Temporal   SpO2: 98%   Weight: 78.5 kg (173 lb)   Height: 165.1 cm (65\")     Body mass index is 28.79 kg/m².    Wt Readings from Last 3 Encounters:   03/17/25 78.5 kg (173 lb)   03/14/25 74.8 kg (165 lb)   02/21/25 80.4 kg (177 lb 3.2 oz)     BP Readings from Last 3 Encounters:   03/17/25 122/78   03/14/25 107/75   02/21/25 120/74       Health Maintenance   Topic Date Due    Pneumococcal Vaccine 50+ (1 of 2 - PCV) Never done    ZOSTER VACCINE (1 of 2) Never done    COVID-19 Vaccine (10 - 2024-25 season) 10/10/2024    ANNUAL WELLNESS VISIT  05/15/2025    BMI FOLLOWUP  02/21/2026    MAMMOGRAM  04/08/2026    COLORECTAL CANCER SCREENING  05/10/2029    TDAP/TD VACCINES (8 - Td or Tdap) 02/01/2035    HEPATITIS C SCREENING  Completed    Orthopox/Monkeypox  Completed    INFLUENZA VACCINE  Completed       Physical Exam  Vitals and nursing note reviewed.   Constitutional:       General: She is not in acute distress.     Appearance: Normal appearance.   HENT:      Head: Normocephalic and atraumatic.      Right Ear: External ear normal.      Left Ear: " Tympanic membrane, ear canal and external ear normal.      Ears:      Comments: Right tm erythematous, dull     Nose: Nose normal.      Mouth/Throat:      Mouth: Mucous membranes are moist.   Eyes:      Conjunctiva/sclera: Conjunctivae normal.   Cardiovascular:      Rate and Rhythm: Normal rate and regular rhythm.      Pulses: Normal pulses.      Heart sounds: Normal heart sounds. No murmur heard.     No friction rub. No gallop.   Pulmonary:      Effort: Pulmonary effort is normal. No respiratory distress.      Breath sounds: No wheezing, rhonchi or rales.   Musculoskeletal:      Cervical back: Neck supple.      Right lower leg: No edema.      Left lower leg: No edema.   Skin:     General: Skin is warm and dry.   Neurological:      General: No focal deficit present.      Mental Status: She is alert and oriented to person, place, and time.   Psychiatric:         Mood and Affect: Mood normal.         Behavior: Behavior normal.        Physical Exam        Result Review :  The following data was reviewed by: ISAIAS Mora on 03/17/2025:         Results              Procedures            Assessment & Plan  Non-recurrent acute suppurative otitis media of right ear without spontaneous rupture of tympanic membrane              Assessment & Plan  1. Sinusitis  - Symptoms suggest viral etiology or potential allergic reaction  - Ear infection may contribute to discomfort  - Rapid onset makes full-blown sinus infection unlikely  - COVID-19 or influenza less likely  - Recommend OTC medications (NyQuil, DayQuil, Mucinex) for symptom relief and drainage  - Advised to consume yogurt while on amoxicillin  - Prescribed amoxicillin 500 mg BID for 10 days, sent to [facility]    Patient or patient representative verbalized consent for the use of Ambient Listening during the visit with  ISAIAS Mora for chart documentation. 3/17/2025  14:17 EDT      FOLLOW UP  No follow-ups on file.  Patient was given  instructions and counseling regarding her condition or for health maintenance advice. Please see specific information pulled into the AVS if appropriate.     Sil GABRIEL Banegas, APRN  03/17/25  14:17 EDT    CURRENT & DISCONTINUED MEDICATIONS  Current Outpatient Medications   Medication Instructions    ammonium lactate (AmLactin) 12 % lotion Topical, As Needed    amoxicillin (AMOXIL) 875 mg, Oral, 2 Times Daily    clotrimazole-betamethasone (Lotrisone) 1-0.05 % cream 1 Application, Topical, 2 Times Daily    levothyroxine (SYNTHROID) 50 mcg, Oral, Daily    nystatin (MYCOSTATIN) 505519 UNIT/GM powder Topical, 3 Times Daily    pravastatin (PRAVACHOL) 10 mg, Oral, Daily    triamterene-hydrochlorothiazide (MAXZIDE-25) 37.5-25 MG per tablet 0.5 tablets, Oral, Daily    vitamin D (ERGOCALCIFEROL) 50,000 Units, Oral, Every 7 Days       There are no discontinued medications.

## 2025-05-08 ENCOUNTER — CLINICAL SUPPORT (OUTPATIENT)
Dept: INTERNAL MEDICINE | Facility: CLINIC | Age: 65
End: 2025-05-08
Payer: MEDICARE

## 2025-05-08 DIAGNOSIS — E55.9 VITAMIN D DEFICIENCY: ICD-10-CM

## 2025-05-08 DIAGNOSIS — E53.8 LOW SERUM VITAMIN B12: ICD-10-CM

## 2025-05-08 DIAGNOSIS — E03.9 HYPOTHYROIDISM, UNSPECIFIED TYPE: ICD-10-CM

## 2025-05-08 DIAGNOSIS — R79.89 LOW VITAMIN D LEVEL: ICD-10-CM

## 2025-05-08 DIAGNOSIS — R79.9 ABNORMAL FINDING OF BLOOD CHEMISTRY, UNSPECIFIED: ICD-10-CM

## 2025-05-08 DIAGNOSIS — I10 PRIMARY HYPERTENSION: ICD-10-CM

## 2025-05-08 DIAGNOSIS — E53.8 VITAMIN B12 DEFICIENCY: Primary | ICD-10-CM

## 2025-05-08 LAB
25(OH)D3 SERPL-MCNC: 41.3 NG/ML (ref 30–100)
ALBUMIN SERPL-MCNC: 4.1 G/DL (ref 3.5–5.2)
ALBUMIN/GLOB SERPL: 1.4 G/DL
ALP SERPL-CCNC: 61 U/L (ref 39–117)
ALT SERPL W P-5'-P-CCNC: 11 U/L (ref 1–33)
ANION GAP SERPL CALCULATED.3IONS-SCNC: 7.7 MMOL/L (ref 5–15)
AST SERPL-CCNC: 14 U/L (ref 1–32)
BASOPHILS # BLD AUTO: 0.08 10*3/MM3 (ref 0–0.2)
BASOPHILS NFR BLD AUTO: 1.1 % (ref 0–1.5)
BILIRUB SERPL-MCNC: 0.5 MG/DL (ref 0–1.2)
BUN SERPL-MCNC: 19 MG/DL (ref 8–23)
BUN/CREAT SERPL: 18.1 (ref 7–25)
CALCIUM SPEC-SCNC: 9.5 MG/DL (ref 8.6–10.5)
CHLORIDE SERPL-SCNC: 101 MMOL/L (ref 98–107)
CHOLEST SERPL-MCNC: 244 MG/DL (ref 0–200)
CO2 SERPL-SCNC: 29.3 MMOL/L (ref 22–29)
CREAT SERPL-MCNC: 1.05 MG/DL (ref 0.57–1)
DEPRECATED RDW RBC AUTO: 42.3 FL (ref 37–54)
EGFRCR SERPLBLD CKD-EPI 2021: 60.2 ML/MIN/1.73
EOSINOPHIL # BLD AUTO: 0.24 10*3/MM3 (ref 0–0.4)
EOSINOPHIL NFR BLD AUTO: 3.3 % (ref 0.3–6.2)
ERYTHROCYTE [DISTWIDTH] IN BLOOD BY AUTOMATED COUNT: 12.7 % (ref 12.3–15.4)
GLOBULIN UR ELPH-MCNC: 3 GM/DL
GLUCOSE SERPL-MCNC: 90 MG/DL (ref 65–99)
HBA1C MFR BLD: 5.5 % (ref 4.8–5.6)
HCT VFR BLD AUTO: 43.8 % (ref 34–46.6)
HDLC SERPL-MCNC: 57 MG/DL (ref 40–60)
HGB BLD-MCNC: 14.7 G/DL (ref 12–15.9)
IMM GRANULOCYTES # BLD AUTO: 0.02 10*3/MM3 (ref 0–0.05)
IMM GRANULOCYTES NFR BLD AUTO: 0.3 % (ref 0–0.5)
LDLC SERPL CALC-MCNC: 168 MG/DL (ref 0–100)
LDLC/HDLC SERPL: 2.91 {RATIO}
LYMPHOCYTES # BLD AUTO: 2.05 10*3/MM3 (ref 0.7–3.1)
LYMPHOCYTES NFR BLD AUTO: 28.5 % (ref 19.6–45.3)
MCH RBC QN AUTO: 30.8 PG (ref 26.6–33)
MCHC RBC AUTO-ENTMCNC: 33.6 G/DL (ref 31.5–35.7)
MCV RBC AUTO: 91.8 FL (ref 79–97)
MONOCYTES # BLD AUTO: 0.51 10*3/MM3 (ref 0.1–0.9)
MONOCYTES NFR BLD AUTO: 7.1 % (ref 5–12)
NEUTROPHILS NFR BLD AUTO: 4.29 10*3/MM3 (ref 1.7–7)
NEUTROPHILS NFR BLD AUTO: 59.7 % (ref 42.7–76)
NRBC BLD AUTO-RTO: 0 /100 WBC (ref 0–0.2)
PLATELET # BLD AUTO: 367 10*3/MM3 (ref 140–450)
PMV BLD AUTO: 9.7 FL (ref 6–12)
POTASSIUM SERPL-SCNC: 3.9 MMOL/L (ref 3.5–5.2)
PROT SERPL-MCNC: 7.1 G/DL (ref 6–8.5)
RBC # BLD AUTO: 4.77 10*6/MM3 (ref 3.77–5.28)
SODIUM SERPL-SCNC: 138 MMOL/L (ref 136–145)
TRIGL SERPL-MCNC: 105 MG/DL (ref 0–150)
TSH SERPL DL<=0.05 MIU/L-ACNC: 2.74 UIU/ML (ref 0.27–4.2)
VLDLC SERPL-MCNC: 19 MG/DL (ref 5–40)
WBC NRBC COR # BLD AUTO: 7.19 10*3/MM3 (ref 3.4–10.8)

## 2025-05-08 PROCEDURE — 85025 COMPLETE CBC W/AUTO DIFF WBC: CPT | Performed by: INTERNAL MEDICINE

## 2025-05-08 PROCEDURE — 83036 HEMOGLOBIN GLYCOSYLATED A1C: CPT | Performed by: INTERNAL MEDICINE

## 2025-05-08 PROCEDURE — 84443 ASSAY THYROID STIM HORMONE: CPT | Performed by: INTERNAL MEDICINE

## 2025-05-08 PROCEDURE — 80061 LIPID PANEL: CPT | Performed by: INTERNAL MEDICINE

## 2025-05-08 PROCEDURE — 80053 COMPREHEN METABOLIC PANEL: CPT | Performed by: INTERNAL MEDICINE

## 2025-05-08 PROCEDURE — 36415 COLL VENOUS BLD VENIPUNCTURE: CPT | Performed by: INTERNAL MEDICINE

## 2025-05-08 PROCEDURE — 82306 VITAMIN D 25 HYDROXY: CPT | Performed by: INTERNAL MEDICINE

## 2025-05-08 NOTE — PROGRESS NOTES
Venipuncture Blood Specimen Collection  Venipuncture performed in [facility] by Debbi Olivares RN with good hemostasis. Patient tolerated the procedure well without complications.   05/08/25   Debbi Olivares RN

## 2025-05-15 ENCOUNTER — OFFICE VISIT (OUTPATIENT)
Dept: INTERNAL MEDICINE | Facility: CLINIC | Age: 65
End: 2025-05-15
Payer: MEDICARE

## 2025-05-15 VITALS
HEIGHT: 65 IN | TEMPERATURE: 97.5 F | SYSTOLIC BLOOD PRESSURE: 118 MMHG | RESPIRATION RATE: 18 BRPM | OXYGEN SATURATION: 98 % | HEART RATE: 80 BPM | DIASTOLIC BLOOD PRESSURE: 68 MMHG | BODY MASS INDEX: 29.16 KG/M2 | WEIGHT: 175 LBS

## 2025-05-15 DIAGNOSIS — E03.9 HYPOTHYROIDISM, UNSPECIFIED TYPE: ICD-10-CM

## 2025-05-15 DIAGNOSIS — I10 PRIMARY HYPERTENSION: ICD-10-CM

## 2025-05-15 DIAGNOSIS — E78.00 PURE HYPERCHOLESTEROLEMIA: ICD-10-CM

## 2025-05-15 DIAGNOSIS — R79.9 ABNORMAL FINDING OF BLOOD CHEMISTRY, UNSPECIFIED: ICD-10-CM

## 2025-05-15 DIAGNOSIS — Z00.00 ENCOUNTER FOR SUBSEQUENT ANNUAL WELLNESS VISIT (AWV) IN MEDICARE PATIENT: Primary | ICD-10-CM

## 2025-05-15 PROBLEM — E78.5 HYPERLIPIDEMIA: Status: ACTIVE | Noted: 2025-05-15

## 2025-05-15 RX ORDER — TRIAMTERENE AND HYDROCHLOROTHIAZIDE 37.5; 25 MG/1; MG/1
0.5 TABLET ORAL DAILY
Qty: 45 TABLET | Refills: 1 | Status: SHIPPED | OUTPATIENT
Start: 2025-05-15

## 2025-05-15 NOTE — ASSESSMENT & PLAN NOTE
Has not started Statin medication yet. Has been working on lifestyle changes. LDL decreased from previous with these measures. Will continue this until next follow up when we will recheck labs.

## 2025-05-15 NOTE — PROGRESS NOTES
Subjective   The ABCs of the Annual Wellness Visit  Medicare Wellness Visit      Oksana Smith is a 64 y.o. patient who presents for a Medicare Wellness Visit.    The following portions of the patient's history were reviewed and   updated as appropriate: allergies, current medications, past family history, past medical history, past social history, past surgical history, and problem list.    Compared to one year ago, the patient's physical   health is the same.  Compared to one year ago, the patient's mental   health is the same.    Recent Hospitalizations:  She was not admitted to the hospital during the last year.     Current Medical Providers:  Patient Care Team:  Portia Serna MD as PCP - General (Pediatrics)    Outpatient Medications Prior to Visit   Medication Sig Dispense Refill    ammonium lactate (AmLactin) 12 % lotion Apply  topically to the appropriate area as directed As Needed for Dry Skin. 225 g 1    clotrimazole-betamethasone (Lotrisone) 1-0.05 % cream Apply 1 Application topically to the appropriate area as directed 2 (Two) Times a Day. 45 g 1    nystatin (MYCOSTATIN) 166427 UNIT/GM powder Apply  topically to the appropriate area as directed 3 (Three) Times a Day. 60 g 2    vitamin D (ERGOCALCIFEROL) 1.25 MG (68559 UT) capsule capsule Take 1 capsule by mouth Every 7 (Seven) Days. 12 capsule 3    triamterene-hydrochlorothiazide (MAXZIDE-25) 37.5-25 MG per tablet Take 0.5 tablets by mouth Daily. 45 tablet 1    levothyroxine (Synthroid) 50 MCG tablet Take 1 tablet by mouth Daily. (Patient not taking: Reported on 5/15/2025) 90 tablet 1    pravastatin (PRAVACHOL) 10 MG tablet Take 1 tablet by mouth Daily. (Patient not taking: Reported on 5/15/2025) 90 tablet 1     No facility-administered medications prior to visit.     No opioid medication identified on active medication list. I have reviewed chart for other potential  high risk medication/s and harmful drug interactions in the  "elderly.      Aspirin is not on active medication list.  Aspirin use is not indicated based on review of current medical condition/s. Risk of harm outweighs potential benefits.  .    Patient Active Problem List   Diagnosis    Atypical ductal hyperplasia of breast    Thymoma    Chronic cough    Constipation    Disorder of diaphragm    Early satiety    Edema of lower extremity    Esophageal dysphagia    Fatigue    HTN (hypertension)    Hypothyroidism    Right knee pain    Rupture of patellar tendon    Vitamin B12 deficiency    Vitamin D deficiency    Acute otitis media    Cough    COVID-19 virus infection    S/P lumpectomy, right breast    Shortness of breath    Rectal bleeding    Malignant neoplasm of thymus    Low vitamin D level    Low serum vitamin B12    Lateral epicondylitis    History of tonsillectomy    Edema of lower extremity    Bilateral chronic serous otitis media    History of colon polyps    Bodies, loose, joint, knee, left    Primary osteoarthritis of left knee    Impaired fasting glucose    Follicular lymphoma of intra-abdominal lymph nodes     Advance Care Planning Advance Directive is not on file.  ACP discussion was held with the patient during this visit. Patient does not have an advance directive, information provided.            Objective   Vitals:    05/15/25 0831   BP: 118/68   BP Location: Left arm   Patient Position: Sitting   Cuff Size: Adult   Pulse: 80   Resp: 18   Temp: 97.5 °F (36.4 °C)   TempSrc: Temporal   SpO2: 98%   Weight: 79.4 kg (175 lb)   Height: 165.1 cm (65\")   PainSc: 0-No pain       Estimated body mass index is 29.12 kg/m² as calculated from the following:    Height as of this encounter: 165.1 cm (65\").    Weight as of this encounter: 79.4 kg (175 lb).                Does the patient have evidence of cognitive impairment? No  Lab Results   Component Value Date    TRIG 105 05/08/2025    HDL 57 05/08/2025     (H) 05/08/2025    VLDL 19 05/08/2025    HGBA1C 5.50 05/08/2025 "                                                                                               Health  Risk Assessment    Smoking Status:  Social History     Tobacco Use   Smoking Status Never    Passive exposure: Never   Smokeless Tobacco Never     Alcohol Consumption:  Social History     Substance and Sexual Activity   Alcohol Use Never       Fall Risk Screen  STEADI Fall Risk Assessment was completed, and patient is at LOW risk for falls.Assessment completed on:5/15/2025    Depression Screening   Little interest or pleasure in doing things? Not at all   Feeling down, depressed, or hopeless? Not at all   PHQ-2 Total Score 0      Health Habits and Functional and Cognitive Screenin/15/2025     8:41 AM   Functional & Cognitive Status   Do you have difficulty preparing food and eating? No   Do you have difficulty bathing yourself, getting dressed or grooming yourself? No   Do you have difficulty using the toilet? No   Do you have difficulty moving around from place to place? No   Do you have trouble with steps or getting out of a bed or a chair? No   Current Diet Well Balanced Diet   Dental Exam Up to date   Eye Exam Up to date   Exercise (times per week) 3 times per week   Current Exercises Include Walking   Do you need help using the phone?  No   Are you deaf or do you have serious difficulty hearing?  No   Do you need help to go to places out of walking distance? No   Do you need help shopping? No   Do you need help preparing meals?  No   Do you need help with housework?  No   Do you need help with laundry? No   Do you need help taking your medications? No   Do you need help managing money? No   Do you ever drive or ride in a car without wearing a seat belt? No   Have you felt unusual stress, anger or loneliness in the last month? No   Who do you live with? Spouse   If you need help, do you have trouble finding someone available to you? No   Do you have difficulty concentrating, remembering or making  decisions? No           Age-appropriate Screening Schedule:  Refer to the list below for future screening recommendations based on patient's age, sex and/or medical conditions. Orders for these recommended tests are listed in the plan section. The patient has been provided with a written plan.    Health Maintenance List  Health Maintenance   Topic Date Due    Pneumococcal Vaccine 50+ (1 of 2 - PCV) Never done    ZOSTER VACCINE (1 of 2) Never done    COVID-19 Vaccine (10 - 2024-25 season) 10/10/2024    ANNUAL WELLNESS VISIT  05/15/2025    INFLUENZA VACCINE  07/01/2025    MAMMOGRAM  04/09/2027    COLORECTAL CANCER SCREENING  05/10/2029    TDAP/TD VACCINES (8 - Td or Tdap) 02/01/2035    HEPATITIS C SCREENING  Completed    Orthopox/Monkeypox  Completed                                                                                                                                                CMS Preventative Services Quick Reference  Risk Factors Identified During Encounter  Immunizations Discussed/Encouraged: Prevnar 20 (Pneumococcal 20-valent conjugate) and Shingrix    The above risks/problems have been discussed with the patient.  Pertinent information has been shared with the patient in the After Visit Summary.  An After Visit Summary and PPPS were made available to the patient.    Follow Up:   Next Medicare Wellness visit to be scheduled in 1 year.     Assessment & Plan  Encounter for subsequent annual wellness visit (AWV) in Medicare patient  Screening labs reviewed/ordered  Counseling provided regarding age appropriate screenings and immunizations, healthy diet and exercise.        Hypothyroidism, unspecified type  Not taking Synthroid currently. TSH level normal. Will hold off on restarting medication for now. Repeat labs in 6 months.   Orders:    CBC & Differential; Future    Comprehensive Metabolic Panel; Future    Hemoglobin A1c; Future    Lipid Panel; Future    TSH Rfx On Abnormal To Free T4; Future    Pure  hypercholesterolemia  Has not started Statin medication yet. Has been working on lifestyle changes. LDL decreased from previous with these measures. Will continue this until next follow up when we will recheck labs.          Primary hypertension  Well controlled in clinic today  Continue current management    Orders:    CBC & Differential; Future    Comprehensive Metabolic Panel; Future    Hemoglobin A1c; Future    Lipid Panel; Future    TSH Rfx On Abnormal To Free T4; Future    Abnormal finding of blood chemistry, unspecified    Orders:    Hemoglobin A1c; Future         Follow Up:   Return in about 6 months (around 11/15/2025) for Next scheduled follow up.

## 2025-05-15 NOTE — ASSESSMENT & PLAN NOTE
Not taking Synthroid currently. TSH level normal. Will hold off on restarting medication for now. Repeat labs in 6 months.   Orders:    CBC & Differential; Future    Comprehensive Metabolic Panel; Future    Hemoglobin A1c; Future    Lipid Panel; Future    TSH Rfx On Abnormal To Free T4; Future

## 2025-05-30 ENCOUNTER — HOSPITAL ENCOUNTER (EMERGENCY)
Facility: HOSPITAL | Age: 65
Discharge: HOME | End: 2025-05-30
Attending: EMERGENCY MEDICINE
Payer: MEDICARE

## 2025-05-30 ENCOUNTER — APPOINTMENT (OUTPATIENT)
Dept: GENERAL RADIOLOGY | Facility: HOSPITAL | Age: 65
End: 2025-05-30
Payer: MEDICARE

## 2025-05-30 VITALS
SYSTOLIC BLOOD PRESSURE: 117 MMHG | TEMPERATURE: 99 F | HEIGHT: 65 IN | DIASTOLIC BLOOD PRESSURE: 75 MMHG | HEART RATE: 99 BPM | RESPIRATION RATE: 17 BRPM | WEIGHT: 173.28 LBS | BODY MASS INDEX: 28.87 KG/M2 | OXYGEN SATURATION: 90 %

## 2025-05-30 DIAGNOSIS — R51.9 ACUTE NONINTRACTABLE HEADACHE, UNSPECIFIED HEADACHE TYPE: Primary | ICD-10-CM

## 2025-05-30 LAB
ALBUMIN SERPL-MCNC: 3.6 G/DL (ref 3.5–5.2)
ALBUMIN/GLOB SERPL: 1.1 G/DL
ALP SERPL-CCNC: 70 U/L (ref 39–117)
ALT SERPL W P-5'-P-CCNC: 9 U/L (ref 1–33)
ANION GAP SERPL CALCULATED.3IONS-SCNC: 9.9 MMOL/L (ref 5–15)
AST SERPL-CCNC: 11 U/L (ref 1–32)
BACTERIA UR QL AUTO: ABNORMAL /HPF
BASOPHILS # BLD AUTO: 0.1 10*3/MM3 (ref 0–0.2)
BASOPHILS NFR BLD AUTO: 0.7 % (ref 0–1.5)
BILIRUB SERPL-MCNC: 0.8 MG/DL (ref 0–1.2)
BILIRUB UR QL STRIP: NEGATIVE
BUN SERPL-MCNC: 11.6 MG/DL (ref 8–23)
BUN/CREAT SERPL: 10.9 (ref 7–25)
CALCIUM SPEC-SCNC: 8.5 MG/DL (ref 8.6–10.5)
CHLORIDE SERPL-SCNC: 99 MMOL/L (ref 98–107)
CLARITY UR: CLEAR
CO2 SERPL-SCNC: 25.1 MMOL/L (ref 22–29)
COLOR UR: YELLOW
CREAT SERPL-MCNC: 1.06 MG/DL (ref 0.57–1)
D-LACTATE SERPL-SCNC: 0.6 MMOL/L (ref 0.5–2)
DEPRECATED RDW RBC AUTO: 45.3 FL (ref 37–54)
EGFRCR SERPLBLD CKD-EPI 2021: 59.5 ML/MIN/1.73
EOSINOPHIL # BLD AUTO: 0.08 10*3/MM3 (ref 0–0.4)
EOSINOPHIL NFR BLD AUTO: 0.6 % (ref 0.3–6.2)
ERYTHROCYTE [DISTWIDTH] IN BLOOD BY AUTOMATED COUNT: 13.3 % (ref 12.3–15.4)
FLUAV RNA RESP QL NAA+PROBE: NOT DETECTED
FLUBV RNA RESP QL NAA+PROBE: NOT DETECTED
GLOBULIN UR ELPH-MCNC: 3.2 GM/DL
GLUCOSE SERPL-MCNC: 103 MG/DL (ref 65–99)
GLUCOSE UR STRIP-MCNC: NEGATIVE MG/DL
HCT VFR BLD AUTO: 46.5 % (ref 34–46.6)
HGB BLD-MCNC: 15.5 G/DL (ref 12–15.9)
HGB UR QL STRIP.AUTO: ABNORMAL
HOLD SPECIMEN: NORMAL
HOLD SPECIMEN: NORMAL
HYALINE CASTS UR QL AUTO: ABNORMAL /LPF
IMM GRANULOCYTES # BLD AUTO: 0.07 10*3/MM3 (ref 0–0.05)
IMM GRANULOCYTES NFR BLD AUTO: 0.5 % (ref 0–0.5)
KETONES UR QL STRIP: NEGATIVE
LEUKOCYTE ESTERASE UR QL STRIP.AUTO: ABNORMAL
LYMPHOCYTES # BLD AUTO: 1.81 10*3/MM3 (ref 0.7–3.1)
LYMPHOCYTES NFR BLD AUTO: 12.5 % (ref 19.6–45.3)
MCH RBC QN AUTO: 30.8 PG (ref 26.6–33)
MCHC RBC AUTO-ENTMCNC: 33.3 G/DL (ref 31.5–35.7)
MCV RBC AUTO: 92.4 FL (ref 79–97)
MONOCYTES # BLD AUTO: 1.13 10*3/MM3 (ref 0.1–0.9)
MONOCYTES NFR BLD AUTO: 7.8 % (ref 5–12)
NEUTROPHILS NFR BLD AUTO: 11.27 10*3/MM3 (ref 1.7–7)
NEUTROPHILS NFR BLD AUTO: 77.9 % (ref 42.7–76)
NITRITE UR QL STRIP: NEGATIVE
NRBC BLD AUTO-RTO: 0 /100 WBC (ref 0–0.2)
PH UR STRIP.AUTO: 8 [PH] (ref 5–8)
PLATELET # BLD AUTO: 355 10*3/MM3 (ref 140–450)
PMV BLD AUTO: 9.4 FL (ref 6–12)
POTASSIUM SERPL-SCNC: 3.5 MMOL/L (ref 3.5–5.2)
PROT SERPL-MCNC: 6.8 G/DL (ref 6–8.5)
PROT UR QL STRIP: ABNORMAL
RBC # BLD AUTO: 5.03 10*6/MM3 (ref 3.77–5.28)
RBC # UR STRIP: ABNORMAL /HPF
RBC MORPH BLD: NORMAL
REF LAB TEST METHOD: ABNORMAL
RSV RNA RESP QL NAA+PROBE: NOT DETECTED
SARS-COV-2 RNA RESP QL NAA+PROBE: NOT DETECTED
SMALL PLATELETS BLD QL SMEAR: ADEQUATE
SODIUM SERPL-SCNC: 134 MMOL/L (ref 136–145)
SP GR UR STRIP: 1.01 (ref 1–1.03)
SQUAMOUS #/AREA URNS HPF: ABNORMAL /HPF
UROBILINOGEN UR QL STRIP: ABNORMAL
WBC # UR STRIP: ABNORMAL /HPF
WBC MORPH BLD: NORMAL
WBC NRBC COR # BLD AUTO: 14.46 10*3/MM3 (ref 3.4–10.8)
WHOLE BLOOD HOLD COAG: NORMAL
WHOLE BLOOD HOLD SPECIMEN: NORMAL

## 2025-05-30 PROCEDURE — 85007 BL SMEAR W/DIFF WBC COUNT: CPT | Performed by: EMERGENCY MEDICINE

## 2025-05-30 PROCEDURE — 71045 X-RAY EXAM CHEST 1 VIEW: CPT

## 2025-05-30 PROCEDURE — 99283 EMERGENCY DEPT VISIT LOW MDM: CPT

## 2025-05-30 PROCEDURE — 96374 THER/PROPH/DIAG INJ IV PUSH: CPT

## 2025-05-30 PROCEDURE — 25010000002 KETOROLAC TROMETHAMINE PER 15 MG

## 2025-05-30 PROCEDURE — 80053 COMPREHEN METABOLIC PANEL: CPT | Performed by: EMERGENCY MEDICINE

## 2025-05-30 PROCEDURE — 25010000002 DIPHENHYDRAMINE PER 50 MG

## 2025-05-30 PROCEDURE — 96361 HYDRATE IV INFUSION ADD-ON: CPT

## 2025-05-30 PROCEDURE — 25010000002 ONDANSETRON PER 1 MG: Performed by: EMERGENCY MEDICINE

## 2025-05-30 PROCEDURE — 83605 ASSAY OF LACTIC ACID: CPT

## 2025-05-30 PROCEDURE — 25010000002 METOCLOPRAMIDE PER 10 MG

## 2025-05-30 PROCEDURE — 25810000003 SODIUM CHLORIDE 0.9 % SOLUTION

## 2025-05-30 PROCEDURE — 81001 URINALYSIS AUTO W/SCOPE: CPT

## 2025-05-30 PROCEDURE — 85025 COMPLETE CBC W/AUTO DIFF WBC: CPT | Performed by: EMERGENCY MEDICINE

## 2025-05-30 PROCEDURE — 25010000002 DEXAMETHASONE SODIUM PHOSPHATE 10 MG/ML SOLUTION

## 2025-05-30 PROCEDURE — 36415 COLL VENOUS BLD VENIPUNCTURE: CPT | Performed by: EMERGENCY MEDICINE

## 2025-05-30 PROCEDURE — 96375 TX/PRO/DX INJ NEW DRUG ADDON: CPT

## 2025-05-30 PROCEDURE — 87637 SARSCOV2&INF A&B&RSV AMP PRB: CPT

## 2025-05-30 RX ORDER — DIPHENHYDRAMINE HYDROCHLORIDE 50 MG/ML
25 INJECTION, SOLUTION INTRAMUSCULAR; INTRAVENOUS ONCE
Status: COMPLETED | OUTPATIENT
Start: 2025-05-30 | End: 2025-05-30

## 2025-05-30 RX ORDER — ONDANSETRON 2 MG/ML
4 INJECTION INTRAMUSCULAR; INTRAVENOUS ONCE
Status: COMPLETED | OUTPATIENT
Start: 2025-05-30 | End: 2025-05-30

## 2025-05-30 RX ORDER — SODIUM CHLORIDE 0.9 % (FLUSH) 0.9 %
10 SYRINGE (ML) INJECTION AS NEEDED
Status: DISCONTINUED | OUTPATIENT
Start: 2025-05-30 | End: 2025-05-30 | Stop reason: HOSPADM

## 2025-05-30 RX ORDER — METOCLOPRAMIDE HYDROCHLORIDE 5 MG/ML
10 INJECTION INTRAMUSCULAR; INTRAVENOUS ONCE
Status: COMPLETED | OUTPATIENT
Start: 2025-05-30 | End: 2025-05-30

## 2025-05-30 RX ORDER — DEXAMETHASONE SODIUM PHOSPHATE 10 MG/ML
10 INJECTION, SOLUTION INTRAMUSCULAR; INTRAVENOUS ONCE
Status: COMPLETED | OUTPATIENT
Start: 2025-05-30 | End: 2025-05-30

## 2025-05-30 RX ORDER — KETOROLAC TROMETHAMINE 30 MG/ML
30 INJECTION, SOLUTION INTRAMUSCULAR; INTRAVENOUS ONCE
Status: COMPLETED | OUTPATIENT
Start: 2025-05-30 | End: 2025-05-30

## 2025-05-30 RX ADMIN — SODIUM CHLORIDE 1000 ML: 9 INJECTION, SOLUTION INTRAVENOUS at 11:39

## 2025-05-30 RX ADMIN — DEXAMETHASONE SODIUM PHOSPHATE 10 MG: 10 INJECTION INTRAMUSCULAR; INTRAVENOUS at 11:39

## 2025-05-30 RX ADMIN — METOCLOPRAMIDE 10 MG: 5 INJECTION, SOLUTION INTRAMUSCULAR; INTRAVENOUS at 11:39

## 2025-05-30 RX ADMIN — DIPHENHYDRAMINE HYDROCHLORIDE 25 MG: 50 INJECTION, SOLUTION INTRAMUSCULAR; INTRAVENOUS at 11:40

## 2025-05-30 RX ADMIN — KETOROLAC TROMETHAMINE 30 MG: 30 INJECTION, SOLUTION INTRAMUSCULAR; INTRAVENOUS at 11:39

## 2025-05-30 RX ADMIN — ONDANSETRON 4 MG: 2 INJECTION INTRAMUSCULAR; INTRAVENOUS at 11:39

## 2025-05-30 NOTE — DISCHARGE INSTRUCTIONS
Please follow-up with your primary care provider next week for reevaluation.  Your white blood cell count was elevated today and does need to be rechecked.  Return to the ED for any worsening pain, intractable vomiting, fevers greater than 100.4 or for any other new or worsening concerning symptoms.

## 2025-05-30 NOTE — ED PROVIDER NOTES
"Time: 11:24 AM EDT  Date of encounter:  5/30/2025  Independent Historian/Clinical History and Information was obtained by:   Patient    History is limited by: N/A    Chief Complaint: Headache      History of Present Illness:  Patient is a 64 y.o. year old female who presents to the emergency department for evaluation of headache ongoing for 3 days.  Patient reports history of migraines but that she only gets around once a year and usually comes to the ED for a migraine cocktail.  Denies any home medications for migraines.  Patient reports sensitivity to light and sound.  Denies any recent head injuries.  Patient reports history of non-Hodgkin's lymphoma that is being \"watched\" patient reports she is not currently undergoing treatment.      Patient Care Team  Primary Care Provider: Portia Serna MD    Past Medical History:     Allergies   Allergen Reactions    Clindamycin Hives, Itching and Nausea And Vomiting    Erythromycin Hives, Itching, GI Intolerance and Unknown - High Severity    Oxycodone-Acetaminophen Other (See Comments)     hallucinations     Past Medical History:   Diagnosis Date    Atypical ductal hyperplasia of right breast     Breast lump     Cancer     Chronic cough     Colon polyp     Constipation     Early satiety     Esophageal dysphagia     Fatigue     Hemidiaphragm paralysis     HTN (hypertension)     Hypothyroidism     Lateral epicondylitis of left elbow 03/14/2018    Lymphoma     OM (otitis media)     PONV (postoperative nausea and vomiting)     Rectal bleeding     Right knee pain 08/30/2018    Shortness of breath     Tear of PCL (posterior cruciate ligament) of knee, right, initial encounter 08/30/2018    Thymoma, malignant     Thyroid disorder     Vitamin B12 deficiency     Vitamin D deficiency      Past Surgical History:   Procedure Laterality Date    BREAST LUMPECTOMY Right     COLONOSCOPY      Dr. Roque    COLONOSCOPY N/A 05/10/2024    Procedure: COLONOSCOPY WITH POLYPECTOMY; "  Surgeon: Ayden Roque MD;  Location: [facility] ENDOSCOPY;  Service: Gastroenterology;  Laterality: N/A;  COLON POLYP    ENDOSCOPY  09/11/2018    Dr. Roque    HYSTERECTOMY  1994    STOMACH SURGERY      biopsy    TONSILLECTOMY  1967     Family History   Problem Relation Age of Onset    Breast cancer Mother     Heart disease Mother     Cancer Mother     Arthritis Mother     Heart disease Father     Arthritis Father     Diabetes Sister     Colon cancer Neg Hx        Home Medications:  Prior to Admission medications    Medication Sig Start Date End Date Taking? Authorizing Provider   ammonium lactate (AmLactin) 12 % lotion Apply  topically to the appropriate area as directed As Needed for Dry Skin. 11/15/24   Portia Serna MD   clotrimazole-betamethasone (Lotrisone) 1-0.05 % cream Apply 1 Application topically to the appropriate area as directed 2 (Two) Times a Day. 11/15/24   Portia Serna MD   levothyroxine (Synthroid) 50 MCG tablet Take 1 tablet by mouth Daily.  Patient not taking: Reported on 5/15/2025 11/15/24   Portia Serna MD   nystatin (MYCOSTATIN) 503332 UNIT/GM powder Apply  topically to the appropriate area as directed 3 (Three) Times a Day. 11/15/24   Portia Serna MD   pravastatin (PRAVACHOL) 10 MG tablet Take 1 tablet by mouth Daily.  Patient not taking: Reported on 5/15/2025 11/15/24   Portia Serna MD   triamterene-hydrochlorothiazide (MAXZIDE-25) 37.5-25 MG per tablet Take 0.5 tablets by mouth Daily. 5/15/25   Portia Serna MD   vitamin D (ERGOCALCIFEROL) 1.25 MG (72378 UT) capsule capsule Take 1 capsule by mouth Every 7 (Seven) Days. 11/15/24   Portia Serna MD        Social History:   Social History     Tobacco Use    Smoking status: Never     Passive exposure: Never    Smokeless tobacco: Never   Vaping Use    Vaping status: Never Used   Substance Use Topics    Alcohol use: Never    Drug use: Never  "        Review of Systems:  Review of Systems   Constitutional:  Negative for chills, fatigue and fever.   HENT:  Negative for ear pain, rhinorrhea and sore throat.    Eyes:  Negative for visual disturbance.   Respiratory:  Negative for cough and shortness of breath.    Cardiovascular:  Negative for chest pain.   Gastrointestinal:  Negative for abdominal pain, diarrhea and vomiting.   Genitourinary:  Negative for difficulty urinating.   Musculoskeletal:  Negative for arthralgias, back pain and myalgias.   Skin:  Negative for rash.   Neurological:  Positive for headaches. Negative for light-headedness.   Hematological:  Negative for adenopathy.   Psychiatric/Behavioral: Negative.          Physical Exam:  /70   Pulse 100   Temp 99 °F (37.2 °C) (Oral)   Resp 18   Ht 165.1 cm (65\")   Wt 78.6 kg (173 lb 4.5 oz)   SpO2 93%   BMI 28.84 kg/m²     Physical Exam  Vitals and nursing note reviewed.   Constitutional:       General: She is not in acute distress.     Appearance: Normal appearance. She is not toxic-appearing.   HENT:      Head: Normocephalic and atraumatic.      Nose: Nose normal.      Mouth/Throat:      Mouth: Mucous membranes are moist.   Eyes:      Extraocular Movements: Extraocular movements intact.      Conjunctiva/sclera: Conjunctivae normal.      Pupils: Pupils are equal, round, and reactive to light.   Cardiovascular:      Rate and Rhythm: Normal rate.      Pulses: Normal pulses.      Heart sounds: Normal heart sounds.   Pulmonary:      Effort: Pulmonary effort is normal.      Breath sounds: Normal breath sounds.   Abdominal:      General: Bowel sounds are normal.      Palpations: Abdomen is soft.      Tenderness: There is no abdominal tenderness.   Musculoskeletal:         General: Normal range of motion.      Cervical back: Normal range of motion.   Skin:     General: Skin is warm and dry.   Neurological:      General: No focal deficit present.      Mental Status: She is alert and oriented to " person, place, and time.   Psychiatric:         Mood and Affect: Mood normal.         Behavior: Behavior normal.         Thought Content: Thought content normal.         Judgment: Judgment normal.                    Medical Decision Making:      Comorbidities that affect care:    Cancer    External Notes reviewed:    None      The following orders were placed and all results were independently analyzed by me:  Orders Placed This Encounter   Procedures    COVID-19, FLU A/B, RSV PCR 1 HR TAT - Swab, Nasopharynx    XR Chest 1 View    [place] Draw    Comprehensive Metabolic Panel    CBC Auto Differential    Scan Slide    Urinalysis With Culture If Indicated - Urine, Clean Catch    Lactic Acid, Plasma    Urinalysis, Microscopic Only - Urine, Clean Catch    Insert peripheral IV    CBC & Differential    Green Top (Gel)    Lavender Top    Gold Top - SST    Light Blue Top       Medications Given in the Emergency Department:  Medications   sodium chloride 0.9 % flush 10 mL (has no administration in time range)   ondansetron (ZOFRAN) injection 4 mg (4 mg Intravenous Given 5/30/25 1139)   sodium chloride 0.9 % bolus 1,000 mL (0 mL Intravenous Stopped 5/30/25 1239)   metoclopramide (REGLAN) injection 10 mg (10 mg Intravenous Given 5/30/25 1139)   ketorolac (TORADOL) injection 30 mg (30 mg Intravenous Given 5/30/25 1139)   diphenhydrAMINE (BENADRYL) injection 25 mg (25 mg Intravenous Given 5/30/25 1140)   dexAMETHasone sodium phosphate injection 10 mg (10 mg Intravenous Given 5/30/25 1139)        ED Course:         Labs:    Lab Results (last 24 hours)       Procedure Component Value Units Date/Time    CBC & Differential [[id number]]  (Abnormal) Collected: 05/30/25 1111    Specimen: Blood Updated: 05/30/25 1142    Narrative:      The following orders were created for panel order CBC & Differential.  Procedure                               Abnormality         Status                     ---------                                -----------         ------                     CBC Auto Differential[[id number]]        Abnormal            Final result               Scan Slide[[id number]]                                       Final result                 Please view results for these tests on the individual orders.    CBC Auto Differential [[id number]]  (Abnormal) Collected: 05/30/25 1111    Specimen: Blood Updated: 05/30/25 1142     WBC 14.46 10*3/mm3      RBC 5.03 10*6/mm3      Hemoglobin 15.5 g/dL      Hematocrit 46.5 %      MCV 92.4 fL      MCH 30.8 pg      MCHC 33.3 g/dL      RDW 13.3 %      RDW-SD 45.3 fl      MPV 9.4 fL      Platelets 355 10*3/mm3      Neutrophil % 77.9 %      Lymphocyte % 12.5 %      Monocyte % 7.8 %      Eosinophil % 0.6 %      Basophil % 0.7 %      Immature Grans % 0.5 %      Neutrophils, Absolute 11.27 10*3/mm3      Lymphocytes, Absolute 1.81 10*3/mm3      Monocytes, Absolute 1.13 10*3/mm3      Eosinophils, Absolute 0.08 10*3/mm3      Basophils, Absolute 0.10 10*3/mm3      Immature Grans, Absolute 0.07 10*3/mm3      nRBC 0.0 /100 WBC     Scan Slide [[id number]] Collected: 05/30/25 1111    Specimen: Blood Updated: 05/30/25 1142     RBC Morphology Normal     WBC Morphology Normal     Platelet Estimate Adequate    COVID-19, FLU A/B, RSV PCR 1 HR TAT - Swab, Nasopharynx [[id number]]  (Normal) Collected: 05/30/25 1154    Specimen: Swab from Nasopharynx Updated: 05/30/25 1243     COVID19 Not Detected     Influenza A PCR Not Detected     Influenza B PCR Not Detected     RSV, PCR Not Detected    Narrative:      Fact sheet for providers: https://www.fda.gov/media/213982/download    Fact sheet for patients: https://www.fda.gov/media/391916/download    Test performed by PCR.    Comprehensive Metabolic Panel [[id number]]  (Abnormal) Collected: 05/30/25 1218    Specimen: Blood from Arm, Right Updated: 05/30/25 1247     Glucose 103 mg/dL      BUN 11.6 mg/dL      Creatinine 1.06 mg/dL      Sodium 134 mmol/L      Potassium 3.5 mmol/L       Chloride 99 mmol/L      CO2 25.1 mmol/L      Calcium 8.5 mg/dL      Total Protein 6.8 g/dL      Albumin 3.6 g/dL      ALT (SGPT) 9 U/L      AST (SGOT) 11 U/L      Alkaline Phosphatase 70 U/L      Total Bilirubin 0.8 mg/dL      Globulin 3.2 gm/dL      A/G Ratio 1.1 g/dL      BUN/Creatinine Ratio 10.9     Anion Gap 9.9 mmol/L      eGFR 59.5 mL/min/1.73     Narrative:      GFR Categories in Chronic Kidney Disease (CKD)              GFR Category          GFR (mL/min/1.73)    Interpretation  G1                    90 or greater        Normal or high (1)  G2                    60-89                Mild decrease (1)  G3a                   45-59                Mild to moderate decrease  G3b                   30-44                Moderate to severe decrease  G4                    15-29                Severe decrease  G5                    14 or less           Kidney failure    (1)In the absence of evidence of kidney disease, neither GFR category G1 or G2 fulfill the criteria for CKD.    eGFR calculation 2021 CKD-EPI creatinine equation, which does not include race as a factor    Urinalysis With Culture If Indicated - Urine, Clean Catch [[id number]]  (Abnormal) Collected: 05/30/25 1251    Specimen: Urine, Clean Catch Updated: 05/30/25 1309     Color, UA Yellow     Appearance, UA Clear     pH, UA 8.0     Specific Gravity, UA 1.011     Glucose, UA Negative     Ketones, UA Negative     Bilirubin, UA Negative     Blood, UA Trace     Protein, UA Trace     Leuk Esterase, UA Trace     Nitrite, UA Negative     Urobilinogen, UA 1.0 E.U./dL    Narrative:      In absence of clinical symptoms, the presence of pyuria, bacteria, and/or nitrites on the urinalysis result does not correlate with infection.    Urinalysis, Microscopic Only - Urine, Clean Catch [[id number]]  (Abnormal) Collected: 05/30/25 1251    Specimen: Urine, Clean Catch Updated: 05/30/25 1309     RBC, UA 3-5 /HPF      WBC, UA 3-5 /HPF      Comment: Urine culture not indicated.         Bacteria, UA None Seen /HPF      Squamous Epithelial Cells, UA 13-20 /HPF      Hyaline Casts, UA 0-2 /LPF      Methodology Automated Microscopy    Lactic Acid, Plasma [[id number]]  (Normal) Collected: 05/30/25 1350    Specimen: Blood from Arm, Right Updated: 05/30/25 1421     Lactate 0.6 mmol/L              Imaging:    XR Chest 1 View  Result Date: 5/30/2025  XR CHEST 1 VW Date of Exam: 5/30/2025 12:25 PM EDT Indication: fever Comparison: AP chest 1/4/2025.. PET/CT 10/4/2024. Findings: Dense left basilar opacity obscures the diaphragmatic margin, similar in appearance to the prior study. Chronic left hemidiaphragm elevation is demonstrated better advantage on prior PET/CT imaging. Heart size is normal. Right lung appears clear. No pneumothorax. No acute or suspicious osseous abnormalities.     Impression: Chronic left hemidiaphragm elevation. Left basilar opacity thought to represent chronic passive atelectasis, without significant interval change. 2. No new chest findings. Electronically Signed: Alexandra Landaverde MD  5/30/2025 12:50 PM EDT  Workstation ID: [id number]        Differential Diagnosis and Discussion:    Headache: Differential diagnosis includes but is not limited to migraine, cluster headache, hypertension, tumor, subarachnoid bleeding, pseudotumor cerebri, temporal arteritis, infections, tension headache, and TMJ syndrome.    PROCEDURES:    Labs were collected in the emergency department and all labs were reviewed and interpreted by me.  X-ray were performed in the emergency department and all X-ray impressions were independently interpreted by me.    No orders to display       Procedures    MDM     Amount and/or Complexity of Data Reviewed  Decide to obtain previous medical records or to obtain history from someone other than the patient: yes                       Patient Care Considerations:    ANTIBIOTICS: I considered prescribing antibiotics as an outpatient however no bacterial focus of infection  was found.      Consultants/Shared Management Plan:    SHARED VISIT: I have discussed the case with my supervising physician, Dr. Cancino who states he agrees with treatment plan. The substantive portion of the medical decision was made by the attesting physician who made or approve the management plan and will take responsibility for the patient.  Clinical findings were discussed and ultimate disposition was made in consult with supervising physician.    Social Determinants of Health:    Patient is independent, reliable, and has access to care.       Disposition and Care Coordination:    Discharged: The patient is suitable and stable for discharge with no need for consideration of admission.    I have explained the patient´s condition, diagnoses and treatment plan based on the information available to me at this time. I have answered questions and addressed any concerns. The patient has a good  understanding of the patient´s diagnosis, condition, and treatment plan as can be expected at this point. The vital signs have been stable. The patient´s condition is stable and appropriate for discharge from the emergency department.      The patient will pursue further outpatient evaluation with the primary care physician or other designated or consulting physician as outlined in the discharge instructions. They are agreeable to this plan of care and follow-up instructions have been explained in detail. The patient has received these instructions in written format and has expressed an understanding of the discharge instructions. The patient is aware that any significant change in condition or worsening of symptoms should prompt an immediate return to this or the closest emergency department or call to 911.  I have explained discharge medications and the need for follow up with the patient/caretakers. This was also printed in the discharge instructions. Patient was discharged with the following medications and follow up:       Medication List      No changes were made to your prescriptions during this visit.      Portia Serna MD  [address] [id number] [phone]    Go in 3 days         Final diagnoses:   Acute nonintractable headache, unspecified headache type        ED Disposition       ED Disposition   Discharge    Condition   Stable    Comment   --               This medical record created using voice recognition software.             Arleen Vidales, APRN  05/30/25 1449

## 2025-05-30 NOTE — ED PROVIDER NOTES
"SHARED VISIT ATTESTATION:    This visit was performed by myself and an APC.  I personally approved the management plan/medical decision making and take responsibility for the patient management.      SHARED VISIT NOTE:    Patient is 64 y.o. year old female that presents to the ED for evaluation of headache.     Physical Exam    ED Course:    /70   Pulse 100   Temp 99 °F (37.2 °C) (Oral)   Resp 18   Ht 165.1 cm (65\")   Wt 78.6 kg (173 lb 4.5 oz)   SpO2 93%   BMI 28.84 kg/m²       The following orders were placed and all results were independently analyzed by me:  Orders Placed This Encounter   Procedures    COVID-19, FLU A/B, RSV PCR 1 HR TAT - Swab, Nasopharynx    XR Chest 1 View    [place] Draw    Comprehensive Metabolic Panel    CBC Auto Differential    Scan Slide    Urinalysis With Culture If Indicated - Urine, Clean Catch    Lactic Acid, Plasma    Urinalysis, Microscopic Only - Urine, Clean Catch    Insert peripheral IV    CBC & Differential    Green Top (Gel)    Lavender Top    Gold Top - SST    Light Blue Top       Medications Given in the Emergency Department:  Medications   sodium chloride 0.9 % flush 10 mL (has no administration in time range)   ondansetron (ZOFRAN) injection 4 mg (4 mg Intravenous Given 5/30/25 1139)   sodium chloride 0.9 % bolus 1,000 mL (0 mL Intravenous Stopped 5/30/25 1239)   metoclopramide (REGLAN) injection 10 mg (10 mg Intravenous Given 5/30/25 1139)   ketorolac (TORADOL) injection 30 mg (30 mg Intravenous Given 5/30/25 1139)   diphenhydrAMINE (BENADRYL) injection 25 mg (25 mg Intravenous Given 5/30/25 1140)   dexAMETHasone sodium phosphate injection 10 mg (10 mg Intravenous Given 5/30/25 1139)        ED Course:         Labs:    Lab Results (last 24 hours)       Procedure Component Value Units Date/Time    CBC & Differential [[id number]]  (Abnormal) Collected: 05/30/25 1111    Specimen: Blood Updated: 05/30/25 1142    Narrative:      The following orders were created for " panel order CBC & Differential.  Procedure                               Abnormality         Status                     ---------                               -----------         ------                     CBC Auto Differential[[id number]]        Abnormal            Final result               Scan Slide[[id number]]                                       Final result                 Please view results for these tests on the individual orders.    CBC Auto Differential [[id number]]  (Abnormal) Collected: 05/30/25 1111    Specimen: Blood Updated: 05/30/25 1142     WBC 14.46 10*3/mm3      RBC 5.03 10*6/mm3      Hemoglobin 15.5 g/dL      Hematocrit 46.5 %      MCV 92.4 fL      MCH 30.8 pg      MCHC 33.3 g/dL      RDW 13.3 %      RDW-SD 45.3 fl      MPV 9.4 fL      Platelets 355 10*3/mm3      Neutrophil % 77.9 %      Lymphocyte % 12.5 %      Monocyte % 7.8 %      Eosinophil % 0.6 %      Basophil % 0.7 %      Immature Grans % 0.5 %      Neutrophils, Absolute 11.27 10*3/mm3      Lymphocytes, Absolute 1.81 10*3/mm3      Monocytes, Absolute 1.13 10*3/mm3      Eosinophils, Absolute 0.08 10*3/mm3      Basophils, Absolute 0.10 10*3/mm3      Immature Grans, Absolute 0.07 10*3/mm3      nRBC 0.0 /100 WBC     Scan Slide [[id number]] Collected: 05/30/25 1111    Specimen: Blood Updated: 05/30/25 1142     RBC Morphology Normal     WBC Morphology Normal     Platelet Estimate Adequate    COVID-19, FLU A/B, RSV PCR 1 HR TAT - Swab, Nasopharynx [[id number]]  (Normal) Collected: 05/30/25 1154    Specimen: Swab from Nasopharynx Updated: 05/30/25 1243     COVID19 Not Detected     Influenza A PCR Not Detected     Influenza B PCR Not Detected     RSV, PCR Not Detected    Narrative:      Fact sheet for providers: https://www.fda.gov/media/403474/download    Fact sheet for patients: https://www.fda.gov/media/950199/download    Test performed by PCR.    Comprehensive Metabolic Panel [[id number]]  (Abnormal) Collected: 05/30/25 1218    Specimen: Blood  from Arm, Right Updated: 05/30/25 1247     Glucose 103 mg/dL      BUN 11.6 mg/dL      Creatinine 1.06 mg/dL      Sodium 134 mmol/L      Potassium 3.5 mmol/L      Chloride 99 mmol/L      CO2 25.1 mmol/L      Calcium 8.5 mg/dL      Total Protein 6.8 g/dL      Albumin 3.6 g/dL      ALT (SGPT) 9 U/L      AST (SGOT) 11 U/L      Alkaline Phosphatase 70 U/L      Total Bilirubin 0.8 mg/dL      Globulin 3.2 gm/dL      A/G Ratio 1.1 g/dL      BUN/Creatinine Ratio 10.9     Anion Gap 9.9 mmol/L      eGFR 59.5 mL/min/1.73     Narrative:      GFR Categories in Chronic Kidney Disease (CKD)              GFR Category          GFR (mL/min/1.73)    Interpretation  G1                    90 or greater        Normal or high (1)  G2                    60-89                Mild decrease (1)  G3a                   45-59                Mild to moderate decrease  G3b                   30-44                Moderate to severe decrease  G4                    15-29                Severe decrease  G5                    14 or less           Kidney failure    (1)In the absence of evidence of kidney disease, neither GFR category G1 or G2 fulfill the criteria for CKD.    eGFR calculation 2021 CKD-EPI creatinine equation, which does not include race as a factor    Urinalysis With Culture If Indicated - Urine, Clean Catch [[id number]]  (Abnormal) Collected: 05/30/25 1251    Specimen: Urine, Clean Catch Updated: 05/30/25 1309     Color, UA Yellow     Appearance, UA Clear     pH, UA 8.0     Specific Gravity, UA 1.011     Glucose, UA Negative     Ketones, UA Negative     Bilirubin, UA Negative     Blood, UA Trace     Protein, UA Trace     Leuk Esterase, UA Trace     Nitrite, UA Negative     Urobilinogen, UA 1.0 E.U./dL    Narrative:      In absence of clinical symptoms, the presence of pyuria, bacteria, and/or nitrites on the urinalysis result does not correlate with infection.    Urinalysis, Microscopic Only - Urine, Clean Catch [[id number]]  (Abnormal)  Collected: 05/30/25 1251    Specimen: Urine, Clean Catch Updated: 05/30/25 1309     RBC, UA 3-5 /HPF      WBC, UA 3-5 /HPF      Comment: Urine culture not indicated.        Bacteria, UA None Seen /HPF      Squamous Epithelial Cells, UA 13-20 /HPF      Hyaline Casts, UA 0-2 /LPF      Methodology Automated Microscopy    Lactic Acid, Plasma [[id number]]  (Normal) Collected: 05/30/25 1350    Specimen: Blood from Arm, Right Updated: 05/30/25 1421     Lactate 0.6 mmol/L              Imaging:    XR Chest 1 View  Result Date: 5/30/2025  XR CHEST 1 VW Date of Exam: 5/30/2025 12:25 PM EDT Indication: fever Comparison: AP chest 1/4/2025.. PET/CT 10/4/2024. Findings: Dense left basilar opacity obscures the diaphragmatic margin, similar in appearance to the prior study. Chronic left hemidiaphragm elevation is demonstrated better advantage on prior PET/CT imaging. Heart size is normal. Right lung appears clear. No pneumothorax. No acute or suspicious osseous abnormalities.     Impression: Chronic left hemidiaphragm elevation. Left basilar opacity thought to represent chronic passive atelectasis, without significant interval change. 2. No new chest findings. Electronically Signed: Alexandra Landaverde MD  5/30/2025 12:50 PM EDT  Workstation ID: [id number]      MDM:    Procedures    Labs were collected in the emergency department and all labs were reviewed and interpreted by me.  X-ray were performed in the emergency department and all X-ray impressions were independently interpreted by me.                     Ayush Cancino MD  14:28 EDT  05/30/25         Ayush Cancino MD  05/30/25 1422

## 2025-06-01 ENCOUNTER — APPOINTMENT (OUTPATIENT)
Dept: GENERAL RADIOLOGY | Facility: HOSPITAL | Age: 65
DRG: 871 | End: 2025-06-01
Payer: MEDICARE

## 2025-06-01 ENCOUNTER — HOSPITAL ENCOUNTER (INPATIENT)
Facility: HOSPITAL | Age: 65
LOS: 4 days | Discharge: HOME HEALTH | DRG: 871 | End: 2025-06-05
Attending: EMERGENCY MEDICINE | Admitting: INTERNAL MEDICINE
Payer: MEDICARE

## 2025-06-01 ENCOUNTER — APPOINTMENT (OUTPATIENT)
Dept: CT IMAGING | Facility: HOSPITAL | Age: 65
DRG: 871 | End: 2025-06-01
Payer: MEDICARE

## 2025-06-01 DIAGNOSIS — R65.20 SEPSIS WITH ACUTE HYPOXIC RESPIRATORY FAILURE WITHOUT SEPTIC SHOCK, DUE TO UNSPECIFIED ORGANISM: ICD-10-CM

## 2025-06-01 DIAGNOSIS — J96.01 SEPSIS WITH ACUTE HYPOXIC RESPIRATORY FAILURE WITHOUT SEPTIC SHOCK, DUE TO UNSPECIFIED ORGANISM: ICD-10-CM

## 2025-06-01 DIAGNOSIS — A41.9 SEPSIS WITH ACUTE HYPOXIC RESPIRATORY FAILURE WITHOUT SEPTIC SHOCK, DUE TO UNSPECIFIED ORGANISM: ICD-10-CM

## 2025-06-01 DIAGNOSIS — R26.2 DIFFICULTY IN WALKING: ICD-10-CM

## 2025-06-01 DIAGNOSIS — J18.9 PNEUMONIA OF LEFT LOWER LOBE DUE TO INFECTIOUS ORGANISM: Primary | ICD-10-CM

## 2025-06-01 PROBLEM — R53.1 WEAKNESS: Status: ACTIVE | Noted: 2025-06-01

## 2025-06-01 LAB
ALBUMIN SERPL-MCNC: 3.5 G/DL (ref 3.5–5.2)
ALBUMIN/GLOB SERPL: 1.1 G/DL
ALP SERPL-CCNC: 60 U/L (ref 39–117)
ALT SERPL W P-5'-P-CCNC: 7 U/L (ref 1–33)
ANION GAP SERPL CALCULATED.3IONS-SCNC: 11 MMOL/L (ref 5–15)
AST SERPL-CCNC: 11 U/L (ref 1–32)
BACTERIA UR QL AUTO: ABNORMAL /HPF
BASOPHILS # BLD AUTO: 0.07 10*3/MM3 (ref 0–0.2)
BASOPHILS NFR BLD AUTO: 0.5 % (ref 0–1.5)
BILIRUB SERPL-MCNC: 0.6 MG/DL (ref 0–1.2)
BILIRUB UR QL STRIP: NEGATIVE
BUN SERPL-MCNC: 19.1 MG/DL (ref 8–23)
BUN/CREAT SERPL: 13.9 (ref 7–25)
CALCIUM SPEC-SCNC: 8.2 MG/DL (ref 8.6–10.5)
CHLORIDE SERPL-SCNC: 99 MMOL/L (ref 98–107)
CLARITY UR: CLEAR
CO2 SERPL-SCNC: 25 MMOL/L (ref 22–29)
COLOR UR: YELLOW
CREAT SERPL-MCNC: 1.37 MG/DL (ref 0.57–1)
D-LACTATE SERPL-SCNC: 1.1 MMOL/L (ref 0.5–2)
DEPRECATED RDW RBC AUTO: 44.9 FL (ref 37–54)
EGFRCR SERPLBLD CKD-EPI 2021: 43.7 ML/MIN/1.73
EOSINOPHIL # BLD AUTO: 0.01 10*3/MM3 (ref 0–0.4)
EOSINOPHIL NFR BLD AUTO: 0.1 % (ref 0.3–6.2)
ERYTHROCYTE [DISTWIDTH] IN BLOOD BY AUTOMATED COUNT: 13.2 % (ref 12.3–15.4)
FLUAV RNA RESP QL NAA+PROBE: NOT DETECTED
FLUBV RNA RESP QL NAA+PROBE: NOT DETECTED
GEN 5 1HR TROPONIN T REFLEX: 11 NG/L
GLOBULIN UR ELPH-MCNC: 3.1 GM/DL
GLUCOSE SERPL-MCNC: 125 MG/DL (ref 65–99)
GLUCOSE UR STRIP-MCNC: NEGATIVE MG/DL
HCT VFR BLD AUTO: 41.5 % (ref 34–46.6)
HGB BLD-MCNC: 13.8 G/DL (ref 12–15.9)
HGB UR QL STRIP.AUTO: ABNORMAL
HOLD SPECIMEN: NORMAL
HOLD SPECIMEN: NORMAL
HYALINE CASTS UR QL AUTO: ABNORMAL /LPF
IMM GRANULOCYTES # BLD AUTO: 0.24 10*3/MM3 (ref 0–0.05)
IMM GRANULOCYTES NFR BLD AUTO: 1.7 % (ref 0–0.5)
KETONES UR QL STRIP: NEGATIVE
LEUKOCYTE ESTERASE UR QL STRIP.AUTO: NEGATIVE
LYMPHOCYTES # BLD AUTO: 1.12 10*3/MM3 (ref 0.7–3.1)
LYMPHOCYTES NFR BLD AUTO: 7.8 % (ref 19.6–45.3)
MAGNESIUM SERPL-MCNC: 1.7 MG/DL (ref 1.6–2.4)
MCH RBC QN AUTO: 30.5 PG (ref 26.6–33)
MCHC RBC AUTO-ENTMCNC: 33.3 G/DL (ref 31.5–35.7)
MCV RBC AUTO: 91.8 FL (ref 79–97)
MONOCYTES # BLD AUTO: 0.92 10*3/MM3 (ref 0.1–0.9)
MONOCYTES NFR BLD AUTO: 6.4 % (ref 5–12)
NEUTROPHILS NFR BLD AUTO: 12.04 10*3/MM3 (ref 1.7–7)
NEUTROPHILS NFR BLD AUTO: 83.5 % (ref 42.7–76)
NITRITE UR QL STRIP: NEGATIVE
NRBC BLD AUTO-RTO: 0 /100 WBC (ref 0–0.2)
PH UR STRIP.AUTO: 6.5 [PH] (ref 5–8)
PLATELET # BLD AUTO: 284 10*3/MM3 (ref 140–450)
PMV BLD AUTO: 9.1 FL (ref 6–12)
POTASSIUM SERPL-SCNC: 3.9 MMOL/L (ref 3.5–5.2)
PROCALCITONIN SERPL-MCNC: 0.27 NG/ML (ref 0–0.25)
PROT SERPL-MCNC: 6.6 G/DL (ref 6–8.5)
PROT UR QL STRIP: ABNORMAL
RBC # BLD AUTO: 4.52 10*6/MM3 (ref 3.77–5.28)
RBC # UR STRIP: ABNORMAL /HPF
REF LAB TEST METHOD: ABNORMAL
RSV RNA RESP QL NAA+PROBE: NOT DETECTED
SARS-COV-2 RNA RESP QL NAA+PROBE: NOT DETECTED
SODIUM SERPL-SCNC: 135 MMOL/L (ref 136–145)
SP GR UR STRIP: 1.01 (ref 1–1.03)
SQUAMOUS #/AREA URNS HPF: ABNORMAL /HPF
TROPONIN T % DELTA: -21
TROPONIN T NUMERIC DELTA: -3 NG/L
TROPONIN T SERPL HS-MCNC: 14 NG/L
UROBILINOGEN UR QL STRIP: ABNORMAL
WBC # UR STRIP: ABNORMAL /HPF
WBC NRBC COR # BLD AUTO: 14.4 10*3/MM3 (ref 3.4–10.8)
WHOLE BLOOD HOLD COAG: NORMAL
WHOLE BLOOD HOLD SPECIMEN: NORMAL

## 2025-06-01 PROCEDURE — 83735 ASSAY OF MAGNESIUM: CPT

## 2025-06-01 PROCEDURE — 99223 1ST HOSP IP/OBS HIGH 75: CPT | Performed by: INTERNAL MEDICINE

## 2025-06-01 PROCEDURE — 93005 ELECTROCARDIOGRAM TRACING: CPT

## 2025-06-01 PROCEDURE — 85025 COMPLETE CBC W/AUTO DIFF WBC: CPT

## 2025-06-01 PROCEDURE — 93010 ELECTROCARDIOGRAM REPORT: CPT | Performed by: INTERNAL MEDICINE

## 2025-06-01 PROCEDURE — 83605 ASSAY OF LACTIC ACID: CPT | Performed by: EMERGENCY MEDICINE

## 2025-06-01 PROCEDURE — 84145 PROCALCITONIN (PCT): CPT | Performed by: EMERGENCY MEDICINE

## 2025-06-01 PROCEDURE — 25810000003 SODIUM CHLORIDE 0.9 % SOLUTION: Performed by: INTERNAL MEDICINE

## 2025-06-01 PROCEDURE — 74177 CT ABD & PELVIS W/CONTRAST: CPT

## 2025-06-01 PROCEDURE — 25010000002 CEFTRIAXONE PER 250 MG: Performed by: EMERGENCY MEDICINE

## 2025-06-01 PROCEDURE — 87637 SARSCOV2&INF A&B&RSV AMP PRB: CPT

## 2025-06-01 PROCEDURE — 25510000001 IOPAMIDOL PER 1 ML: Performed by: INTERNAL MEDICINE

## 2025-06-01 PROCEDURE — 99291 CRITICAL CARE FIRST HOUR: CPT

## 2025-06-01 PROCEDURE — 71045 X-RAY EXAM CHEST 1 VIEW: CPT

## 2025-06-01 PROCEDURE — 25010000002 AMPICILLIN-SULBACTAM PER 1.5 G: Performed by: INTERNAL MEDICINE

## 2025-06-01 PROCEDURE — 84484 ASSAY OF TROPONIN QUANT: CPT | Performed by: EMERGENCY MEDICINE

## 2025-06-01 PROCEDURE — 36415 COLL VENOUS BLD VENIPUNCTURE: CPT

## 2025-06-01 PROCEDURE — 87040 BLOOD CULTURE FOR BACTERIA: CPT | Performed by: EMERGENCY MEDICINE

## 2025-06-01 PROCEDURE — 87899 AGENT NOS ASSAY W/OPTIC: CPT | Performed by: INTERNAL MEDICINE

## 2025-06-01 PROCEDURE — 87385 HISTOPLASMA CAPSUL AG IA: CPT | Performed by: NURSE PRACTITIONER

## 2025-06-01 PROCEDURE — 25010000002 ONDANSETRON PER 1 MG: Performed by: INTERNAL MEDICINE

## 2025-06-01 PROCEDURE — 71260 CT THORAX DX C+: CPT

## 2025-06-01 PROCEDURE — 80053 COMPREHEN METABOLIC PANEL: CPT

## 2025-06-01 PROCEDURE — 86738 MYCOPLASMA ANTIBODY: CPT | Performed by: INTERNAL MEDICINE

## 2025-06-01 PROCEDURE — 84484 ASSAY OF TROPONIN QUANT: CPT

## 2025-06-01 PROCEDURE — 81001 URINALYSIS AUTO W/SCOPE: CPT

## 2025-06-01 PROCEDURE — 93005 ELECTROCARDIOGRAM TRACING: CPT | Performed by: EMERGENCY MEDICINE

## 2025-06-01 PROCEDURE — 94640 AIRWAY INHALATION TREATMENT: CPT

## 2025-06-01 PROCEDURE — 87449 NOS EACH ORGANISM AG IA: CPT | Performed by: INTERNAL MEDICINE

## 2025-06-01 PROCEDURE — 94799 UNLISTED PULMONARY SVC/PX: CPT

## 2025-06-01 RX ORDER — SODIUM CHLORIDE 0.9 % (FLUSH) 0.9 %
10 SYRINGE (ML) INJECTION AS NEEDED
Status: DISCONTINUED | OUTPATIENT
Start: 2025-06-01 | End: 2025-06-02

## 2025-06-01 RX ORDER — POLYETHYLENE GLYCOL 3350 17 G/17G
17 POWDER, FOR SOLUTION ORAL DAILY PRN
Status: DISCONTINUED | OUTPATIENT
Start: 2025-06-01 | End: 2025-06-05 | Stop reason: HOSPADM

## 2025-06-01 RX ORDER — BISACODYL 5 MG/1
5 TABLET, DELAYED RELEASE ORAL DAILY PRN
Status: DISCONTINUED | OUTPATIENT
Start: 2025-06-01 | End: 2025-06-05 | Stop reason: HOSPADM

## 2025-06-01 RX ORDER — IPRATROPIUM BROMIDE AND ALBUTEROL SULFATE 2.5; .5 MG/3ML; MG/3ML
3 SOLUTION RESPIRATORY (INHALATION) ONCE
Status: COMPLETED | OUTPATIENT
Start: 2025-06-01 | End: 2025-06-01

## 2025-06-01 RX ORDER — GUAIFENESIN 200 MG/10ML
200 LIQUID ORAL EVERY 4 HOURS PRN
Status: DISCONTINUED | OUTPATIENT
Start: 2025-06-01 | End: 2025-06-05 | Stop reason: HOSPADM

## 2025-06-01 RX ORDER — CALCIUM CARBONATE 500 MG/1
2 TABLET, CHEWABLE ORAL 2 TIMES DAILY PRN
Status: DISCONTINUED | OUTPATIENT
Start: 2025-06-01 | End: 2025-06-05 | Stop reason: HOSPADM

## 2025-06-01 RX ORDER — SODIUM CHLORIDE 9 MG/ML
100 INJECTION, SOLUTION INTRAVENOUS CONTINUOUS
Status: DISCONTINUED | OUTPATIENT
Start: 2025-06-01 | End: 2025-06-05 | Stop reason: HOSPADM

## 2025-06-01 RX ORDER — LEVOTHYROXINE SODIUM 50 UG/1
50 TABLET ORAL
Status: DISCONTINUED | OUTPATIENT
Start: 2025-06-02 | End: 2025-06-05 | Stop reason: HOSPADM

## 2025-06-01 RX ORDER — IOPAMIDOL 755 MG/ML
100 INJECTION, SOLUTION INTRAVASCULAR
Status: COMPLETED | OUTPATIENT
Start: 2025-06-01 | End: 2025-06-01

## 2025-06-01 RX ORDER — SODIUM CHLORIDE 9 MG/ML
40 INJECTION, SOLUTION INTRAVENOUS AS NEEDED
Status: DISCONTINUED | OUTPATIENT
Start: 2025-06-01 | End: 2025-06-05 | Stop reason: HOSPADM

## 2025-06-01 RX ORDER — BENZONATATE 100 MG/1
100 CAPSULE ORAL 3 TIMES DAILY PRN
Status: DISCONTINUED | OUTPATIENT
Start: 2025-06-01 | End: 2025-06-05 | Stop reason: HOSPADM

## 2025-06-01 RX ORDER — PRAVASTATIN SODIUM 20 MG
10 TABLET ORAL DAILY
Status: DISCONTINUED | OUTPATIENT
Start: 2025-06-02 | End: 2025-06-05 | Stop reason: HOSPADM

## 2025-06-01 RX ORDER — SODIUM CHLORIDE 0.9 % (FLUSH) 0.9 %
10 SYRINGE (ML) INJECTION EVERY 12 HOURS SCHEDULED
Status: DISCONTINUED | OUTPATIENT
Start: 2025-06-01 | End: 2025-06-05 | Stop reason: HOSPADM

## 2025-06-01 RX ORDER — CLOTRIMAZOLE AND BETAMETHASONE DIPROPIONATE 10; .64 MG/G; MG/G
1 CREAM TOPICAL 2 TIMES DAILY
Status: DISCONTINUED | OUTPATIENT
Start: 2025-06-01 | End: 2025-06-05 | Stop reason: HOSPADM

## 2025-06-01 RX ORDER — AMMONIUM LACTATE 12 G/100G
LOTION TOPICAL AS NEEDED
Status: DISCONTINUED | OUTPATIENT
Start: 2025-06-01 | End: 2025-06-05 | Stop reason: HOSPADM

## 2025-06-01 RX ORDER — SODIUM CHLORIDE 0.9 % (FLUSH) 0.9 %
10 SYRINGE (ML) INJECTION AS NEEDED
Status: DISCONTINUED | OUTPATIENT
Start: 2025-06-01 | End: 2025-06-05 | Stop reason: HOSPADM

## 2025-06-01 RX ORDER — BISACODYL 10 MG
10 SUPPOSITORY, RECTAL RECTAL DAILY PRN
Status: DISCONTINUED | OUTPATIENT
Start: 2025-06-01 | End: 2025-06-05 | Stop reason: HOSPADM

## 2025-06-01 RX ORDER — SODIUM CHLORIDE 9 MG/ML
100 INJECTION, SOLUTION INTRAVENOUS CONTINUOUS
Status: ACTIVE | OUTPATIENT
Start: 2025-06-01 | End: 2025-06-03

## 2025-06-01 RX ORDER — ACETAMINOPHEN 500 MG
1000 TABLET ORAL ONCE
Status: COMPLETED | OUTPATIENT
Start: 2025-06-01 | End: 2025-06-01

## 2025-06-01 RX ORDER — ONDANSETRON 2 MG/ML
4 INJECTION INTRAMUSCULAR; INTRAVENOUS EVERY 6 HOURS PRN
Status: DISCONTINUED | OUTPATIENT
Start: 2025-06-01 | End: 2025-06-05 | Stop reason: HOSPADM

## 2025-06-01 RX ORDER — AMOXICILLIN 250 MG
2 CAPSULE ORAL 2 TIMES DAILY PRN
Status: DISCONTINUED | OUTPATIENT
Start: 2025-06-01 | End: 2025-06-05 | Stop reason: HOSPADM

## 2025-06-01 RX ORDER — ACETAMINOPHEN 325 MG/1
650 TABLET ORAL EVERY 6 HOURS PRN
Status: DISCONTINUED | OUTPATIENT
Start: 2025-06-01 | End: 2025-06-05 | Stop reason: HOSPADM

## 2025-06-01 RX ADMIN — IPRATROPIUM BROMIDE AND ALBUTEROL SULFATE 3 ML: .5; 3 SOLUTION RESPIRATORY (INHALATION) at 15:11

## 2025-06-01 RX ADMIN — IOPAMIDOL 100 ML: 755 INJECTION, SOLUTION INTRAVENOUS at 19:12

## 2025-06-01 RX ADMIN — ACETAMINOPHEN 650 MG: 325 TABLET ORAL at 23:23

## 2025-06-01 RX ADMIN — BENZONATATE 100 MG: 100 CAPSULE ORAL at 22:36

## 2025-06-01 RX ADMIN — SODIUM CHLORIDE 100 ML/HR: 9 INJECTION, SOLUTION INTRAVENOUS at 22:37

## 2025-06-01 RX ADMIN — Medication 10 ML: at 22:16

## 2025-06-01 RX ADMIN — ACETAMINOPHEN 1000 MG: 500 TABLET ORAL at 15:02

## 2025-06-01 RX ADMIN — SODIUM CHLORIDE, POTASSIUM CHLORIDE, SODIUM LACTATE AND CALCIUM CHLORIDE 2028 ML: 600; 310; 30; 20 INJECTION, SOLUTION INTRAVENOUS at 14:59

## 2025-06-01 RX ADMIN — ONDANSETRON 4 MG: 2 INJECTION INTRAMUSCULAR; INTRAVENOUS at 22:36

## 2025-06-01 RX ADMIN — GUAIFENESIN 200 MG: 100 LIQUID ORAL at 22:36

## 2025-06-01 RX ADMIN — CEFTRIAXONE SODIUM 2000 MG: 2 INJECTION, POWDER, FOR SOLUTION INTRAMUSCULAR; INTRAVENOUS at 14:48

## 2025-06-01 RX ADMIN — AMPICILLIN AND SULBACTAM 3 G: 2; 1 INJECTION, POWDER, FOR SOLUTION INTRAVENOUS at 22:15

## 2025-06-01 NOTE — ED PROVIDER NOTES
Time: 3:22 PM EDT  Date of encounter:  6/1/2025  Independent Historian/Clinical History and Information was obtained by:   Patient and Family  Chief Complaint: Cough, weakness    History is limited by: N/A    History of Present Illness:  Patient is a 64 y.o. year old female who presents to the emergency department for evaluation of cough and weakness.  Patient states that she has been sick for the last several days.  Patient reports that she was seen in the ER 2 days ago for the same complaints.  Patient reports that she is only gotten worse.  Patient reports she has been extremely weak.  Patient reports she has a cough that is mostly nonproductive.  Patient also complains of nausea and a pounding headache.  Patient describes having chills or rigors last night and had a Tmax of 100.4 earlier today.  Subsequently she presents back to the ER for further evaluation.  Patient also endorses decreased p.o. intake.    Patient Care Team  Primary Care Provider: Portia Serna MD    Past Medical History:     Allergies   Allergen Reactions    Clindamycin Hives, Itching and Nausea And Vomiting    Erythromycin Hives, Itching, GI Intolerance and Unknown - High Severity    Oxycodone-Acetaminophen Hallucinations     Past Medical History:   Diagnosis Date    Atypical ductal hyperplasia of right breast     Breast lump     Cancer     Chronic cough     Colon polyp     Constipation     Early satiety     Esophageal dysphagia     Fatigue     Hemidiaphragm paralysis     HTN (hypertension)     Hypothyroidism     Lateral epicondylitis of left elbow 03/14/2018    Lymphoma     OM (otitis media)     PONV (postoperative nausea and vomiting)     Rectal bleeding     Right knee pain 08/30/2018    Shortness of breath     Tear of PCL (posterior cruciate ligament) of knee, right, initial encounter 08/30/2018    Thymoma, malignant     Thyroid disorder     Vitamin B12 deficiency     Vitamin D deficiency      Past Surgical History:    Procedure Laterality Date    BREAST LUMPECTOMY Right     COLONOSCOPY      Dr. Roque    COLONOSCOPY N/A 05/10/2024    Procedure: COLONOSCOPY WITH POLYPECTOMY;  Surgeon: Ayden Roque MD;  Location: [facility] ENDOSCOPY;  Service: Gastroenterology;  Laterality: N/A;  COLON POLYP    ENDOSCOPY  09/11/2018    Dr. Roque    HYSTERECTOMY  1994    STOMACH SURGERY      biopsy    TONSILLECTOMY  1967     Family History   Problem Relation Age of Onset    Breast cancer Mother     Heart disease Mother     Cancer Mother     Arthritis Mother     Heart disease Father     Arthritis Father     Diabetes Sister     Colon cancer Neg Hx        Home Medications:  Prior to Admission medications    Medication Sig Start Date End Date Taking? Authorizing Provider   ammonium lactate (AmLactin) 12 % lotion Apply  topically to the appropriate area as directed As Needed for Dry Skin. 11/15/24   Portia Serna MD   clotrimazole-betamethasone (Lotrisone) 1-0.05 % cream Apply 1 Application topically to the appropriate area as directed 2 (Two) Times a Day. 11/15/24   Portia Serna MD   levothyroxine (Synthroid) 50 MCG tablet Take 1 tablet by mouth Daily.  Patient not taking: Reported on 5/15/2025 11/15/24   Portia Serna MD   nystatin (MYCOSTATIN) 530006 UNIT/GM powder Apply  topically to the appropriate area as directed 3 (Three) Times a Day. 11/15/24   Portia Serna MD   pravastatin (PRAVACHOL) 10 MG tablet Take 1 tablet by mouth Daily.  Patient not taking: Reported on 5/15/2025 11/15/24   Portia Serna MD   triamterene-hydrochlorothiazide (MAXZIDE-25) 37.5-25 MG per tablet Take 0.5 tablets by mouth Daily. 5/15/25   Portia Serna MD   vitamin D (ERGOCALCIFEROL) 1.25 MG (84641 UT) capsule capsule Take 1 capsule by mouth Every 7 (Seven) Days. 11/15/24   Portia Serna MD        Social History:   Social History     Tobacco Use    Smoking status: Never      "Passive exposure: Never    Smokeless tobacco: Never   Vaping Use    Vaping status: Never Used   Substance Use Topics    Alcohol use: Never    Drug use: Never         Review of Systems:  Review of Systems   Constitutional:  Positive for appetite change, chills, fatigue and fever.   HENT:  Negative for congestion, ear pain and sore throat.    Eyes:  Negative for pain.   Respiratory:  Positive for cough. Negative for chest tightness and shortness of breath.    Cardiovascular:  Negative for chest pain.   Gastrointestinal:  Positive for nausea. Negative for abdominal pain, diarrhea and vomiting.   Genitourinary:  Negative for flank pain and hematuria.   Musculoskeletal:  Negative for joint swelling.   Skin:  Negative for pallor.   Neurological:  Positive for weakness and headaches. Negative for seizures.   All other systems reviewed and are negative.       Physical Exam:  /90 (BP Location: Left arm, Patient Position: Lying)   Pulse 108   Temp 98.4 °F (36.9 °C) (Oral)   Resp 16   Ht 165.1 cm (65\")   Wt 83.4 kg (183 lb 13.8 oz)   SpO2 90%   BMI 30.60 kg/m²     Physical Exam    Vital signs were reviewed under triage note.  General appearance - Patient appears well-developed and well-nourished.  Patient is in no acute distress.  Patient is ill-appearing.  Head - Normocephalic, atraumatic.  Pupils - Equal, round, reactive to light.  Extraocular muscles are intact.  Conjunctiva is clear.  Nasal - Normal inspection.  No evidence of trauma or epistaxis.  Tympanic membranes - Gray, intact without erythema or retractions.  Oral mucosa - Pink and moist without lesions or erythema.  Uvula is midline.  Chest wall - Atraumatic.  Chest wall is nontender.  There are no vesicular rashes noted.  Neck - Supple.  Trachea was midline.  There is no palpable lymphadenopathy or thyromegaly.  There are no meningeal signs  Lungs - Auscultation revealed some rhonchi in the lung bases.  Heart - Tachycardic rate but with a regular " rhythm without any murmurs, clicks, or gallops.  Abdomen - Soft.  Bowel sounds are present.  There is no palpable tenderness.  There is no rebound, guarding, or rigidity.  There are no palpable masses.  There are no pulsatile masses.  Back - Spine is straight and midline.  There is no CVA tenderness.  Extremities - Intact x4 with full range of motion.  There is no palpable edema.  Pulses are intact x4 and equal.  Neurologic - Patient is awake, alert, and oriented x3.  Cranial nerves II through XII are grossly intact.  Motor and sensory functions grossly intact.  Cerebellar function was normal.  Integument - There are no rashes.  There are no petechia or purpura lesions noted.  There are no vesicular lesions noted.          Procedures:  Procedures      Medical Decision Making:      Comorbidities that affect care:    Hypertension, hypothyroidism, malignant thymoma, vitamin B12 deficiency, vitamin D deficiency    External Notes reviewed:    Previous Clinic Note: Office visit with Dr. Serna on 5/15/2025 was reviewed by me.      The following orders were placed and all results were independently analyzed by me:  Orders Placed This Encounter   Procedures    COVID PRE-OP / PRE-PROCEDURE SCREENING ORDER (NO ISOLATION) - Swab, Nasopharynx    COVID-19, FLU A/B, RSV PCR 1 HR TAT - Swab, Nasopharynx    Blood Culture - Blood,    Blood Culture - Blood,    S. Pneumo Ag Urine or CSF - Urine, Urine, Clean Catch    Legionella Antigen, Urine - Urine, Urine, Clean Catch    Mycoplasma Pneumoniae Antibody, IgM - Blood, Arm, Left    MRSA Screen, PCR (Inpatient) - Swab, Nares    Respiratory Culture - Sputum, Cough    Aspergillus Galactomannan Antigen - Blood, Arm, Left    Histoplasma Ag Ur - Urine, Urine, Clean Catch    XR Chest 1 View    CT Chest With Contrast Diagnostic    CT Abdomen Pelvis With Contrast    [place] Draw    Comprehensive Metabolic Panel    High Sensitivity Troponin T    Magnesium    Urinalysis With Microscopic If  Indicated (No Culture) - Urine, Clean Catch    CBC Auto Differential    High Sensitivity Troponin T 1Hr    Urinalysis, Microscopic Only - Urine, Clean Catch    Lactic Acid, Plasma    Procalcitonin    Basic Metabolic Panel    CBC Auto Differential    Procalcitonin    Renal Function Panel    Vancomycin, Random    Fungitell B-D Glucan    Diet: Regular/House; Fluid Consistency: Thin (IDDSI 0)    Undress & Gown    Continuous Pulse Oximetry    Vital Signs    Orthostatic Blood Pressure    Vital Signs    Intake & Output    Weigh Patient    Oral Care    Saline Lock & Maintain IV Access    Place Sequential Compression Device    Maintain Sequential Compression Device    Code Status and Medical Interventions: CPR (Attempt to Resuscitate); Full Support    Hospitalist (on-call MD unless specified)    Inpatient Pulmonology Consult    Oxygen Therapy- Nasal Cannula; Titrate 1-6 LPM Per SpO2; 90% or Greater    POC Glucose Once    ECG 12 Lead Tachycardia    Insert Peripheral IV    Insert Peripheral IV    Initiate Observation Status    Inpatient Admission    Fall Precautions    CBC & Differential    Green Top (Gel)    Lavender Top    Gold Top - SST    Light Blue Top    CBC & Differential    Extra Tubes    Gold Top - SST    Gold Top - SST       Medications Given in the Emergency Department:  Medications   sodium chloride 0.9 % infusion (100 mL/hr Intravenous New Bag 6/1/25 5034)   ammonium lactate (LAC-HYDRIN) 12 % lotion (has no administration in time range)   clotrimazole-betamethasone (LOTRISONE) 1-0.05 % cream 1 Application (1 Application Topical Given 6/2/25 0908)   levothyroxine (SYNTHROID, LEVOTHROID) tablet 50 mcg (50 mcg Oral Given 6/2/25 0537)   miconazole (MICOTIN) 2 % powder 1 Application (1 Application Topical Given 6/2/25 0908)   pravastatin (PRAVACHOL) tablet 10 mg (10 mg Oral Not Given 6/2/25 1009)   cholecalciferol (VITAMIN D3) capsule 50,000 Units (50,000 Units Oral Given 6/2/25 0908)   sodium chloride 0.9 % flush 10  mL (10 mL Intravenous Given 6/2/25 0912)   sodium chloride 0.9 % flush 10 mL (has no administration in time range)   sodium chloride 0.9 % infusion 40 mL (has no administration in time range)   sennosides-docusate (PERICOLACE) 8.6-50 MG per tablet 2 tablet (has no administration in time range)     And   polyethylene glycol (MIRALAX) packet 17 g (has no administration in time range)     And   bisacodyl (DULCOLAX) EC tablet 5 mg (has no administration in time range)     And   bisacodyl (DULCOLAX) suppository 10 mg (has no administration in time range)   ondansetron (ZOFRAN) injection 4 mg (4 mg Intravenous Given 6/1/25 2236)   calcium carbonate (TUMS) chewable tablet 500 mg (200 mg elemental) (has no administration in time range)   sodium chloride 0.9 % infusion (100 mL/hr Intravenous New Bag 6/2/25 1149)   benzonatate (TESSALON) capsule 100 mg (100 mg Oral Given 6/2/25 0536)   guaifenesin (ROBITUSSIN) 100 MG/5ML liquid 200 mg (200 mg Oral Given 6/1/25 2236)   acetaminophen (TYLENOL) tablet 650 mg (650 mg Oral Given 6/2/25 1642)   Pharmacy to dose vancomycin (has no administration in time range)   triamterene-hydrochlorothiazide (MAXZIDE-25) 37.5-25 MG per tablet 0.5 tablet ( Oral Dose Auto Held 6/10/25 0900)   cefepime 2000 mg IVPB in 100 mL NS (VTB) (has no administration in time range)   vancomycin 750 mg in sodium chloride 0.9 % 250 mL IVPB-VTB (has no administration in time range)   enoxaparin sodium (LOVENOX) syringe 40 mg (40 mg Subcutaneous Given 6/2/25 1147)   sepsis fluid LR bolus 2,028 mL (2,028 mL Intravenous New Bag 6/1/25 1459)   cefTRIAXone (ROCEPHIN) in NS 2 GRAMS/20ml IV PUSH syringe (2,000 mg Intravenous Given 6/1/25 1448)   acetaminophen (TYLENOL) tablet 1,000 mg (1,000 mg Oral Given 6/1/25 1502)   ipratropium-albuterol (DUO-NEB) nebulizer solution 3 mL (3 mL Nebulization Given 6/1/25 1511)   iopamidol (ISOVUE-370) 76 % injection 100 mL (100 mL Intravenous Given 6/1/25 1912)   cefepime 2000 mg IVPB  in 100 mL NS (VTB) (2,000 mg Intravenous New Bag 6/2/25 0911)   vancomycin IVPB 1500 mg in 0.9% NaCl (Premix) 500 mL (1,500 mg Intravenous New Bag 6/2/25 1009)        ED Course:    ED Course as of 06/02/25 1959 Mon Jun 02, 2025 1958 EKG performed at 1137 was interpreted by me to show a sinus tachycardia with ventricular at 120 bpm.  The [place] interval is 120 ms.  P waves are normal.  QRS intervals normal.  Axis was at 61 degrees.  There was no acute ischemic ST or T wave change identified.  QT corrected was 441 ms. [TB]      ED Course User Index  [TB] Ernie Montoya DO   The patient was seen and evaluated in the ED by me.  The above history and physical examination was performed as documented.  Diagnostic data was obtained.  Results were reviewed.  Patient was noted to be febrile here.  Patient flagged positive for sepsis and sepsis treatment was initiated.  Patient was found to have a developing pneumonic infiltrate consistent with pneumonia.  I discussed case with our clinical pharmacist regarding treatment based on the patient's allergies and recommendations were for Rocephin only.  This was initiated in the ED.  Patient was also found to be hypoxic and required supplemental oxygen.  Hospitalist service was consulted for admission.    Sepsis criteria was met in the emergency department and the Sepsis protocol (including antibiotic administration) was initiated.      SIRS criteria considered:   1.  Temperature > 100.4 or <96.8    2.  Heart Rate > 90    3.  Respiratory Rate > 22    4.  WBC > 12K or <4K.             Severe Sepsis:     Respiratory: Mechanical Ventilation or Bipap  Hypotension: SBP > 90 or MAP < 65  Renal: Creatinine > 2  Metabolic: Lactic Acid > 2  Hematologic: Platelets < 100K or INR > 1.5  Hepatic: BILI  >  2  CNS: Sudden AMS     Septic Shock:     Severe Sepsis + Persistent hypotension or Lactic Acid > 4     Normal saline bolus, Antibiotics, and final disposition was based on these definitions.         Sepsis was recognized at 1355    Antibiotics were ordered.     30 mL/kg bolus was indicated.      The patient was ordered 2020 mL of fluids.  Patient was reassessed after fluid bolus.    The patient presents with 2 out of the 4 SIRS criteria and a suspected source for sepsis.  Patient was evaluated and placed on a cardiac monitor for fear of worsening tachycardia and life-threatening hypotension.  Patient was monitored for shock and signs of end-organ damage.  Mental status was repeatedly checked throughout the ED stay.  Medications were ordered by me which includes ceftriaxone.  The case was discussed at length with admitting physician.     Total Critical Care time of 45 minutes. Total critical care time documented does not include time spent on separately billed procedures for services of nurses or physician assistants. I personally saw and examined the patient. I have reviewed all diagnostic interpretations and treatment plans as written. I was present for the key portions of any procedures performed and the inclusive time noted in any critical care statement. Critical care time includes patient management by me, time spent at the patients bedside,  time to review lab and imaging results, discussing patient care, documentation in the medical record, and time spent with family or caregiver.      Labs:    Lab Results (last 24 hours)       Procedure Component Value Units Date/Time    Basic Metabolic Panel [[id number]]  (Abnormal) Collected: 06/02/25 0553    Specimen: Blood from Arm, Left Updated: 06/02/25 0637     Glucose 108 mg/dL      BUN 15.8 mg/dL      Creatinine 1.13 mg/dL      Sodium 135 mmol/L      Potassium 3.7 mmol/L      Comment: Slight hemolysis detected by analyzer. Result may be falsely elevated.        Chloride 102 mmol/L      CO2 23.4 mmol/L      Calcium 8.1 mg/dL      BUN/Creatinine Ratio 14.0     Anion Gap 9.6 mmol/L      eGFR 55.1 mL/min/1.73     Narrative:      GFR Categories in Chronic Kidney  Disease (CKD)              GFR Category          GFR (mL/min/1.73)    Interpretation  G1                    90 or greater        Normal or high (1)  G2                    60-89                Mild decrease (1)  G3a                   45-59                Mild to moderate decrease  G3b                   30-44                Moderate to severe decrease  G4                    15-29                Severe decrease  G5                    14 or less           Kidney failure    (1)In the absence of evidence of kidney disease, neither GFR category G1 or G2 fulfill the criteria for CKD.    eGFR calculation 2021 CKD-EPI creatinine equation, which does not include race as a factor    CBC & Differential [[id number]]  (Abnormal) Collected: 06/02/25 0553    Specimen: Blood from Arm, Left Updated: 06/02/25 0623    Narrative:      The following orders were created for panel order CBC & Differential.  Procedure                               Abnormality         Status                     ---------                               -----------         ------                     CBC Auto Differential[[id number]]        Abnormal            Final result                 Please view results for these tests on the individual orders.    CBC Auto Differential [[id number]]  (Abnormal) Collected: 06/02/25 0553    Specimen: Blood from Arm, Left Updated: 06/02/25 0623     WBC 12.82 10*3/mm3      RBC 4.44 10*6/mm3      Hemoglobin 13.6 g/dL      Hematocrit 41.5 %      MCV 93.5 fL      MCH 30.6 pg      MCHC 32.8 g/dL      RDW 13.2 %      RDW-SD 45.2 fl      MPV 9.3 fL      Platelets 252 10*3/mm3      Neutrophil % 83.9 %      Lymphocyte % 9.9 %      Monocyte % 3.6 %      Eosinophil % 0.5 %      Basophil % 0.9 %      Immature Grans % 1.2 %      Neutrophils, Absolute 10.76 10*3/mm3      Lymphocytes, Absolute 1.27 10*3/mm3      Monocytes, Absolute 0.46 10*3/mm3      Eosinophils, Absolute 0.06 10*3/mm3      Basophils, Absolute 0.11 10*3/mm3      Immature Grans,  "Absolute 0.16 10*3/mm3      nRBC 0.0 /100 WBC     Procalcitonin [[id number]]  (Abnormal) Collected: 06/02/25 0553    Specimen: Blood from Arm, Left Updated: 06/02/25 0804     Procalcitonin 0.29 ng/mL     Narrative:      As a Marker for Sepsis (Non-Neonates):    1. <0.5 ng/mL represents a low risk of severe sepsis and/or septic shock.  2. >2 ng/mL represents a high risk of severe sepsis and/or septic shock.    As a Marker for Lower Respiratory Tract Infections that require antibiotic therapy:    PCT on Admission    Antibiotic Therapy       6-12 Hrs later    >0.5                Strongly Recommended  >0.25 - <0.5        Recommended  0.1 - 0.25          Discouraged              Remeasure/reassess PCT  <0.1                Strongly Discouraged     Remeasure/reassess PCT    As 28 day mortality risk marker: \"Change in Procalcitonin Result\" (>80% or <=80%) if Day 0 (or Day 1) and Day 4 values are available. Refer to http://www.[employer]-pct-calculator.com    Change in PCT <=80%  A decrease of PCT levels below or equal to 80% defines a positive change in PCT test result representing a higher risk for 28-day all-cause mortality of patients diagnosed with severe sepsis for septic shock.    Change in PCT >80%  A decrease of PCT levels of more than 80% defines a negative change in PCT result representing a lower risk for 28-day all-cause mortality of patients diagnosed with severe sepsis or septic shock.    This test is Prognostic not Diagnostic, if elevated correlate with clinical findings before administering antibiotic treatment.        Fungitell B-D Glucan [[id number]] Collected: 06/02/25 0905    Specimen: Blood from Arm, Left Updated: 06/02/25 0909    Aspergillus Galactomannan Antigen - Blood, Arm, Left [[id number]] Collected: 06/02/25 0905    Specimen: Blood from Arm, Left Updated: 06/02/25 0909    MRSA Screen, PCR (Inpatient) - Swab, Nares [[id number]]  (Normal) Collected: 06/02/25 1245    Specimen: Swab from Nares Updated: " 06/02/25 1414     MRSA PCR No MRSA Detected    Narrative:      The negative predictive value of this diagnostic test is high and should only be used to consider de-escalating anti-MRSA therapy. A positive result may indicate colonization with MRSA and must be correlated clinically.             Imaging:    No Radiology Exams Resulted Within Past 24 Hours        Differential Diagnosis and Discussion:    Fever: Based on the complaint of fever, differential diagnosis includes but is not limited to meningitis, pneumonia, pyelonephritis, acute uti,  systemic immune response syndrome, sepsis, viral syndrome, fungal infection, tick born illness and other bacterial infections.  Weakness: Based on the patient's history, signs, and symptoms, the diffential diagnosis includes but is not limited to meningitis, stroke, sepsis, subarachnoid hemorrhage, intracranial bleeding, encephalitis, acute uti, dehydration, MS, myasthenia gravis, Gu[place]an [place], migraine variant, neuromuscular disorders vertigo, electrolyte imbalance, and metabolic disorders.    Labs were collected in the emergency department and all labs were reviewed and interpreted by me.  X-ray were performed in the emergency department and all X-ray impressions were independently interpreted by me.  An EKG was performed and the EKG was interpreted by me.  CT scan was performed in the emergency department and the CT scan radiology impression was interpreted by me.    MDM     Amount and/or Complexity of Data Reviewed  Clinical lab tests: reviewed  Tests in the radiology section of CPT®: reviewed  Tests in the medicine section of CPT®: reviewed  Decide to obtain previous medical records or to obtain history from someone other than the patient: yes         Total Critical Care time of 45 minutes. I provided the majority of the critical care time. Total critical care time documented does not include time spent on separately billed procedures for services of nurses or physician  assistants. I have reviewed all diagnostic interpretations and treatment plans as written. I was present for the key portions of any procedures performed and the inclusive time noted in any critical care statement. Critical care time includes patient management by me and the TON, time spent at the patients bedside,  time to review lab and imaging results, discussing patient care, documentation in the medical record, and time spent with family or caregiver.    Patient Care Considerations:          Consultants/Shared Management Plan:    Hospitalist: I have discussed the case with Dr. Molina who agrees to accept the patient for admission.    Social Determinants of Health:    Patient is independent, reliable, and has access to care.       Disposition and Care Coordination:    Admit:   Through independent evaluation of the patient's history, physical, and imperical data, the patient meets criteria for inpatient admission to the hospital.        Final diagnoses:   Pneumonia of left lower lobe due to infectious organism   Sepsis with acute hypoxic respiratory failure without septic shock, due to unspecified organism        ED Disposition       ED Disposition   Decision to Admit    Condition   --    Comment   Level of Care: Telemetry [5]   Diagnosis: Weakness [[id number]]   Admitting Physician: LUZ MOLINA [[id number]]   Attending Physician: LUZ MOLINA [[id number]]                 This medical record created using voice recognition software.             Ernie Montoya DO  06/02/25 1959

## 2025-06-01 NOTE — H&P
[facility]  Date: 2025   Patient Name: Oksana Smith  : 1960  MRN: [id number]  Primary Care Physician:  Portia Serna MD  Date of admission: 2025    Subjective   Subjective     Chief Complaint: weakness/fatigue    HPI:    Oksana Smith is a 64 y.o. female with a history of follicular lymphoma about 6 years ago who presented with 4 days of weakness, nausea, fatigue.  Patient presented to the emergency department 2 days ago and was sent home and felt better for a day but for the last 24 hours is felt terrible.  Nausea with no vomiting.  Mild shortness of breath.  Generalized weakness and fatigue.  She reports mild abdominal pain diffusely.  Normal bowel movements and no urinary complaints.      Personal History     Past Medical History:  Past Medical History:   Diagnosis Date    Atypical ductal hyperplasia of right breast     Breast lump     Cancer     Chronic cough     Colon polyp     Constipation     Early satiety     Esophageal dysphagia     Fatigue     Hemidiaphragm paralysis     HTN (hypertension)     Hypothyroidism     Lateral epicondylitis of left elbow 2018    Lymphoma     OM (otitis media)     PONV (postoperative nausea and vomiting)     Rectal bleeding     Right knee pain 2018    Shortness of breath     Tear of PCL (posterior cruciate ligament) of knee, right, initial encounter 2018    Thymoma, malignant     Thyroid disorder     Vitamin B12 deficiency     Vitamin D deficiency        Past Surgical History:  Past Surgical History:   Procedure Laterality Date    BREAST LUMPECTOMY Right     COLONOSCOPY      Dr. Roque    COLONOSCOPY N/A 05/10/2024    Procedure: COLONOSCOPY WITH POLYPECTOMY;  Surgeon: Ayden Roque MD;  Location: [facility] ENDOSCOPY;  Service: Gastroenterology;  Laterality: N/A;  COLON POLYP    ENDOSCOPY  2018    Dr. Roque    HYSTERECTOMY  1994    STOMACH SURGERY      biopsy     TONSILLECTOMY  1967       Family History:   Family History   Problem Relation Age of Onset    Breast cancer Mother     Heart disease Mother     Cancer Mother     Arthritis Mother     Heart disease Father     Arthritis Father     Diabetes Sister     Colon cancer Neg Hx        Social History:   Social History     Socioeconomic History    Marital status:    Tobacco Use    Smoking status: Never     Passive exposure: Never    Smokeless tobacco: Never   Vaping Use    Vaping status: Never Used   Substance and Sexual Activity    Alcohol use: Never    Drug use: Never    Sexual activity: Defer       Home Medications:  ammonium lactate, clotrimazole-betamethasone, levothyroxine, nystatin, pravastatin, triamterene-hydrochlorothiazide, and vitamin D    Allergies:  Allergies   Allergen Reactions    Clindamycin Hives, Itching and Nausea And Vomiting    Erythromycin Hives, Itching, GI Intolerance and Unknown - High Severity    Oxycodone-Acetaminophen Other (See Comments)     hallucinations       Objective   Objective     Vitals:   Temp:  [99.1 °F (37.3 °C)-104.7 °F (40.4 °C)] 99.1 °F (37.3 °C)  Heart Rate:  [110-116] 110  Resp:  [16-18] 16  BP: (108-121)/(66) 108/66      Result Review    Result Review:  I have personally reviewed the results from the time of this admission to 6/1/2025 17:40 EDT and agree with these findings:  [x]  Laboratory  [x]  Microbiology  [x]  Radiology  []  EKG/Telemetry   []  Cardiology/Vascular   []  Pathology  []  Old records  []  Other:      Assessment & Plan   Assessment / Plan     Assessment/Plan:     Nausea/fatigue/weakness  Patient has a history of follicular lymphoma status posttreatment.  She sees her hematologist every 6 months and has a wait-and-see approach.  Reports only intervention if she develops B symptoms.  To me, fever, fatigue, weakness and generalized abdominal pain is concerning for recurrence.  Will hydrate and treat with N-acetylcysteine.  Will do CT chest, abdomen,  pelvis.  PET scan done in October showed  Hypermetabolic lymph nodes in the mesentery and retrocaval region possibly reactive in nature or low-grade lymphoma.   - X-ray showed small focal opacity in the lungs concerning for pneumonia.  Will start Unasyn.  Doubt this opacity could cause degree of her symptoms.  Discussed with ED provider regarding need for hospitalization.    Went back to discussed case with family.  Discussed risks and benefits versus doing CT with mildly elevated creatinine.  Will start hydration.  Family would like to know the answer.  To me does not seem the degree of her illness could be explained by simple pneumonia.  Given her history of lymphoma family would like to proceed with CT.    VTE Prophylaxis:  Mechanical VTE prophylaxis orders are signed & held.          CODE STATUS:    Code Status (Patient has no pulse and is not breathing): CPR (Attempt to Resuscitate)  Medical Interventions (Patient has pulse or is breathing): Full Support  Level Of Support Discussed With: Patient      Admission Status:  I believe this patient meets obs status.    Electronically signed by eNlson Lyons DO, 06/01/25, 5:40 PM EDT.

## 2025-06-01 NOTE — CASE MANAGEMENT/SOCIAL WORK
Discharge Planning Assessment   [facility]     Patient Name: Oksana Smith  MRN: [id number]  Today's Date: 6/1/2025    Admit Date: 6/1/2025        Discharge Needs Assessment       Row Name 06/01/25 1836       Living Environment    People in Home spouse    Name(s) of People in Home Erick Smith, .    Current Living Arrangements home    Potentially Unsafe Housing Conditions none    In the past 12 months has the electric, gas, oil, or water company threatened to shut off services in your home? No    Primary Care Provided by self    Provides Primary Care For no one    Family Caregiver if Needed spouse    Family Caregiver Names Erick Smith,     Quality of Family Relationships supportive    Able to Return to Prior Arrangements yes       Resource/Environmental Concerns    Transportation Concerns none       Transportation Needs    In the past 12 months, has lack of transportation kept you from medical appointments or from getting medications? no    In the past 12 months, has lack of transportation kept you from meetings, work, or from getting things needed for daily living? No       Food Insecurity    Within the past 12 months, you worried that your food would run out before you got the money to buy more. Never true    Within the past 12 months, the food you bought just didn't last and you didn't have money to get more. Never true       Transition Planning    Patient/Family Anticipates Transition to home    Patient/Family Anticipated Services at Transition none    Transportation Anticipated family or friend will provide       Discharge Needs Assessment    Readmission Within the Last 30 Days no previous admission in last 30 days    Equipment Currently Used at Home none    Concerns to be Addressed no discharge needs identified    Do you want help finding or keeping work or a job? I do not need or want help    Do you want help with school or training? For example, starting or completing job training or  getting a high school diploma, GED or equivalent No    Anticipated Changes Related to Illness none                   Discharge Plan    No documentation.                      Demographic Summary       Row Name 06/01/25 1834       General Information    Admission Type inpatient    Arrived From home    Referral Source emergency department    Reason for Consult discharge planning    Preferred Language English                   Functional Status       Row Name 06/01/25 1835       Functional Status    Usual Activity Tolerance good    Current Activity Tolerance poor       Physical Activity    On average, how many days per week do you engage in moderate to strenuous exercise (like a brisk walk)? 3 days    On average, how many minutes do you engage in exercise at this level? 30 min    Number of minutes of exercise per week 90       Functional Status, IADL    Medications independent    Meal Preparation independent    Laundry independent    Shopping independent    If for any reason you need help with day-to-day activities such as bathing, preparing meals, shopping, managing finances, etc., do you get the help you need? I don't need any help       Mental Status    General Appearance WDL WDL       Mental Status Summary    Recent Changes in Mental Status/Cognitive Functioning no changes       Employment/    Employment Status retired    Current or Previous Occupation desk job                   Psychosocial       Row Name 06/01/25 1839       Mental Health    Little interest or pleasure in doing things Not at all    Feeling down, depressed, or hopeless Not at all       Stress    Do you feel stress - tense, restless, nervous, or anxious, or unable to sleep at night because your mind is troubled all the time - these days? Not at all                   Abuse/Neglect       Row Name 06/01/25 1836       Personal Safety    Feels Unsafe at Home or Work/School no    Feels Threatened by Someone no    Does Anyone Try to Keep You From  Having Contact with Others or Doing Things Outside Your Home? no    Physical Signs of Abuse Present no                   Legal       Row Name 06/01/25 1836       Financial Resource Strain    How hard is it for you to pay for the very basics like food, housing, medical care, and heating? Not hard       Financial/Legal    Source of Income pension/prison    Financial/Environmental Concerns none    Application for Public Assistance not applied       Legal    Criminal Activity/Legal Involvement none                   Substance Abuse    No documentation.                  Patient Forms    No documentation.                 SW met with Pt at bedside, along with spouse Erick. Pt resides at home with her spouse. Pt reports she provides all care for herself independently with no concerns. PT has no assistive devices currently and reports not needing any at this time. Pt reports she intends to discharge home. Pt reports her PCP is Portia Serna. Pt's preferred pharmacy is [facility].     EMANUEL Bojoruqez

## 2025-06-02 LAB
ANION GAP SERPL CALCULATED.3IONS-SCNC: 9.6 MMOL/L (ref 5–15)
BASOPHILS # BLD AUTO: 0.11 10*3/MM3 (ref 0–0.2)
BASOPHILS NFR BLD AUTO: 0.9 % (ref 0–1.5)
BUN SERPL-MCNC: 15.8 MG/DL (ref 8–23)
BUN/CREAT SERPL: 14 (ref 7–25)
CALCIUM SPEC-SCNC: 8.1 MG/DL (ref 8.6–10.5)
CHLORIDE SERPL-SCNC: 102 MMOL/L (ref 98–107)
CO2 SERPL-SCNC: 23.4 MMOL/L (ref 22–29)
CREAT SERPL-MCNC: 1.13 MG/DL (ref 0.57–1)
DEPRECATED RDW RBC AUTO: 45.2 FL (ref 37–54)
EGFRCR SERPLBLD CKD-EPI 2021: 55.1 ML/MIN/1.73
EOSINOPHIL # BLD AUTO: 0.06 10*3/MM3 (ref 0–0.4)
EOSINOPHIL NFR BLD AUTO: 0.5 % (ref 0.3–6.2)
ERYTHROCYTE [DISTWIDTH] IN BLOOD BY AUTOMATED COUNT: 13.2 % (ref 12.3–15.4)
GLUCOSE SERPL-MCNC: 108 MG/DL (ref 65–99)
HCT VFR BLD AUTO: 41.5 % (ref 34–46.6)
HGB BLD-MCNC: 13.6 G/DL (ref 12–15.9)
HOLD SPECIMEN: NORMAL
HOLD SPECIMEN: NORMAL
IMM GRANULOCYTES # BLD AUTO: 0.16 10*3/MM3 (ref 0–0.05)
IMM GRANULOCYTES NFR BLD AUTO: 1.2 % (ref 0–0.5)
L PNEUMO1 AG UR QL IA: NEGATIVE
LYMPHOCYTES # BLD AUTO: 1.27 10*3/MM3 (ref 0.7–3.1)
LYMPHOCYTES NFR BLD AUTO: 9.9 % (ref 19.6–45.3)
M PNEUMO IGM SER QL: NEGATIVE
MCH RBC QN AUTO: 30.6 PG (ref 26.6–33)
MCHC RBC AUTO-ENTMCNC: 32.8 G/DL (ref 31.5–35.7)
MCV RBC AUTO: 93.5 FL (ref 79–97)
MONOCYTES # BLD AUTO: 0.46 10*3/MM3 (ref 0.1–0.9)
MONOCYTES NFR BLD AUTO: 3.6 % (ref 5–12)
MRSA DNA SPEC QL NAA+PROBE: NORMAL
NEUTROPHILS NFR BLD AUTO: 10.76 10*3/MM3 (ref 1.7–7)
NEUTROPHILS NFR BLD AUTO: 83.9 % (ref 42.7–76)
NRBC BLD AUTO-RTO: 0 /100 WBC (ref 0–0.2)
PLATELET # BLD AUTO: 252 10*3/MM3 (ref 140–450)
PMV BLD AUTO: 9.3 FL (ref 6–12)
POTASSIUM SERPL-SCNC: 3.7 MMOL/L (ref 3.5–5.2)
PROCALCITONIN SERPL-MCNC: 0.29 NG/ML (ref 0–0.25)
RBC # BLD AUTO: 4.44 10*6/MM3 (ref 3.77–5.28)
S PNEUM AG SPEC QL LA: NEGATIVE
SODIUM SERPL-SCNC: 135 MMOL/L (ref 136–145)
WBC NRBC COR # BLD AUTO: 12.82 10*3/MM3 (ref 3.4–10.8)

## 2025-06-02 PROCEDURE — 25010000002 CEFEPIME PER 500 MG: Performed by: INTERNAL MEDICINE

## 2025-06-02 PROCEDURE — 99233 SBSQ HOSP IP/OBS HIGH 50: CPT | Performed by: INTERNAL MEDICINE

## 2025-06-02 PROCEDURE — 87305 ASPERGILLUS AG IA: CPT | Performed by: NURSE PRACTITIONER

## 2025-06-02 PROCEDURE — 25010000002 VANCOMYCIN 5 G RECONSTITUTED SOLUTION: Performed by: INTERNAL MEDICINE

## 2025-06-02 PROCEDURE — 25010000002 ENOXAPARIN PER 10 MG: Performed by: INTERNAL MEDICINE

## 2025-06-02 PROCEDURE — 25810000003 SODIUM CHLORIDE 0.9 % SOLUTION: Performed by: INTERNAL MEDICINE

## 2025-06-02 PROCEDURE — 85025 COMPLETE CBC W/AUTO DIFF WBC: CPT | Performed by: INTERNAL MEDICINE

## 2025-06-02 PROCEDURE — 87449 NOS EACH ORGANISM AG IA: CPT | Performed by: NURSE PRACTITIONER

## 2025-06-02 PROCEDURE — 80048 BASIC METABOLIC PNL TOTAL CA: CPT | Performed by: INTERNAL MEDICINE

## 2025-06-02 PROCEDURE — 25810000003 SODIUM CHLORIDE 0.9 % SOLUTION 250 ML FLEX CONT: Performed by: INTERNAL MEDICINE

## 2025-06-02 PROCEDURE — 25010000002 AMPICILLIN-SULBACTAM PER 1.5 G: Performed by: INTERNAL MEDICINE

## 2025-06-02 PROCEDURE — 87641 MR-STAPH DNA AMP PROBE: CPT | Performed by: INTERNAL MEDICINE

## 2025-06-02 PROCEDURE — 84145 PROCALCITONIN (PCT): CPT | Performed by: INTERNAL MEDICINE

## 2025-06-02 PROCEDURE — 25010000002 VANCOMYCIN 750 MG RECONSTITUTED SOLUTION 1 EACH VIAL: Performed by: INTERNAL MEDICINE

## 2025-06-02 PROCEDURE — 99223 1ST HOSP IP/OBS HIGH 75: CPT | Performed by: STUDENT IN AN ORGANIZED HEALTH CARE EDUCATION/TRAINING PROGRAM

## 2025-06-02 RX ORDER — ENOXAPARIN SODIUM 100 MG/ML
40 INJECTION SUBCUTANEOUS DAILY
Status: DISCONTINUED | OUTPATIENT
Start: 2025-06-02 | End: 2025-06-05 | Stop reason: HOSPADM

## 2025-06-02 RX ORDER — VANCOMYCIN/0.9 % SOD CHLORIDE 1.5G/250ML
1500 PLASTIC BAG, INJECTION (ML) INTRAVENOUS ONCE
Status: COMPLETED | OUTPATIENT
Start: 2025-06-02 | End: 2025-06-02

## 2025-06-02 RX ORDER — TRIAMTERENE AND HYDROCHLOROTHIAZIDE 37.5; 25 MG/1; MG/1
0.5 TABLET ORAL DAILY
Status: DISCONTINUED | OUTPATIENT
Start: 2025-06-02 | End: 2025-06-05 | Stop reason: HOSPADM

## 2025-06-02 RX ADMIN — CHOLECALCIFEROL CAP 1.25 MG (50000 UNIT) 50000 UNITS: 1.25 CAP at 09:08

## 2025-06-02 RX ADMIN — MICONAZOLE NITRATE 1 APPLICATION: 2 POWDER TOPICAL at 09:08

## 2025-06-02 RX ADMIN — Medication 10 ML: at 09:12

## 2025-06-02 RX ADMIN — CLOTRIMAZOLE AND BETAMETHASONE DIPROPIONATE 1 APPLICATION: 10; .5 CREAM TOPICAL at 09:08

## 2025-06-02 RX ADMIN — SODIUM CHLORIDE 100 ML/HR: 9 INJECTION, SOLUTION INTRAVENOUS at 11:49

## 2025-06-02 RX ADMIN — CEFEPIME 2000 MG: 2 INJECTION, POWDER, FOR SOLUTION INTRAVENOUS at 09:11

## 2025-06-02 RX ADMIN — VANCOMYCIN HYDROCHLORIDE 750 MG: 750 INJECTION, POWDER, LYOPHILIZED, FOR SOLUTION INTRAVENOUS at 22:58

## 2025-06-02 RX ADMIN — BENZONATATE 100 MG: 100 CAPSULE ORAL at 05:36

## 2025-06-02 RX ADMIN — SODIUM CHLORIDE 1500 MG: 9 INJECTION, SOLUTION INTRAVENOUS at 10:09

## 2025-06-02 RX ADMIN — Medication 10 ML: at 22:58

## 2025-06-02 RX ADMIN — ACETAMINOPHEN 650 MG: 325 TABLET ORAL at 07:24

## 2025-06-02 RX ADMIN — AMPICILLIN AND SULBACTAM 3 G: 2; 1 INJECTION, POWDER, FOR SOLUTION INTRAVENOUS at 04:12

## 2025-06-02 RX ADMIN — LEVOTHYROXINE SODIUM 50 MCG: 0.05 TABLET ORAL at 05:37

## 2025-06-02 RX ADMIN — ENOXAPARIN SODIUM 40 MG: 40 INJECTION SUBCUTANEOUS at 11:47

## 2025-06-02 RX ADMIN — ACETAMINOPHEN 650 MG: 325 TABLET ORAL at 16:42

## 2025-06-02 NOTE — CONSULTS
Pulmonary / Critical Care Consult Note      Patient Name: Oksana Smith  : 1960  MRN: [id number]  Primary Care Physician:  Portia Serna MD  Referring Physician: Ryder Esparza MD  Date of admission: 2025    Subjective   Subjective     Reason for Consult/ Chief Complaint:   Multifocal pneumonia    HPI:  Oksana Smith is a 64 y.o. female with history of follicular lymphoma without treatment, presented to the ED with cough, fever, shortness of breath, sputum production, weakness and fatigue.  Symptoms have been ongoing for 2 to 3 days.  Symptoms worsened and was taken to the ED for further evaluation.  In the ED patient was febrile, Tmax 104.7.  Initial labs showed creatinine 1.37.  Procalcitonin 0.27.  WBC 14.4.  Chest CT showed bilateral right greater than left multifocal pneumonia.  She was initiated on broad-spectrum antibiotics.  This morning patient is doing better.  She remains on room air.  She still has a cough that is mostly nonproductive.  She told me she was diagnosed with lymphoma but was not diagnosed and was just being monitored.    Review of Systems  Constitutional symptoms: + Fevers, chills denied complaints   Cardiovascular:  Denied complaints  Respiratory: + Cough, sputum production; denied complaints  Gastrointestinal: Denied complaints  Neurologic: Denied complaints    Personal History     Past Medical History:   Diagnosis Date    Atypical ductal hyperplasia of right breast     Breast lump     Cancer     Chronic cough     Colon polyp     Constipation     Early satiety     Esophageal dysphagia     Fatigue     Hemidiaphragm paralysis     HTN (hypertension)     Hypothyroidism     Lateral epicondylitis of left elbow 2018    Lymphoma     OM (otitis media)     PONV (postoperative nausea and vomiting)     Rectal bleeding     Right knee pain 2018    Shortness of breath     Tear of PCL (posterior cruciate ligament) of knee, right, initial encounter  08/30/2018    Thymoma, malignant     Thyroid disorder     Vitamin B12 deficiency     Vitamin D deficiency        Past Surgical History:   Procedure Laterality Date    BREAST LUMPECTOMY Right     COLONOSCOPY      Dr. Roque    COLONOSCOPY N/A 05/10/2024    Procedure: COLONOSCOPY WITH POLYPECTOMY;  Surgeon: Ayden Roque MD;  Location: [facility] ENDOSCOPY;  Service: Gastroenterology;  Laterality: N/A;  COLON POLYP    ENDOSCOPY  09/11/2018    Dr. Roque    HYSTERECTOMY  1994    STOMACH SURGERY      biopsy    TONSILLECTOMY  1967       Family History: family history includes Arthritis in her father and mother; Breast cancer in her mother; Cancer in her mother; Diabetes in her sister; Heart disease in her father and mother. Otherwise pertinent FHx was reviewed and not pertinent to current issue.    Social History:  reports that she has never smoked. She has never been exposed to tobacco smoke. She has never used smokeless tobacco. She reports that she does not drink alcohol and does not use drugs.    Home Medications:  ammonium lactate, clotrimazole-betamethasone, levothyroxine, nystatin, pravastatin, triamterene-hydrochlorothiazide, and vitamin D    Allergies:  Allergies   Allergen Reactions    Clindamycin Hives, Itching and Nausea And Vomiting    Erythromycin Hives, Itching, GI Intolerance and Unknown - High Severity    Oxycodone-Acetaminophen Hallucinations       Objective    Objective     Vitals:   Temp:  [98.6 °F (37 °C)-104.7 °F (40.4 °C)] 99.7 °F (37.6 °C)  Heart Rate:  [] 109  Resp:  [16-18] 16  BP: ()/(56-77) 127/77  Flow (L/min) (Oxygen Therapy):  [2] 2    Physical Exam:  Vital Signs Reviewed   General:  Alert, NAD. Lying in bed.    HEENT:  PERRL, EOMI.  OP  Neck:  Supple, no JVD, no thyromegaly  Chest:  good aeration, no work of breathing noted on room air  CV: RRR, no MGR, pulses 2+, equal.  Abd:  Soft, NT, ND, + BS  EXT:  no clubbing, no cyanosis, no edema, no joint tenderness  Neuro:  A&Ox3,  CN grossly intact, no focal deficits.  Skin: No rashes or lesions noted    Result Review    Result Review:  I have personally reviewed the results from the time of this admission to 6/2/2025 11:08 EDT and agree with these findings:  []  Laboratory  []  Microbiology  []  Radiology  []  EKG/Telemetry   []  Cardiology/Vascular   []  Pathology  []  Old records  []  Other:    Most notable findings include:   -     Assessment & Plan   Assessment / Plan     Active Hospital Problems:  Active Hospital Problems    Diagnosis     **Weakness          Impression:  Multifocal pneumonia, unspecified organism  Elevated procalcitonin  Questionable acute kidney injury  Leukocytosis  Abnormal chest CT  History of lymphoma  Therapeutic drug monitoring of vancomycin    Plan:  Can use supplemental oxygen as needed to keep SpO2 greater than 90%  CT of chest personally reviewed.  Bilateral right greater than left dense consolidations of lower lobes consistent with multifocal pneumonia  Continue broad-spectrum antibiotic with vancomycin and cefepime.  De-escalate based on cultures.  Will check respiratory viral panel, strep pneumo, Legionella  Check lactic acid  Check blood cultures  Will check fungal studies given immunocompromise state  Start nebs and bronchopulmonary hygiene  Will consider bronchoscopy if patient does not improve in the next few days    VTE Prophylaxis:  Pharmacologic & mechanical VTE prophylaxis orders are present.         Code Status and Medical Interventions: CPR (Attempt to Resuscitate); Full Support   Ordered at: 06/01/25 1739     Code Status (Patient has no pulse and is not breathing):    CPR (Attempt to Resuscitate)     Medical Interventions (Patient has pulse or is breathing):    Full Support     Level Of Support Discussed With:    Patient        Labs, imaging, notes, and medications personally reviewed.  Thank you for involving me in the care of this patient.  Electronically signed by Richelle Vines MD, 06/02/25,  11:08 AM EDT.

## 2025-06-02 NOTE — PLAN OF CARE
Goal Outcome Evaluation:  Plan of Care Reviewed With: patient        Progress: no change  Outcome Evaluation: Pt slept after midnight, remains SOB with exertion. Tessalon pearls effective in reducing cough. Denies any discomforts this am other than weakness.

## 2025-06-02 NOTE — PROGRESS NOTES
"[facility] Clinical Pharmacy Services: Vancomycin Pharmacokinetic Initial Consult Note    Oksana Smith is a 64 y.o. female who is on day 1 of pharmacy to dose vancomycin for Pneumonia.    Consult Information  Consulting Provider: SHUKRI Esparza  Planned Duration of Therapy: 7 days  Was Patient Receiving Prior to Admission/Consult?: No  Loading Dose Ordered or Given: 1500 mg on 6/2 at 0900  PK/PD Target: -600 mg/L.hr   Relevant ID History: Hx of follicular lymphoma ~6 years ago. S/p treatment, currently with \"wait and see\" approach.   Other Antimicrobials: Cefepime    Imaging Reviewed?: Yes  CT Chest with contrast 6/1 @ 1915  IMPRESSION:  Dense pulmonary consolidation is present within the lower lobe of the right lung and is most suggestive of pneumonia. Consider close interval clinical and follow-up to ensure a benign progression and to exclude an underlying malignant process. Mild atelectasis and/or pneumonia in the  left lung base is (are) possible. Please see above comments for further detail.    Microbiology Data  MRSA PCR performed: 06/02/25; Result: Pending  Culture/Source:   Microbiology Results (last 10 days)       Procedure Component Value - Date/Time    COVID PRE-OP / PRE-PROCEDURE SCREENING ORDER (NO ISOLATION) - Swab, Nasopharynx [[id number]]  (Normal) Collected: 06/01/25 1043    Lab Status: Final result Specimen: Swab from Nasopharynx Updated: 06/01/25 1209    Narrative:      The following orders were created for panel order COVID PRE-OP / PRE-PROCEDURE SCREENING ORDER (NO ISOLATION) - Swab, Nasopharynx.  Procedure                               Abnormality         Status                     ---------                               -----------         ------                     COVID-19, FLU A/B, RSV P...[[id number]]  Normal              Final result                 Please view results for these tests on the individual orders.    COVID-19, FLU A/B, RSV PCR 1 HR TAT - Swab, Nasopharynx [[id number]] " " (Normal) Collected: 25 1043    Lab Status: Final result Specimen: Swab from Nasopharynx Updated: 25 1209     COVID19 Not Detected     Influenza A PCR Not Detected     Influenza B PCR Not Detected     RSV, PCR Not Detected    Narrative:      Fact sheet for providers: https://www.fda.gov/media/117613/download    Fact sheet for patients: https://www.[employer].gov/media/860019/download    Test performed by PCR.    COVID-19, FLU A/B, RSV PCR 1 HR TAT - Swab, Nasopharynx [[id number]]  (Normal) Collected: 25 1154    Lab Status: Final result Specimen: Swab from Nasopharynx Updated: 25 1243     COVID19 Not Detected     Influenza A PCR Not Detected     Influenza B PCR Not Detected     RSV, PCR Not Detected    Narrative:      Fact sheet for providers: https://www.[employer].gov/media/024793/download    Fact sheet for patients: https://www.[employer].gov/media/081067/download    Test performed by PCR.              Vitals/Labs  Ht: 165.1 cm (65\"); Wt: 83.4 kg (183 lb 13.8 oz)  Temp (24hrs), Av.3 °F (37.9 °C), Min:98.6 °F (37 °C), Max:104.7 °F (40.4 °C)   Estimated Creatinine Clearance: 55.1 mL/min (A) (by C-G formula based on SCr of 1.13 mg/dL (H)).     Results from last 7 days   Lab Units 25  0553 25  1044 25  1218 25  1111   CREATININE mg/dL 1.13* 1.37* 1.06*  --    WBC 10*3/mm3 12.82* 14.40*  --  14.46*     Assessment/Plan:    Vancomycin Dose: 750 mg IV every 12 hours; which provides the following predicted parameters:  Exposure target: AUC24 (range) 400-600 mg/L.hr   AUC24,ss: 506 mg/L.hr  Probability of AUC24 > 400: 75 %  Ctrough,ss: 17.3 mg/L  Probability of Ctrough,ss > 20: 35 %  Probability of nephrotoxicity (Lodise ROSELIA ): 13 %  Vanc Random ordered for 6/3 at 0600  Patient has order for Renal Function Panel    Pharmacy will follow patient's kidney function and will adjust doses and obtain levels as necessary. Thank you for involving pharmacy in this patient's care. Please contact " pharmacy with any questions or concerns.                           Amanda Johnson  Clinical Pharmacist

## 2025-06-02 NOTE — PLAN OF CARE
Goal Outcome Evaluation:  Plan of Care Reviewed With: patient, spouse, family           Outcome Evaluation: PT with fever treated twice this shift. IV abx adjusted to cefepime and vancomycin.

## 2025-06-02 NOTE — PROGRESS NOTES
[facility]   Hospitalist Progress Note  Date: 2025  Patient Name: Oksana Smith  : 1960  MRN: [id number]  Date of admission: 2025  Consultants:   - Pulmonology: Dr. Richelle Vines    Subjective   Subjective     Chief Complaint: Fatigue, weakness    Summary:   Oksana Smith is a 64 y.o. female with low-grade follicular lymphoma ([facility]ed [facility] not currently on treatment), hypothyroidism, hypertension who presented to the ED with complaints of generalized weakness, fatigue and fever.  In ED patient was found to have a temperature of 104.7 °F.  CXR imaging showed left basilar opacity.  Hospitalist service called in for further evaluation management.  Empiric antibiotic started.  CT chest imaging obtained and dense consolidation in the right lower lobe noted.  Pulmonology consulted to assist in care.    Interval Followup:   Patient continues to have fevers.  States that she does not feel well.  Feels generally weak and easily fatigued.  No active chest pain.    Antibiotics:   - Cefepime  -Vancomycin    Objective   Objective     Vitals:   Temp:  [98.6 °F (37 °C)-104.7 °F (40.4 °C)] 99.7 °F (37.6 °C)  Heart Rate:  [] 109  Resp:  [16-18] 16  BP: ()/(56-77) 127/77  Flow (L/min) (Oxygen Therapy):  [2] 2  Physical Exam   Gen: Acutely ill-appearing female, sitting up in bed, pleasant  Resp: Diminished breath sounds, equal chest rise bilaterally  Card: Regular rhythm, tachycardic, No m/r/g  Abd: Soft, Nontender, Nondistended, + bowel sounds    Result Review    Result Review:  I have personally reviewed the results as below and agree with these findings:  []  Laboratory:   CMP          2025    12:18 2025    10:44 2025    05:53   CMP   Glucose 103  125  108    BUN 11.6  19.1  15.8    Creatinine 1.06  1.37  1.13    EGFR 59.5  43.7  55.1    Sodium 134  135  135    Potassium 3.5  3.9  3.7    Chloride 99  99  102    Calcium 8.5  8.2  8.1    Total Protein 6.8  6.6     Albumin  3.6  3.5     Globulin 3.2  3.1     Total Bilirubin 0.8  0.6     Alkaline Phosphatase 70  60     AST (SGOT) 11  11     ALT (SGPT) 9  7     Albumin/Globulin Ratio 1.1  1.1     BUN/Creatinine Ratio 10.9  13.9  14.0    Anion Gap 9.9  11.0  9.6      CBC          5/30/2025    11:11 6/1/2025    10:44 6/2/2025    05:53   CBC   WBC 14.46  14.40  12.82    RBC 5.03  4.52  4.44    Hemoglobin 15.5  13.8  13.6    Hematocrit 46.5  41.5  41.5    MCV 92.4  91.8  93.5    MCH 30.8  30.5  30.6    MCHC 33.3  33.3  32.8    RDW 13.3  13.2  13.2    Platelets 355  284  252    Procalcitonin: 0.29  [x]  Microbiology: Blood culture (06/01/2025): Pending.  []  Radiology:   []  EKG/Telemetry:    []  Cardiology/Vascular:    []  Pathology:  []  Old records:  []  Other:    Assessment & Plan   Assessment / Plan     Assessment:  Community-acquired pneumonia due to unknown infectious organism  Severe sepsis secondary to above, present on admission (fever, tachycardia, source of infection, leukocytosis)  Low-grade follicular lymphoma  Nausea, fatigue, weakness  Hypertension  Hypothyroidism    Plan:  -Given findings on CT imaging and since patient is immunocompromised pulmonology consulted to assist in care  -Start broad-spectrum antibiotics cefepime and vancomycin.  Monitor vancomycin level  -Strep pneumo and Legionella urinary antigens ordered.  -Mycoplasma antibody ordered  -Sputum culture ordered  -Trend procalcitonin  -Discussed care with pulmonology.  Will also check Fungitell, histoplasma and Aspergillus  -Acetaminophen available for fever  -Start appropriate home medications  -Will monitor electrolytes and renal function with BMP and magnesium level in the AM  -Will monitor WBC and Hgb with CBC in the AM  -Clinical course will dictate further management     DVT Prophylaxis: Lovenox  Diet:   Diet Order   Procedures    Diet: Regular/House; Fluid Consistency: Thin (IDDSI 0)     Dispo: Home when medically approved for discharge     Personally  reviewed patient's labs and imaging, discussed with patient and nurse at bedside. Discussed management with the following consultants: Pulmonology.     Part of this note may be an electronic transcription/translation of spoken language to printed text using the Dragon dictation system.    VTE Prophylaxis:  Mechanical VTE prophylaxis orders are present.      CODE STATUS:   Code Status (Patient has no pulse and is not breathing): CPR (Attempt to Resuscitate)  Medical Interventions (Patient has pulse or is breathing): Full Support  Level Of Support Discussed With: Patient      Electronically signed by Ryder Esparza MD, 6/2/2025, 10:38 EDT.

## 2025-06-03 LAB
ALBUMIN SERPL-MCNC: 2.7 G/DL (ref 3.5–5.2)
ANION GAP SERPL CALCULATED.3IONS-SCNC: 9 MMOL/L (ref 5–15)
BUN SERPL-MCNC: 12.2 MG/DL (ref 8–23)
BUN/CREAT SERPL: 12.3 (ref 7–25)
CALCIUM SPEC-SCNC: 7.9 MG/DL (ref 8.6–10.5)
CHLORIDE SERPL-SCNC: 103 MMOL/L (ref 98–107)
CO2 SERPL-SCNC: 23 MMOL/L (ref 22–29)
CREAT SERPL-MCNC: 0.99 MG/DL (ref 0.57–1)
EGFRCR SERPLBLD CKD-EPI 2021: 64.6 ML/MIN/1.73
GLUCOSE SERPL-MCNC: 105 MG/DL (ref 65–99)
PHOSPHATE SERPL-MCNC: 1.8 MG/DL (ref 2.5–4.5)
POTASSIUM SERPL-SCNC: 3.2 MMOL/L (ref 3.5–5.2)
SODIUM SERPL-SCNC: 135 MMOL/L (ref 136–145)
VANCOMYCIN SERPL-MCNC: 14.15 MCG/ML (ref 5–40)
WHOLE BLOOD HOLD SPECIMEN: NORMAL

## 2025-06-03 PROCEDURE — 86140 C-REACTIVE PROTEIN: CPT

## 2025-06-03 PROCEDURE — 25010000002 CEFEPIME PER 500 MG: Performed by: INTERNAL MEDICINE

## 2025-06-03 PROCEDURE — 25010000002 VANCOMYCIN 750 MG RECONSTITUTED SOLUTION 1 EACH VIAL: Performed by: INTERNAL MEDICINE

## 2025-06-03 PROCEDURE — 83880 ASSAY OF NATRIURETIC PEPTIDE: CPT

## 2025-06-03 PROCEDURE — 99233 SBSQ HOSP IP/OBS HIGH 50: CPT | Performed by: STUDENT IN AN ORGANIZED HEALTH CARE EDUCATION/TRAINING PROGRAM

## 2025-06-03 PROCEDURE — 25810000003 SODIUM CHLORIDE 0.9 % SOLUTION: Performed by: STUDENT IN AN ORGANIZED HEALTH CARE EDUCATION/TRAINING PROGRAM

## 2025-06-03 PROCEDURE — 25010000002 ENOXAPARIN PER 10 MG: Performed by: INTERNAL MEDICINE

## 2025-06-03 PROCEDURE — 80069 RENAL FUNCTION PANEL: CPT | Performed by: INTERNAL MEDICINE

## 2025-06-03 PROCEDURE — 25810000003 SODIUM CHLORIDE 0.9 % SOLUTION: Performed by: INTERNAL MEDICINE

## 2025-06-03 PROCEDURE — 25810000003 SODIUM CHLORIDE 0.9 % SOLUTION 250 ML FLEX CONT: Performed by: INTERNAL MEDICINE

## 2025-06-03 PROCEDURE — 99232 SBSQ HOSP IP/OBS MODERATE 35: CPT | Performed by: STUDENT IN AN ORGANIZED HEALTH CARE EDUCATION/TRAINING PROGRAM

## 2025-06-03 PROCEDURE — 80202 ASSAY OF VANCOMYCIN: CPT | Performed by: INTERNAL MEDICINE

## 2025-06-03 RX ORDER — POTASSIUM CHLORIDE 750 MG/1
40 CAPSULE, EXTENDED RELEASE ORAL ONCE
Status: COMPLETED | OUTPATIENT
Start: 2025-06-03 | End: 2025-06-03

## 2025-06-03 RX ORDER — FENTANYL/ROPIVACAINE/NS/PF 2-625MCG/1
15 PLASTIC BAG, INJECTION (ML) EPIDURAL ONCE
Status: COMPLETED | OUTPATIENT
Start: 2025-06-03 | End: 2025-06-03

## 2025-06-03 RX ADMIN — ENOXAPARIN SODIUM 40 MG: 40 INJECTION SUBCUTANEOUS at 09:24

## 2025-06-03 RX ADMIN — CEFEPIME 2000 MG: 2 INJECTION, POWDER, FOR SOLUTION INTRAVENOUS at 00:11

## 2025-06-03 RX ADMIN — CEFEPIME 2000 MG: 2 INJECTION, POWDER, FOR SOLUTION INTRAVENOUS at 16:47

## 2025-06-03 RX ADMIN — POTASSIUM CHLORIDE 40 MEQ: 750 CAPSULE, EXTENDED RELEASE ORAL at 09:23

## 2025-06-03 RX ADMIN — VANCOMYCIN HYDROCHLORIDE 750 MG: 750 INJECTION, POWDER, LYOPHILIZED, FOR SOLUTION INTRAVENOUS at 21:10

## 2025-06-03 RX ADMIN — POTASSIUM PHOSPHATE, MONOBASIC AND POTASSIUM PHOSPHATE, DIBASIC 15 MMOL: 224; 236 INJECTION, SOLUTION, CONCENTRATE INTRAVENOUS at 11:40

## 2025-06-03 RX ADMIN — LEVOTHYROXINE SODIUM 50 MCG: 0.05 TABLET ORAL at 05:45

## 2025-06-03 RX ADMIN — Medication 10 ML: at 21:10

## 2025-06-03 RX ADMIN — SODIUM CHLORIDE 100 ML/HR: 9 INJECTION, SOLUTION INTRAVENOUS at 01:18

## 2025-06-03 RX ADMIN — VANCOMYCIN HYDROCHLORIDE 750 MG: 750 INJECTION, POWDER, LYOPHILIZED, FOR SOLUTION INTRAVENOUS at 09:22

## 2025-06-03 RX ADMIN — CEFEPIME 2000 MG: 2 INJECTION, POWDER, FOR SOLUTION INTRAVENOUS at 09:22

## 2025-06-03 RX ADMIN — Medication 10 ML: at 09:22

## 2025-06-03 RX ADMIN — PRAVASTATIN SODIUM 10 MG: 20 TABLET ORAL at 09:23

## 2025-06-03 RX ADMIN — SODIUM CHLORIDE 100 ML/HR: 9 INJECTION, SOLUTION INTRAVENOUS at 11:42

## 2025-06-03 NOTE — PLAN OF CARE
Goal Outcome Evaluation:  Plan of Care Reviewed With: patient           Outcome Evaluation: IV abx continued this shift. Pt able to rest throughout shift. No complaints of pain. Up multiple times to bathroom over the night. VSS.

## 2025-06-03 NOTE — PROGRESS NOTES
[facility]   Hospitalist Progress Note  Date: 6/3/2025  Patient Name: Oksana Simth  : 1960  MRN: [id number]  Date of admission: 2025  Consultants:   - Pulmonology: Dr. Richelle Vines    Subjective   Subjective     Chief Complaint: Fatigue, weakness    Summary:   Oksana Smith is a 64 y.o. female with low-grade follicular lymphoma (followed at [facility], not currently on treatment), hypothyroidism, hypertension who presented to the ED with complaints of generalized weakness, fatigue and fever.  In ED patient was found to have a temperature of 104.7 °F.  CXR imaging showed left basilar opacity.  Hospitalist service called in for further evaluation management.  Empiric antibiotic started.  CT chest imaging obtained and dense consolidation in the right lower lobe noted.  Pulmonology consulted to assist in care.    Interval Followup:   Patient without fevers overnight.  Patient was asking for flutter valve.    Antibiotics:   - Cefepime  -Vancomycin    Objective   Objective     Vitals:   Temp:  [98.1 °F (36.7 °C)-102.7 °F (39.3 °C)] 98.1 °F (36.7 °C)  Heart Rate:  [] 94  Resp:  [16-18] 16  BP: (109-122)/(61-90) 122/63  Physical Exam   Gen: No acute distress  Resp: Normal work of breathing  Card: Normal S1, S2  Abd: Soft, Nontender, Nondistended    Result Review    Result Review:  I have personally reviewed the results as below and agree with these findings:  []  Laboratory:   CMP          2025    10:44 2025    05:53 6/3/2025    05:41   CMP   Glucose 125  108  105    BUN 19.1  15.8  12.2    Creatinine 1.37  1.13  0.99    EGFR 43.7  55.1  64.6    Sodium 135  135  135    Potassium 3.9  3.7  3.2    Chloride 99  102  103    Calcium 8.2  8.1  7.9    Total Protein 6.6      Albumin 3.5   2.7    Globulin 3.1      Total Bilirubin 0.6      Alkaline Phosphatase 60      AST (SGOT) 11      ALT (SGPT) 7      Albumin/Globulin Ratio 1.1      BUN/Creatinine Ratio 13.9  14.0  12.3    Anion Gap 11.0  9.6   9.0      CBC          5/30/2025    11:11 6/1/2025    10:44 6/2/2025    05:53   CBC   WBC 14.46  14.40  12.82    RBC 5.03  4.52  4.44    Hemoglobin 15.5  13.8  13.6    Hematocrit 46.5  41.5  41.5    MCV 92.4  91.8  93.5    MCH 30.8  30.5  30.6    MCHC 33.3  33.3  32.8    RDW 13.3  13.2  13.2    Platelets 355  284  252    Procalcitonin: 0.29  [x]  Microbiology: Blood culture (06/01/2025): Pending.  []  Radiology:   []  EKG/Telemetry:    []  Cardiology/Vascular:    []  Pathology:  []  Old records:  []  Other:    Assessment & Plan   Assessment / Plan     Assessment:  Community-acquired pneumonia due to unknown infectious organism  Severe sepsis secondary to above, present on admission (fever, tachycardia, source of infection, leukocytosis)  Low-grade follicular lymphoma  Nausea, fatigue, weakness  Hypertension  Hypothyroidism  Hypokalemia  Hyperphosphatemia    Plan:  -Given findings on CT imaging and since patient is immunocompromised pulmonology consulted to assist in care  - Continue antibiotics cefepime and vancomycin.  Monitor vancomycin level  -Strep pneumo and Legionella urinary antigens negative  -Mycoplasma antibody ordered  -Sputum culture ordered  -Discussed care with pulmonology.  Will also check Fungitell, histoplasma and Aspergillus  -Acetaminophen available for fever  -Start appropriate home medications  -Replete potassium  -Replete phosphorus  -Will monitor electrolytes and renal function with BMP and magnesium level in the AM  -Will monitor WBC and Hgb with CBC in the AM  -Clinical course will dictate further management     DVT Prophylaxis: Lovenox  Diet:   Diet Order   Procedures    Diet: Regular/House; Fluid Consistency: Thin (IDDSI 0)     Dispo: Home when medically approved for discharge         VTE Prophylaxis:  Pharmacologic & mechanical VTE prophylaxis orders are present.      CODE STATUS:   Code Status (Patient has no pulse and is not breathing): CPR (Attempt to Resuscitate)  Medical Interventions  (Patient has pulse or is breathing): Full Support  Level Of Support Discussed With: Patient

## 2025-06-03 NOTE — PROGRESS NOTES
[facility] Clinical Pharmacy Services: Vancomycin Monitoring Note    Oksana Smith is a 64 y.o. female who is on day 2/7 of pharmacy to dose vancomycin for Pneumonia.    Previous Vancomycin Dose:   750 mg IV every  12  hours  Imaging Reviewed?: Yes  CT Chest with contrast 6/1 @ 1915  IMPRESSION:  Dense pulmonary consolidation is present within the lower lobe of the right lung and is most suggestive of pneumonia. Consider close interval clinical and follow-up to ensure a benign progression and to exclude an underlying malignant process. Mild atelectasis and/or pneumonia in the  left lung base is (are) possible. Please see above comments for further detail.  Updated Cultures and Sensitivities:   Microbiology Results (last 10 days)       Procedure Component Value - Date/Time    MRSA Screen, PCR (Inpatient) - Swab, Nares [[id number]]  (Normal) Collected: 06/02/25 1245    Lab Status: Final result Specimen: Swab from Nares Updated: 06/02/25 1414     MRSA PCR No MRSA Detected    Narrative:      The negative predictive value of this diagnostic test is high and should only be used to consider de-escalating anti-MRSA therapy. A positive result may indicate colonization with MRSA and must be correlated clinically.    Blood Culture - Blood, Arm, Right [[id number]]  (Normal) Collected: 06/01/25 1420    Lab Status: Preliminary result Specimen: Blood from Arm, Right Updated: 06/02/25 1430     Blood Culture No growth at 24 hours    Blood Culture - Blood, Arm, Left [[id number]]  (Normal) Collected: 06/01/25 1420    Lab Status: Preliminary result Specimen: Blood from Arm, Left Updated: 06/02/25 1430     Blood Culture No growth at 24 hours    S. Pneumo Ag Urine or CSF - Urine, Urine, Clean Catch [[id number]]  (Normal) Collected: 06/01/25 1153    Lab Status: Final result Specimen: Urine, Clean Catch Updated: 06/02/25 0836     Strep Pneumo Ag Negative    Legionella Antigen, Urine - Urine, Urine, Clean Catch [[id number]]  (Normal)  Collected: 06/01/25 1153    Lab Status: Final result Specimen: Urine, Clean Catch Updated: 06/02/25 0836     LEGIONELLA ANTIGEN, URINE Negative    Mycoplasma Pneumoniae Antibody, IgM - Blood, [[id number]]  (Normal) Collected: 06/01/25 1044    Lab Status: Final result Specimen: Blood Updated: 06/02/25 0834     Mycoplasma pneumo IgM Negative    COVID PRE-OP / PRE-PROCEDURE SCREENING ORDER (NO ISOLATION) - Swab, Nasopharynx [[id number]]  (Normal) Collected: 06/01/25 1043    Lab Status: Final result Specimen: Swab from Nasopharynx Updated: 06/01/25 1209    Narrative:      The following orders were created for panel order COVID PRE-OP / PRE-PROCEDURE SCREENING ORDER (NO ISOLATION) - Swab, Nasopharynx.  Procedure                               Abnormality         Status                     ---------                               -----------         ------                     COVID-19, FLU A/B, RSV P...[[id number]]  Normal              Final result                 Please view results for these tests on the individual orders.    COVID-19, FLU A/B, RSV PCR 1 HR TAT - Swab, Nasopharynx [[id number]]  (Normal) Collected: 06/01/25 1043    Lab Status: Final result Specimen: Swab from Nasopharynx Updated: 06/01/25 1209     COVID19 Not Detected     Influenza A PCR Not Detected     Influenza B PCR Not Detected     RSV, PCR Not Detected    Narrative:      Fact sheet for providers: https://www.fda.gov/media/248803/download    Fact sheet for patients: https://www.fda.gov/media/792044/download    Test performed by PCR.    COVID-19, FLU A/B, RSV PCR 1 HR TAT - Swab, Nasopharynx [[id number]]  (Normal) Collected: 05/30/25 1154    Lab Status: Final result Specimen: Swab from Nasopharynx Updated: 05/30/25 1243     COVID19 Not Detected     Influenza A PCR Not Detected     Influenza B PCR Not Detected     RSV, PCR Not Detected    Narrative:      Fact sheet for providers: https://www.fda.gov/media/065237/download    Fact sheet for patients:  "https://www.fda.gov/media/581601/download    Test performed by PCR.              Vitals/Labs  Ht: 165.1 cm (65\"); Wt: 83.4 kg (183 lb 13.8 oz)   Temp (24hrs), Av.4 °F (37.4 °C), Min:98.4 °F (36.9 °C), Max:102.7 °F (39.3 °C)   Estimated Creatinine Clearance: 62.9 mL/min (by C-G formula based on SCr of 0.99 mg/dL).     Results from last 7 days   Lab Units 25  0541 25  0553 25  1044 25  1218 25  1111   VANCOMYCIN RM mcg/mL 14.15  --   --   --   --    CREATININE mg/dL 0.99 1.13* 1.37*   < >  --    WBC 10*3/mm3  --  12.82* 14.40*  --  14.46*    < > = values in this interval not displayed.     Assessment/Plan    Current Vancomycin Dose: 750 mg IV every 12 hours; which provides the following predicted parameters:  Exposure target: AUC24 (range) 400-600 mg/L.hr   AUC24,ss: 472 mg/L.hr  Probability of AUC24 > 400: 79 %  Ctrough,ss: 15.7 mg/L  Probability of Ctrough,ss > 20: 19 %  Probability of nephrotoxicity (Lodise ROSELIA ): 11 %  Next Vanc Trough ordered for  at 0730  We will continue to monitor patient changes and renal function     Thank you for involving pharmacy in this patient's care. Please contact pharmacy with any questions or concerns.    Amanda Johnson  Clinical Pharmacist    "

## 2025-06-03 NOTE — PROGRESS NOTES
Pulmonary / Critical Care Progress Note      Patient Name: Oksana Smith  : 1960  MRN: [id number]  Attending:  Blayne Kong MD  Date of admission: 2025    Subjective   Subjective   Follow-up for multifocal pneumonia    Over past 24 hours:  Doing okay this morning  Sitting up in chair  On room air  Has cough but unable to get sputum up  Micro has been negative  No fever, chills    Review of Systems  General: Denied complaints  Cardiovascular:  Denied complaints  Respiratory: Denied complaints  Gastrointestinal: Denied complaints        Objective   Objective     Vitals:   Temp:  [98.1 °F (36.7 °C)-102.7 °F (39.3 °C)] 98.2 °F (36.8 °C)  Heart Rate:  [] 95  Resp:  [16-18] 18  BP: (109-122)/(61-90) 119/62    Physical Exam   Vital Signs Reviewed   General:  WDWN, Alert, NAD.    HEENT:  PERRL, EOMI.  OP, nares clear  Chest:  good aeration, clear to auscultation bilaterally, no work of breathing noted  CV: RRR, no MGR, pulses 2+, equal.  Abd:  Soft, NT, ND, + BS  EXT:  no clubbing, no cyanosis, no edema  Neuro:  A&Ox3, CN grossly intact, no focal deficits.  Skin: No rashes or lesions noted      Result Review    Result Review:  I have personally reviewed the results from the time of this admission to 6/3/2025 15:06 EDT and agree with these findings:  []  Laboratory  []  Microbiology  []  Radiology  []  EKG/Telemetry   []  Cardiology/Vascular   []  Pathology  []  Old records  []  Other:  Most notable findings include:   -     Assessment & Plan   Assessment / Plan     Active Hospital Problems:  Active Hospital Problems    Diagnosis     **Weakness          Impression:  Multifocal pneumonia, unspecified organism  Elevated procalcitonin  Questionable acute kidney injury  Leukocytosis  Abnormal chest CT  History of lymphoma  Therapeutic drug monitoring of vancomycin  Hypokalemia  Hypophosphatemia     Plan:  Can use supplemental oxygen as needed to keep SpO2 greater than 90%  CT of chest personally reviewed.   Bilateral right greater than left dense consolidations of lower lobes consistent with multifocal pneumonia  MRSA nares negative.  Will stop vancomycin.  Will continue cefepime for another day.  Can likely transition to Unasyn tomorrow.  Micro has been negative thus far.  Lactic acid 1.1.  Procalcitonin 0.27.  Blood cultures no growth to date  Fungal study ordered  Continue with nebs and bronchopulmonary hygiene  Encourage I-S and flutter valve use  Will consider bronchoscopy if patient does not improve in the next few days    VTE Prophylaxis:  Pharmacologic & mechanical VTE prophylaxis orders are present.        CODE STATUS:   Code Status (Patient has no pulse and is not breathing): CPR (Attempt to Resuscitate)  Medical Interventions (Patient has pulse or is breathing): Full Support  Level Of Support Discussed With: Patient      Labs, images, and medications personally reviewed.    Discussed with patient    Electronically signed by Richelle Vines MD, 06/03/25, 3:06 PM EDT.

## 2025-06-03 NOTE — PLAN OF CARE
Goal Outcome Evaluation:  Plan of Care Reviewed With: patient        Progress: no change  Outcome Evaluation: IVF and IV abx continued. Pt has been afebrile. Stand by to bathroom. Family at bedside. Potassium and Phosphorus replaced.

## 2025-06-04 LAB
1,3 BETA GLUCAN SER QL: NEGATIVE
1,3 BETA GLUCAN SER-MCNC: <31.25 PG/ML
ANION GAP SERPL CALCULATED.3IONS-SCNC: 8.8 MMOL/L (ref 5–15)
BUN SERPL-MCNC: 9.6 MG/DL (ref 8–23)
BUN/CREAT SERPL: 11.6 (ref 7–25)
CALCIUM SPEC-SCNC: 8.2 MG/DL (ref 8.6–10.5)
CHLORIDE SERPL-SCNC: 104 MMOL/L (ref 98–107)
CITATION REF LAB TEST: NORMAL
CO2 SERPL-SCNC: 23.2 MMOL/L (ref 22–29)
CREAT SERPL-MCNC: 0.83 MG/DL (ref 0.57–1)
CRP SERPL-MCNC: 20.81 MG/DL (ref 0–0.5)
DEPRECATED RDW RBC AUTO: 45.5 FL (ref 37–54)
EGFRCR SERPLBLD CKD-EPI 2021: 79.8 ML/MIN/1.73
ERYTHROCYTE [DISTWIDTH] IN BLOOD BY AUTOMATED COUNT: 13.4 % (ref 12.3–15.4)
GLUCOSE SERPL-MCNC: 88 MG/DL (ref 65–99)
HCT VFR BLD AUTO: 35.6 % (ref 34–46.6)
HGB BLD-MCNC: 11.7 G/DL (ref 12–15.9)
IMP & REVIEW OF LAB RESULTS: NORMAL
LAB DIRECTOR NAME PROVIDER: NORMAL
LABORATORY COMMENT REPORT: NORMAL
MCH RBC QN AUTO: 30.5 PG (ref 26.6–33)
MCHC RBC AUTO-ENTMCNC: 32.9 G/DL (ref 31.5–35.7)
MCV RBC AUTO: 93 FL (ref 79–97)
NT-PROBNP SERPL-MCNC: 258.4 PG/ML (ref 0–900)
PHOSPHATE SERPL-MCNC: 1.9 MG/DL (ref 2.5–4.5)
PLATELET # BLD AUTO: 183 10*3/MM3 (ref 140–450)
PMV BLD AUTO: 9.8 FL (ref 6–12)
POTASSIUM SERPL-SCNC: 3.6 MMOL/L (ref 3.5–5.2)
RBC # BLD AUTO: 3.83 10*6/MM3 (ref 3.77–5.28)
REF LAB TEST METHOD: NORMAL
SODIUM SERPL-SCNC: 136 MMOL/L (ref 136–145)
WBC NRBC COR # BLD AUTO: 8.3 10*3/MM3 (ref 3.4–10.8)

## 2025-06-04 PROCEDURE — 99232 SBSQ HOSP IP/OBS MODERATE 35: CPT | Performed by: STUDENT IN AN ORGANIZED HEALTH CARE EDUCATION/TRAINING PROGRAM

## 2025-06-04 PROCEDURE — 97166 OT EVAL MOD COMPLEX 45 MIN: CPT

## 2025-06-04 PROCEDURE — 80048 BASIC METABOLIC PNL TOTAL CA: CPT | Performed by: STUDENT IN AN ORGANIZED HEALTH CARE EDUCATION/TRAINING PROGRAM

## 2025-06-04 PROCEDURE — 25810000003 SODIUM CHLORIDE 0.9 % SOLUTION: Performed by: STUDENT IN AN ORGANIZED HEALTH CARE EDUCATION/TRAINING PROGRAM

## 2025-06-04 PROCEDURE — 94799 UNLISTED PULMONARY SVC/PX: CPT

## 2025-06-04 PROCEDURE — 84100 ASSAY OF PHOSPHORUS: CPT | Performed by: STUDENT IN AN ORGANIZED HEALTH CARE EDUCATION/TRAINING PROGRAM

## 2025-06-04 PROCEDURE — 85027 COMPLETE CBC AUTOMATED: CPT | Performed by: STUDENT IN AN ORGANIZED HEALTH CARE EDUCATION/TRAINING PROGRAM

## 2025-06-04 PROCEDURE — 97161 PT EVAL LOW COMPLEX 20 MIN: CPT

## 2025-06-04 PROCEDURE — 25010000002 CEFEPIME PER 500 MG: Performed by: INTERNAL MEDICINE

## 2025-06-04 PROCEDURE — 25010000002 ENOXAPARIN PER 10 MG: Performed by: INTERNAL MEDICINE

## 2025-06-04 RX ORDER — FENTANYL/ROPIVACAINE/NS/PF 2-625MCG/1
15 PLASTIC BAG, INJECTION (ML) EPIDURAL
Status: COMPLETED | OUTPATIENT
Start: 2025-06-04 | End: 2025-06-04

## 2025-06-04 RX ADMIN — POTASSIUM PHOSPHATE, MONOBASIC AND POTASSIUM PHOSPHATE, DIBASIC 15 MMOL: 224; 236 INJECTION, SOLUTION, CONCENTRATE INTRAVENOUS at 12:37

## 2025-06-04 RX ADMIN — CEFEPIME 2000 MG: 2 INJECTION, POWDER, FOR SOLUTION INTRAVENOUS at 03:43

## 2025-06-04 RX ADMIN — CEFEPIME 2000 MG: 2 INJECTION, POWDER, FOR SOLUTION INTRAVENOUS at 21:05

## 2025-06-04 RX ADMIN — Medication 10 ML: at 08:28

## 2025-06-04 RX ADMIN — Medication 10 ML: at 21:06

## 2025-06-04 RX ADMIN — LEVOTHYROXINE SODIUM 50 MCG: 0.05 TABLET ORAL at 05:43

## 2025-06-04 RX ADMIN — CEFEPIME 2000 MG: 2 INJECTION, POWDER, FOR SOLUTION INTRAVENOUS at 08:28

## 2025-06-04 RX ADMIN — POTASSIUM PHOSPHATE, MONOBASIC AND POTASSIUM PHOSPHATE, DIBASIC 15 MMOL: 224; 236 INJECTION, SOLUTION, CONCENTRATE INTRAVENOUS at 16:01

## 2025-06-04 RX ADMIN — ENOXAPARIN SODIUM 40 MG: 40 INJECTION SUBCUTANEOUS at 08:28

## 2025-06-04 NOTE — PROGRESS NOTES
Pulmonary / Critical Care Progress Note      Patient Name: Oksana Smith  : 1960  MRN: [id number]  Attending:  Blayne Kong MD  Date of admission: 2025    Subjective   Subjective   Follow-up for multifocal pneumonia    Over past 24 hours:  Doing well this morning  Sitting up in chair  On room air  Still has a cough but mostly nonproductive  No fever, chills    Review of Systems  General: Denied complaints  Cardiovascular:  Denied complaints  Respiratory: Denied complaints  Gastrointestinal: Denied complaints        Objective   Objective     Vitals:   Temp:  [98.1 °F (36.7 °C)-98.4 °F (36.9 °C)] 98.2 °F (36.8 °C)  Heart Rate:  [] 80  Resp:  [16-18] 16  BP: (111-143)/(58-75) 123/68    Physical Exam   Vital Signs Reviewed   General:  WDWN, Alert, NAD.    HEENT:  PERRL, EOMI.  OP, nares clear  Chest:  good aeration, clear to auscultation bilaterally, no work of breathing noted  CV: RRR, no MGR, pulses 2+, equal.  Abd:  Soft, NT, ND, + BS  EXT:  no clubbing, no cyanosis, no edema  Neuro:  A&Ox3, CN grossly intact, no focal deficits.  Skin: No rashes or lesions noted      Result Review    Result Review:  I have personally reviewed the results from the time of this admission to 2025 13:12 EDT and agree with these findings:  []  Laboratory  []  Microbiology  []  Radiology  []  EKG/Telemetry   []  Cardiology/Vascular   []  Pathology  []  Old records  []  Other:  Most notable findings include:   -     Assessment & Plan   Assessment / Plan     Active Hospital Problems:  Active Hospital Problems    Diagnosis     **Weakness          Impression:  Multifocal pneumonia, unspecified organism  Elevated procalcitonin  Questionable acute kidney injury  Leukocytosis  Abnormal chest CT  History of lymphoma  Therapeutic drug monitoring of vancomycin  Hypokalemia  Hypophosphatemia     Plan:  Can use supplemental oxygen as needed to keep SpO2 greater than 90%  CT of chest personally reviewed.  Bilateral right  greater than left dense consolidations of lower lobes consistent with multifocal pneumonia  MRSA nares negative.  Will stop vancomycin.  Will continue cefepime for another day.  Can likely transition to Unasyn.  Micro has been negative thus far.  Lactic acid 1.1.  Procalcitonin 0.27.  Blood cultures no growth to date  Fungal study ordered  Continue with nebs and bronchopulmonary hygiene  Encourage I-S and flutter valve use  Replace electrolytes to keep K4, mag 2, Phos 4.  Will consider bronchoscopy if patient does not improve in the next few days    VTE Prophylaxis:  Pharmacologic & mechanical VTE prophylaxis orders are present.        CODE STATUS:   Code Status (Patient has no pulse and is not breathing): CPR (Attempt to Resuscitate)  Medical Interventions (Patient has pulse or is breathing): Full Support  Level Of Support Discussed With: Patient      Labs, images, and medications personally reviewed.    Discussed with patient  Electronically signed by Richelle Vines MD, 06/04/25, 1:17 PM EDT.

## 2025-06-04 NOTE — PLAN OF CARE
Goal Outcome Evaluation:              Outcome Evaluation: A&Ox4. VSS on room air.  Pt up to bathroom adlib.  IV fluids D/Kermit. IV ATB continue with no adverse reactions noted.  No complaints of pain voiced.  No acute distress noted. Care plan continues. Call bell within reach at all times for needs and safety.

## 2025-06-04 NOTE — PROGRESS NOTES
[facility]   Hospitalist Progress Note  Date: 2025  Patient Name: Oksana Smith  : 1960  MRN: [id number]  Date of admission: 2025  Consultants:   - Pulmonology: Dr. Richelle Vines    Subjective   Subjective     Chief Complaint: Fatigue, weakness    Summary:   Oksana Smith is a 64 y.o. female with low-grade follicular lymphoma ([facility] not currently on treatment), hypothyroidism, hypertension who presented to the ED with complaints of generalized weakness, fatigue and fever.  In ED patient was found to have a temperature of 104.7 °F.  CXR imaging showed left basilar opacity.  Hospitalist service called in for further evaluation management.  Empiric antibiotic started.  CT chest imaging obtained and dense consolidation in the right lower lobe noted.  Pulmonology consulted to assist in care.    Interval Followup:   No longer having fevers.  Leukocytosis improved.  Hospital course complicated by hypophosphatemia.  Repleted phosphorus.      Antibiotics:   - Cefepime      Objective   Objective     Vitals:   Temp:  [98.1 °F (36.7 °C)-98.4 °F (36.9 °C)] 98.1 °F (36.7 °C)  Heart Rate:  [] 94  Resp:  [16-18] 16  BP: (109-143)/(58-75) 109/67  Physical Exam   Gen: No acute distress  Resp: No accessory muscle use  Card: Regular rate and rhythm    Result Review    Result Review:  I have personally reviewed the results as below and agree with these findings:  []  Laboratory:   CMP          2025    05:53 6/3/2025    05:41 2025    07:35   CMP   Glucose 108  105  88    BUN 15.8  12.2  9.6    Creatinine 1.13  0.99  0.83    EGFR 55.1  64.6  79.8    Sodium 135  135  136    Potassium 3.7  3.2  3.6    Chloride 102  103  104    Calcium 8.1  7.9  8.2    Albumin  2.7     BUN/Creatinine Ratio 14.0  12.3  11.6    Anion Gap 9.6  9.0  8.8      CBC          2025    10:44 2025    05:53 2025    07:35   CBC   WBC 14.40  12.82  8.30    RBC 4.52  4.44  3.83    Hemoglobin 13.8  13.6   11.7    Hematocrit 41.5  41.5  35.6    MCV 91.8  93.5  93.0    MCH 30.5  30.6  30.5    MCHC 33.3  32.8  32.9    RDW 13.2  13.2  13.4    Platelets 284  252  183    Procalcitonin: 0.29  [x]  Microbiology: Blood culture (06/01/2025): Pending.  []  Radiology:   []  EKG/Telemetry:    []  Cardiology/Vascular:    []  Pathology:  []  Old records:  []  Other:    Assessment & Plan   Assessment / Plan     Assessment:  Community-acquired pneumonia due to unknown infectious organism  Low-grade follicular lymphoma  Hypertension  Hypothyroidism  Hypophosphatemia    Plan:  -Pulmonology consulted  - Continue antibiotics cefepime  -Strep pneumo and Legionella urinary antigens negative  -Blood cultures negative  -MRSA nares negative  -Replete phosphorus  -Will monitor electrolytes and renal function with BMP and magnesium level in the AM  -Will monitor WBC and Hgb with CBC in the AM  -Clinical course will dictate further management     DVT Prophylaxis: Lovenox  Diet:   Diet Order   Procedures    Diet: Regular/House; Fluid Consistency: Thin (IDDSI 0)     Dispo: Home when medically approved for discharge         VTE Prophylaxis:  Pharmacologic & mechanical VTE prophylaxis orders are present.      CODE STATUS:   Code Status (Patient has no pulse and is not breathing): CPR (Attempt to Resuscitate)  Medical Interventions (Patient has pulse or is breathing): Full Support  Level Of Support Discussed With: Patient

## 2025-06-04 NOTE — PLAN OF CARE
Goal Outcome Evaluation:  Plan of Care Reviewed With: patient        Progress: no change  Outcome Evaluation: Vancomycin discontinued. Potassium Phosphates IV x2 for Phos 1.9. Pt encouraged to increase oral intake. IV abx continued. US guided IV placed. VSS.

## 2025-06-04 NOTE — THERAPY EVALUATION
Acute Care - Physical Therapy Initial Evaluation  [facility]     Patient Name: Oksana mSith  : 1960  MRN: [id number]  Today's Date: 2025      Visit Dx:     ICD-10-CM ICD-9-CM   1. Pneumonia of left lower lobe due to infectious organism  J18.9 486   2. Sepsis with acute hypoxic respiratory failure without septic shock, due to unspecified organism  A41.9 038.9    R65.20 995.91    J96.01 518.81   3. Difficulty in walking  R26.2 719.7     Patient Active Problem List   Diagnosis    Atypical ductal hyperplasia of breast    Thymoma    Chronic cough    Constipation    Disorder of diaphragm    Early satiety    Edema of lower extremity    Esophageal dysphagia    Fatigue    HTN (hypertension)    Hypothyroidism    Right knee pain    Rupture of patellar tendon    Vitamin B12 deficiency    Vitamin D deficiency    Acute otitis media    Cough    COVID-19 virus infection    S/P lumpectomy, right breast    Shortness of breath    Rectal bleeding    Malignant neoplasm of thymus    Low vitamin D level    Low serum vitamin B12    Lateral epicondylitis    History of tonsillectomy    Edema of lower extremity    Bilateral chronic serous otitis media    History of colon polyps    Bodies, loose, joint, knee, left    Primary osteoarthritis of left knee    Impaired fasting glucose    Follicular lymphoma of intra-abdominal lymph nodes    Hyperlipidemia    Weakness     Past Medical History:   Diagnosis Date    Atypical ductal hyperplasia of right breast     Breast lump     Cancer     Chronic cough     Colon polyp     Constipation     Early satiety     Esophageal dysphagia     Fatigue     Hemidiaphragm paralysis     HTN (hypertension)     Hypothyroidism     Lateral epicondylitis of left elbow 2018    Lymphoma     OM (otitis media)     PONV (postoperative nausea and vomiting)     Rectal bleeding     Right knee pain 2018    Shortness of breath     Tear of PCL (posterior cruciate ligament) of knee, right, initial  encounter 08/30/2018    Thymoma, malignant     Thyroid disorder     Vitamin B12 deficiency     Vitamin D deficiency      Past Surgical History:   Procedure Laterality Date    BREAST LUMPECTOMY Right     COLONOSCOPY      Dr. Roque    COLONOSCOPY N/A 05/10/2024    Procedure: COLONOSCOPY WITH POLYPECTOMY;  Surgeon: Ayden Roque MD;  Location: [facility] ENDOSCOPY;  Service: Gastroenterology;  Laterality: N/A;  COLON POLYP    ENDOSCOPY  09/11/2018    Dr. Roque    HYSTERECTOMY  1994    STOMACH SURGERY      biopsy    TONSILLECTOMY  1967     PT Assessment (Last 12 Hours)       PT Evaluation and Treatment       Row Name 06/04/25 0900          Physical Therapy Time and Intention    Subjective Information complains of;weakness;fatigue  -BOBY     Document Type evaluation  -BOBY     Mode of Treatment individual therapy;physical therapy  -BOBY     Patient Effort good  -BOBY       Row Name 06/04/25 0900          General Information    Patient Profile Reviewed yes  -BOBY     Patient Observations alert;cooperative;agree to therapy  -BOBY     Prior Level of Function independent:  -BOBY     Equipment Currently Used at Home none  -BOBY     Risks Reviewed patient:  -BOBY     Benefits Reviewed patient:  -BOBY     Barriers to Rehab none identified  -BOBY       Row Name 06/04/25 0900          Living Environment    Current Living Arrangements home  -BOBY     Home Accessibility stairs to enter home  -BOBY     People in Home spouse  -BOBY     Primary Care Provided by self  -BOBY       Row Name 06/04/25 0900          Home Main Entrance    Number of Stairs, Main Entrance three  -BOBY       Row Name 06/04/25 0900          Home Use of Assistive/Adaptive Equipment    Equipment Currently Used at Home none  -BOBY       Row Name 06/04/25 0900          Range of Motion (ROM)    Range of Motion ROM is WNL  -BOBY       Row Name 06/04/25 0900          Strength (Manual Muscle Testing)    Strength (Manual Muscle Testing) strength is WNL  5/5 BLE  -BOBY       Row Name 06/04/25 0900           Transfers    Transfers sit-stand transfer;stand-sit transfer  -BOBY       Row Name 06/04/25 0900          Sit-Stand Transfer    Sit-Stand [place] (Transfers) independent  -BOBY       Row Name 06/04/25 0900          Stand-Sit Transfer    Stand-Sit [place] (Transfers) independent  -BOBY       Row Name 06/04/25 0900          Gait/Stairs (Locomotion)    Gait/Stairs Locomotion gait/ambulation independence  -BOBY     Midway Level (Gait) supervision  -BOBY     Assistive Device (Gait) walker, front-wheeled  Had on standby just in case it was necessary  -BOBY     Patient was able to Ambulate yes  -BOBY     Distance in Feet (Gait) 350  -BOBY     Pattern (Gait) step-through  -BOBY       Row Name 06/04/25 0900          Balance    Balance Assessment standing dynamic balance  -BOBY     Dynamic Standing Balance supervision  -BOBY     Position/Device Used, Standing Balance walker, front-wheeled  Had on standby just incase it was necessary  -BOBY       Row Name 06/04/25 0900          Plan of Care Review    Plan of Care Reviewed With patient  -BOBY     Outcome Evaluation Pt did not present with significant deficits and does not require skilled PT. PT recommend discharge to home.  -BOBY       Row Name 06/04/25 0900          Therapy Assessment/Plan (PT)    Patient/Family Therapy Goals Statement (PT) Walk pain free around home and community.  -BOBY     Criteria for Skilled Interventions Met (PT) no problems identified which require skilled intervention  -BOBY     Therapy Frequency (PT) evaluation only  -BOBY       Row Name 06/04/25 0900          PT Evaluation Complexity    History, PT Evaluation Complexity no personal factors and/or comorbidities  -BOBY     Examination of Body Systems (PT Eval Complexity) total of 4 or more elements  -BOBY     Clinical Presentation (PT Evaluation Complexity) stable  -BOBY     Clinical Decision Making (PT Evaluation Complexity) low complexity  -BOBY     Overall Complexity (PT Evaluation Complexity) low complexity  -BOBY        Row Name 06/04/25 0900          Therapy Plan Review/Discharge Plan (PT)    Therapy Plan Review (PT) evaluation/treatment results reviewed  -BOBY       Row Name 06/04/25 0900          Discharge Summary (PT)    Additional Documentation Discharge Summary (PT) (Group)  -BOBY       Row Name 06/04/25 0900          Discharge Summary (PT)    Reason for Discharge (PT Discharge Summary) no further needs identified  -BOBY     Outcomes Achieved Upon Discharge (PT Discharge Summary) evaluation only  -BOBY     Transfer to Another Level of Care or Facility (PT Discharge Summary) home  -BOBY     Discharge Summary Statement (PT) Pt does not present with significant deficits. PT recommend discharge to home.  -BOBY               User Key  (r) = Recorded By, (t) = Taken By, (c) = Cosigned By      Initials Name Provider Type    Sam Ferrara PT Physical Therapist                    Physical Therapy Education       Title: PT OT SLP Therapies (Done)       Topic: Physical Therapy (Done)       Point: Mobility training (Done)       Learning Progress Summary            Patient Acceptance, E,TB, VU by BOBY at 6/4/2025 1010                                      User Key       Initials Effective Dates Name Provider Type Discipline    BOBY 06/03/21 -  Sam Kirby PT Physical Therapist PT                  PT Recommendation and Plan  Anticipated Discharge Disposition (PT): home  Therapy Frequency (PT): evaluation only  Plan of Care Reviewed With: patient  Outcome Evaluation: Pt did not present with significant deficits and does not require skilled PT. PT recommend discharge to home.   Outcome Measures       Row Name 06/04/25 0900             How much help from another person do you currently need...    Turning from your back to your side while in flat bed without using bedrails? 4  -BOBY      Moving from lying on back to sitting on the side of a flat bed without bedrails? 4  -BOBY      Moving to and from a bed to a chair (including a wheelchair)? 4  -BOBY       Standing up from a chair using your arms (e.g., wheelchair, bedside chair)? 4  -BOBY      Climbing 3-5 steps with a railing? 3  -BOBY      To walk in hospital room? 4  -BOBY      AM-PAC 6 Clicks Score (PT) 23  -BOBY         Functional Assessment    Outcome Measure Options AM-PAC 6 Clicks Basic Mobility (PT)  -BOBY                User Key  (r) = Recorded By, (t) = Taken By, (c) = Cosigned By      Initials Name Provider Type    Sam Ferrara, PT Physical Therapist                     Time Calculation:    PT Charges       Row Name 06/04/25 0959             Time Calculation    PT Received On 06/04/25  -BOBY      PT Goal Re-Cert Due Date 06/13/25  -BOBY         Untimed Charges    PT Eval/Re-eval Minutes 10  -BOBY         Total Minutes    Untimed Charges Total Minutes 10  -BOBY       Total Minutes 10  -BOBY                User Key  (r) = Recorded By, (t) = Taken By, (c) = Cosigned By      Initials Name Provider Type    Sam Ferrraa, PT Physical Therapist                      PT G-Codes  Outcome Measure Options: AM-PAC 6 Clicks Basic Mobility (PT)  AM-PAC 6 Clicks Score (PT): 23    Sam Kirby, PT  6/4/2025

## 2025-06-04 NOTE — THERAPY EVALUATION
Patient Name: Oksana Smith  : 1960    MRN: [id number]                              Today's Date: 2025       Admit Date: 2025    Visit Dx:     ICD-10-CM ICD-9-CM   1. Pneumonia of left lower lobe due to infectious organism  J18.9 486   2. Sepsis with acute hypoxic respiratory failure without septic shock, due to unspecified organism  A41.9 038.9    R65.20 995.91    J96.01 518.81   3. Difficulty in walking  R26.2 719.7     Patient Active Problem List   Diagnosis    Atypical ductal hyperplasia of breast    Thymoma    Chronic cough    Constipation    Disorder of diaphragm    Early satiety    Edema of lower extremity    Esophageal dysphagia    Fatigue    HTN (hypertension)    Hypothyroidism    Right knee pain    Rupture of patellar tendon    Vitamin B12 deficiency    Vitamin D deficiency    Acute otitis media    Cough    COVID-19 virus infection    S/P lumpectomy, right breast    Shortness of breath    Rectal bleeding    Malignant neoplasm of thymus    Low vitamin D level    Low serum vitamin B12    Lateral epicondylitis    History of tonsillectomy    Edema of lower extremity    Bilateral chronic serous otitis media    History of colon polyps    Bodies, loose, joint, knee, left    Primary osteoarthritis of left knee    Impaired fasting glucose    Follicular lymphoma of intra-abdominal lymph nodes    Hyperlipidemia    Weakness     Past Medical History:   Diagnosis Date    Atypical ductal hyperplasia of right breast     Breast lump     Cancer     Chronic cough     Colon polyp     Constipation     Early satiety     Esophageal dysphagia     Fatigue     Hemidiaphragm paralysis     HTN (hypertension)     Hypothyroidism     Lateral epicondylitis of left elbow 2018    Lymphoma     OM (otitis media)     PONV (postoperative nausea and vomiting)     Rectal bleeding     Right knee pain 2018    Shortness of breath     Tear of PCL (posterior cruciate ligament) of knee, right, initial encounter  08/30/2018    Thymoma, malignant     Thyroid disorder     Vitamin B12 deficiency     Vitamin D deficiency      Past Surgical History:   Procedure Laterality Date    BREAST LUMPECTOMY Right     COLONOSCOPY      Dr. Roque    COLONOSCOPY N/A 05/10/2024    Procedure: COLONOSCOPY WITH POLYPECTOMY;  Surgeon: Ayden Roque MD;  Location: [facility] ENDOSCOPY;  Service: Gastroenterology;  Laterality: N/A;  COLON POLYP    ENDOSCOPY  09/11/2018    Dr. Roque    HYSTERECTOMY  1994    STOMACH SURGERY      biopsy    TONSILLECTOMY  1967      General Information       Row Name 06/04/25 1105          OT Time and Intention    Document Type evaluation  -SC     Mode of Treatment individual therapy;occupational therapy  -SC     Patient Effort good  -SC       Row Name 06/04/25 1105          General Information    Patient Profile Reviewed yes  -SC     Prior Level of Function independent:  Pt lives with her spouse and is retired. PLOF is independence in all ADLs/IAdLs. She does not require a device for mobility or DME in the home.  -SC     Existing Precautions/Restrictions no known precautions/restrictions  -SC     Barriers to Rehab none identified  -SC       Row Name 06/04/25 1105          Occupational Profile    Reason for Services/Referral (Occupational Profile) Patient is a 64 year-old female admitted to [facility] on 6/1/2025.  OT consulted due to a potential decline in function and mobility.  No previous OT services for current condition.  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  -SC     Patient Goals (Occupational Profile) Patient wishes to return home to[place]  -SC       Row Name 06/04/25 1105          Living Environment    Current Living Arrangements home  -SC     People in Home spouse  -SC       Row Name 06/04/25 1105          Cognition    Orientation Status (Cognition) oriented x 4  -SC       Row Name 06/04/25 1105          Safety Issues/Impairments Affecting  Functional Mobility    Comment, Safety Issues/Impairments (Mobility) no safety issues noted  -SC               User Key  (r) = Recorded By, (t) = Taken By, (c) = Cosigned By      Initials Name Provider Type    Kaylee Eddy OT Occupational Therapist                     Mobility/ADL's       Row Name 06/04/25 1108          Bed Mobility    Bed Mobility bed mobility (all) activities  -SC     All Activities, [place] (Bed Mobility) independent  -[facility] Name 06/04/25 1108          Transfers    Transfers sit-stand transfer;stand-sit transfer  -[facility] Name 06/04/25 1108          Sit-Stand Transfer    Sit-Stand [place] (Transfers) independent  -SC       Row Name 06/04/25 1108          Stand-Sit Transfer    Stand-Sit [place] (Transfers) independent  -[facility] Name 06/04/25 1108          Functional Mobility    Functional Mobility- Ind. Level independent  -SC     Functional Mobility- Comment Patient walking back and forth from bed to bathroom at a SBA level with one staff to push IV pole.  -[facility] Name 06/04/25 1108          Activities of Daily Living    BADL Assessment/Intervention --  Patient demosntrates SBA for all basic ADLs.  -SC               User Key  (r) = Recorded By, (t) = Taken By, (c) = Cosigned By      Initials Name Provider Type    Kaylee Eddy OT Occupational Therapist                   Obj/Interventions       Row Name 06/04/25 1109          Sensory Assessment (Somatosensory)    Sensory Assessment (Somatosensory) sensation intact  -[facility] Name 06/04/25 1109          Vision Assessment/Intervention    Visual Impairment/Limitations WFL  -[facility] Name 06/04/25 1109          Range of Motion Comprehensive    General Range of Motion bilateral upper extremity ROM WNL  -[facility] Name 06/04/25 1109          Strength Comprehensive (MMT)    General Manual Muscle Testing (MMT) Assessment no strength deficits identified  -[facility] Name 06/04/25 1109           Motor Skills    Motor Skills coordination;functional endurance  -SC     Coordination WFL  -SC     Functional Endurance good  -[facility] Name 06/04/25 1109          Balance    Balance Assessment standing static balance;standing dynamic balance  -SC     Static Standing Balance independent  -SC     Dynamic Standing Balance standby assist  -SC               User Key  (r) = Recorded By, (t) = Taken By, (c) = Cosigned By      Initials Name Provider Type    SC Kaylee Lomas, OT Occupational Therapist                   Goals/Plan    No documentation.                  Clinical Impression       [facility] Name 06/04/25 1110          Pain Assessment    Pretreatment Pain Rating 0/10 - no pain  -SC     Posttreatment Pain Rating 0/10 - no pain  -[facility] Name 06/04/25 1110          Plan of Care Review    Progress no change  Evaluation complete  -SC     Outcome Evaluation Patient presents with no limitations that will prevent her from safely returning to her PLOF. Plan to discharge Occupational Therapy services. Please re-consult if new needs arise.  -[facility] Name 06/04/25 1110          Therapy Assessment/Plan (OT)    Criteria for Skilled Therapeutic Interventions Met (OT) no problems identified which require skilled intervention  -SC     Therapy Frequency (OT) evaluation only  -SC       Row Name 06/04/25 1110          Therapy Plan Review/Discharge Plan (OT)    Anticipated Discharge Disposition (OT) home  -[facility] Name 06/04/25 1110          Positioning and Restraints    Pre-Treatment Position in bed  -SC     Post Treatment Position bed  -SC     In Bed call light within reach;encouraged to call for assist  -SC               User Key  (r) = Recorded By, (t) = Taken By, (c) = Cosigned By      Initials Name Provider Type    SC Kaylee Lomas, OT Occupational Therapist                   Outcome Measures       Row Name 06/04/25 1111          How much help from another is currently needed...    Putting on and taking off regular  lower body clothing? 4  -SC     Bathing (including washing, rinsing, and drying) 4  -SC     Toileting (which includes using toilet bed pan or urinal) 4  -SC     Putting on and taking off regular upper body clothing 4  -SC     Taking care of personal grooming (such as brushing teeth) 4  -SC     Eating meals 4  -SC     AM-PAC 6 Clicks Score (OT) 24  -SC       Row Name 06/04/25 0900 06/04/25 0738       How much help from another person do you currently need...    Turning from your back to your side while in flat bed without using bedrails? 4  -BOBY 4  -EW    Moving from lying on back to sitting on the side of a flat bed without bedrails? 4  -BOBY 4  -EW    Moving to and from a bed to a chair (including a wheelchair)? 4  -BOBY 4  -EW    Standing up from a chair using your arms (e.g., wheelchair, bedside chair)? 4  -BOBY 4  -EW    Climbing 3-5 steps with a railing? 3  -BOBY 3  -EW    To walk in hospital room? 4  -BOBY 3  -EW    AM-PAC 6 Clicks Score (PT) 23  -BOBY 22  -EW    Highest Level of Mobility Goal Walk 25 Feet or More-7  -BOBY Walk 25 Feet or More-7  -EW      Row Name 06/04/25 1111 06/04/25 0900       Functional Assessment    Outcome Measure Options AM-PAC 6 Clicks Daily Activity (OT);Optimal Instrument  -SC AM-PAC 6 Clicks Basic Mobility (PT)  -BOBY      Row Name 06/04/25 1111          Optimal Instrument    Optimal Instrument Optimal - 3  -SC     Bending/Stooping 1  -SC     Standing 1  -SC     Reaching 1  -SC     From the list, choose the 3 activities you would most like to be able to do without any difficulty Reaching;Standing;Bending/stooping  -SC     Total Score Optimal - 3 3  -SC               User Key  (r) = Recorded By, (t) = Taken By, (c) = Cosigned By      Initials Name Provider Type    Sam Ferrara, PT Physical Therapist    Estelle Galindo, RN Registered Nurse    Kaylee Eddy OT Occupational Therapist                    Occupational Therapy Education       Title: PT OT SLP Therapies (Done)       Topic:  Occupational Therapy (Done)       Point: ADL training (Done)       Learning Progress Summary            Patient Acceptance, E, VU by SC at 6/4/2025 1112                      Point: Home exercise program (Done)       Learning Progress Summary            Patient Acceptance, E, VU by SC at 6/4/2025 1112                      Point: Precautions (Done)       Learning Progress Summary            Patient Acceptance, E, VU by SC at 6/4/2025 1112                      Point: Body mechanics (Done)       Learning Progress Summary            Patient Acceptance, E, VU by SC at 6/4/2025 1112                                      User Key       Initials Effective Dates Name Provider Type [facility] 02/05/24 -  Kaylee Lomas OT Occupational Therapist OT                  OT Recommendation and Plan  Therapy Frequency (OT): evaluation only  Plan of Care Review  Progress: no change (Evaluation complete)  Outcome Evaluation: Patient presents with no limitations that will prevent her from safely returning to her PLOF. Plan to discharge Occupational Therapy services. Please re-consult if new needs arise.     Time Calculation:   Evaluation Complexity (OT)  Review Occupational Profile/Medical/Therapy History Complexity: expanded/moderate complexity  Assessment, Occupational Performance/Identification of Deficit Complexity: 1-3 performance deficits  Clinical Decision Making Complexity (OT): problem focused assessment/low complexity  Overall Complexity of Evaluation (OT): low complexity     Time Calculation- OT       Row Name 06/04/25 1112             Time Calculation- OT    OT Received On 06/04/25  -SC         Untimed Charges    OT Eval/Re-eval Minutes 25  -SC         Total Minutes    Untimed Charges Total Minutes 25  -SC       Total Minutes 25  -SC                User Key  (r) = Recorded By, (t) = Taken By, (c) = Cosigned By      Initials Name Provider Type    SC Kaylee Lomas OT Occupational Therapist                  Therapy Charges  for Today       Code Description Service Date Service Provider Modifiers Qty    [id number] HC OT EVAL MOD COMPLEXITY 2 6/4/2025 Kaylee Lomas OT GO 1                 Kaylee Lomas OT  6/4/2025

## 2025-06-05 ENCOUNTER — READMISSION MANAGEMENT (OUTPATIENT)
Dept: CALL CENTER | Facility: HOSPITAL | Age: 65
End: 2025-06-05
Payer: MEDICARE

## 2025-06-05 VITALS
BODY MASS INDEX: 30.63 KG/M2 | HEIGHT: 65 IN | SYSTOLIC BLOOD PRESSURE: 121 MMHG | WEIGHT: 183.86 LBS | HEART RATE: 83 BPM | RESPIRATION RATE: 18 BRPM | DIASTOLIC BLOOD PRESSURE: 69 MMHG | OXYGEN SATURATION: 95 % | TEMPERATURE: 97.7 F

## 2025-06-05 PROBLEM — R53.1 WEAKNESS: Status: RESOLVED | Noted: 2025-06-01 | Resolved: 2025-06-05

## 2025-06-05 LAB
ANION GAP SERPL CALCULATED.3IONS-SCNC: 9.2 MMOL/L (ref 5–15)
BUN SERPL-MCNC: 8.9 MG/DL (ref 8–23)
BUN/CREAT SERPL: 10.9 (ref 7–25)
CALCIUM SPEC-SCNC: 8.5 MG/DL (ref 8.6–10.5)
CHLORIDE SERPL-SCNC: 108 MMOL/L (ref 98–107)
CO2 SERPL-SCNC: 22.8 MMOL/L (ref 22–29)
CREAT SERPL-MCNC: 0.82 MG/DL (ref 0.57–1)
DEPRECATED RDW RBC AUTO: 46.3 FL (ref 37–54)
EGFRCR SERPLBLD CKD-EPI 2021: 81 ML/MIN/1.73
ERYTHROCYTE [DISTWIDTH] IN BLOOD BY AUTOMATED COUNT: 13.4 % (ref 12.3–15.4)
GALACTOMANNAN AG SPEC IA-ACNC: 0.04 INDEX (ref 0–0.49)
GLUCOSE SERPL-MCNC: 99 MG/DL (ref 65–99)
H CAPSUL AG UR QL IA: NEGATIVE
HCT VFR BLD AUTO: 36.1 % (ref 34–46.6)
HGB BLD-MCNC: 11.8 G/DL (ref 12–15.9)
MCH RBC QN AUTO: 30.4 PG (ref 26.6–33)
MCHC RBC AUTO-ENTMCNC: 32.7 G/DL (ref 31.5–35.7)
MCV RBC AUTO: 93 FL (ref 79–97)
PHOSPHATE SERPL-MCNC: 2.9 MG/DL (ref 2.5–4.5)
PLATELET # BLD AUTO: 202 10*3/MM3 (ref 140–450)
PMV BLD AUTO: 9.9 FL (ref 6–12)
POTASSIUM SERPL-SCNC: 3.7 MMOL/L (ref 3.5–5.2)
RBC # BLD AUTO: 3.88 10*6/MM3 (ref 3.77–5.28)
SODIUM SERPL-SCNC: 140 MMOL/L (ref 136–145)
WBC NRBC COR # BLD AUTO: 6.86 10*3/MM3 (ref 3.4–10.8)

## 2025-06-05 PROCEDURE — 80048 BASIC METABOLIC PNL TOTAL CA: CPT | Performed by: STUDENT IN AN ORGANIZED HEALTH CARE EDUCATION/TRAINING PROGRAM

## 2025-06-05 PROCEDURE — 99239 HOSP IP/OBS DSCHRG MGMT >30: CPT | Performed by: STUDENT IN AN ORGANIZED HEALTH CARE EDUCATION/TRAINING PROGRAM

## 2025-06-05 PROCEDURE — 84100 ASSAY OF PHOSPHORUS: CPT | Performed by: STUDENT IN AN ORGANIZED HEALTH CARE EDUCATION/TRAINING PROGRAM

## 2025-06-05 PROCEDURE — 25010000002 ENOXAPARIN PER 10 MG: Performed by: INTERNAL MEDICINE

## 2025-06-05 PROCEDURE — 85027 COMPLETE CBC AUTOMATED: CPT | Performed by: STUDENT IN AN ORGANIZED HEALTH CARE EDUCATION/TRAINING PROGRAM

## 2025-06-05 PROCEDURE — 99232 SBSQ HOSP IP/OBS MODERATE 35: CPT | Performed by: STUDENT IN AN ORGANIZED HEALTH CARE EDUCATION/TRAINING PROGRAM

## 2025-06-05 PROCEDURE — 25010000002 CEFEPIME PER 500 MG: Performed by: INTERNAL MEDICINE

## 2025-06-05 RX ORDER — CEFDINIR 300 MG/1
300 CAPSULE ORAL 2 TIMES DAILY
Qty: 10 CAPSULE | Refills: 0 | Status: SHIPPED | OUTPATIENT
Start: 2025-06-05 | End: 2025-06-10

## 2025-06-05 RX ADMIN — Medication 10 ML: at 09:14

## 2025-06-05 RX ADMIN — ENOXAPARIN SODIUM 40 MG: 40 INJECTION SUBCUTANEOUS at 09:14

## 2025-06-05 RX ADMIN — CEFEPIME 2000 MG: 2 INJECTION, POWDER, FOR SOLUTION INTRAVENOUS at 03:52

## 2025-06-05 RX ADMIN — LEVOTHYROXINE SODIUM 50 MCG: 0.05 TABLET ORAL at 05:57

## 2025-06-05 NOTE — PROGRESS NOTES
Enter Query Response Below      Query Response: Bacterial pneumonia, unspecified             If applicable, please update the problem list.      [facility]   Physical Therapy Treatment Note        Name: Xochitl [facility] Number: [id number]    Therapy Diagnosis:   Encounter Diagnoses   Name Primary?    Cervical spondylosis Yes    Decreased ROM of neck     Decreased ROM of thoracic spine     Myofascial pain      Physician: Maggy Weeks NP    Visit Date: 10/8/2024    Physician Orders: PT Eval and Treat   Medical Diagnosis from Referral: M47.812 (ICD-10-CM) - Cervical spondylosis   Evaluation Date: 9/24/2024  Authorization Period Expiration: 9/13/25  Plan of Care Expiration: 12/24/24  Progress Note Due: 10/24/24  Visit # / Visits authorized: 1/ 1 , 2/10   FOTO: 1/ 3     Precautions: Standard     PTA Visit #: 0/5   Date of last FOTO:9/24/24    Time In: 8:07am  Time Out: 8:52 am  Total Billable Time: 45  minutes    SUBJECTIVE     Pt reports: she will have an epidural on Thurs. Her neck flared up after sitting for a zoom meeting, but was better after flexeril, manual massage and massage chair   She was compliant with home exercise program.  Response to previous treatment: reduced neck tension   Functional change: none    Pain: 1/10  Location: right upper trap tightness     OBJECTIVE     Objective Measures updated at progress report unless specified.     Treatment     Xochitl received the treatments listed below:  (new treatments are indicated in bold)    therapeutic exercises to develop strength, endurance, ROM, and flexibility for 8 minutes including:    [x] UBE 3'fwd/3'bkwd   []    manual therapy techniques: Joint mobilizations, Manual traction, Myofacial release, and Soft tissue Mobilization were applied to the: cervical spine for 0   minutes, including:  []suboccipital release   [] PA C3-6 joint mobs in supine  [] manual cervical traction   [] STM R upper trap and cervical paraspinals in L sidelying         neuromuscular re-education activities to improve: Coordination, Kinesthetic, Sense, Proprioception, and  Posture for 27  minutes. The following activities were included:  [] cervical retractions x10   [x] cervical retractions with YTB x15   [x] iso cervical SB Ylw TB x10 B   [x] open books on wall x10 B   [x] 1/2 foam roller on wall: snow angels x10  [x] straight arm pull downs Red TB 2x10       therapeutic activities to improve functional performance for 0  minutes, including:    []  []    [x]Mechanical Static Cervical Traction performed for 10 min at 12# of force    Patient Education and Home Exercises     Home Exercises Provided and Patient Education Provided     Education provided:   - HEP  -educated on home cervical traction devices     Written Home Exercises Provided: Patient instructed to cont prior HEP. Exercises were reviewed and Xochitl was able to demonstrate them prior to the end of the session.  Xochitl demonstrated good  understanding of the education provided. See EMR under Patient Instructions for exercises provided during therapy sessions    ASSESSMENT     Pt tolerated session well. Progressed postural training with foam roll mobility exercise and resisted lawson-scap strengthening. Progressed cervical NMR for improved postural stability. Progress as tolerated.     Xochitl Is progressing well towards her goals.   Pt prognosis is Fair.     Pt will continue to benefit from skilled outpatient physical therapy to address the deficits listed in the problem list box on initial evaluation, provide pt/family education and to maximize pt's level of independence in the home and community environment.     Pt's spiritual, cultural and educational needs considered and pt agreeable to plan of care and goals.     Anticipated barriers to physical therapy: chronicity of pain, comorbidities     Goals: Short Term Goals: 6 visits  1. Pt will be (I) /c HEP to supplement PT in order to improve functional tasks  2. Pt will improve B cervical rotation range of motion >/= 45 deg for safety with driving  3. Pt will report  reduction in morning headaches </= 4 days/week      Long Term Goals: 12 visits   1. Pt will improve B lawson-scaps MMTs to 4-/5 to improve strength for functional activities  2. Pt will improve FOTO score to </= 55% to reduce perceived pain with mobility   3. Pt will report improved ability to sleep without pain >/= 4 nights/week     PLAN     Plan of care Certification: 9/24/2024 to 12/24/24.     Outpatient Physical Therapy 1-2 times weekly for 12 visits to include the following interventions: Manual Therapy, Moist Heat/ Ice, Neuromuscular Re-ed, Patient Education, Self Care, Therapeutic Activities, and Therapeutic Exercise, E-stim and Dry Needling as needed.     Benita Contreras, PT

## 2025-06-05 NOTE — PLAN OF CARE
Goal Outcome Evaluation:  Plan of Care Reviewed With: patient        Progress: no change  Outcome Evaluation: Patient medically cleared by MD for discharge.

## 2025-06-05 NOTE — OUTREACH NOTE
Prep Survey      Flowsheet Row Responses   [facility] patient discharged from? [facility]   Is LACE score < 7 ? No   Eligibility [facility]   Date of Admission 06/01/25   Date of Discharge 06/05/25   Discharge Disposition Home or Self Care   Discharge diagnosis weakness   Does the patient have one of the following disease processes/diagnoses(primary or secondary)? Other   Does the patient have Home health ordered? No   Is there a DME ordered? No   Prep survey completed? Yes            Neelima NGUYEN - Registered Nurse

## 2025-06-05 NOTE — DISCHARGE SUMMARY
[facility]         HOSPITALIST  DISCHARGE SUMMARY    Patient Name: Oksana Smith  : 1960  MRN: [id number]    Date of Admission: 2025  Date of Discharge:  25  Primary Care Physician: Portia Serna MD    Consults       Date and Time Order Name Status Description    2025  8:11 AM Inpatient Pulmonology Consult Completed     2025  2:47 PM Hospitalist (on-call MD unless specified)              Active and Resolved Hospital Problems:  Active Hospital Problems   No active problems to display.      Resolved Hospital Problems    Diagnosis POA    **Weakness [R53.1] Yes       Hospital Course     Hospital Course:  Oksana Smith is a 64 y.o. female with low-grade follicular lymphoma (followed at [facility], not currently on treatment), hypothyroidism, hypertension who presented to the ED with complaints of generalized weakness, fatigue and fever.  In ED patient was found to have a temperature of 104.7 °F.  CXR imaging showed left basilar opacity.  Hospitalist service called in for further evaluation management.  Empiric antibiotic started.  CT chest imaging obtained and dense consolidation in the right lower lobe noted.  Pulmonology consulted to assist in care.  Improved with cefepime while inpatient.  Will finish a course of cefdinir outpatient.  Patient cleared by pulmonology for discharge.  Patient is medically stable for discharge home         DISCHARGE Follow Up Recommendations for labs and diagnostics:   - Follow-up with PCP  -Follow-up with pulmonology    Day of Discharge     Vital Signs:  Temp:  [97.7 °F (36.5 °C)-98.2 °F (36.8 °C)] 97.7 °F (36.5 °C)  Heart Rate:  [] 83  Resp:  [16-18] 18  BP: (109-140)/(67-84) 121/69  Physical Exam:   General: No acute distress  Cardiovascular: Normal S1, S2  Pulmonary: Normal work of breathing    Discharge Details        Discharge Medications        New Medications        Instructions Start Date   cefdinir 300 MG  capsule  Commonly known as: OMNICEF   300 mg, Oral, 2 Times Daily             Changes to Medications        Instructions Start Date   vitamin D 1.25 MG (03602 UT) capsule capsule  Commonly known as: ERGOCALCIFEROL  What changed: additional instructions   50,000 Units, Oral, Every 7 Days             Continue These Medications        Instructions Start Date   ammonium lactate 12 % lotion  Commonly known as: AmLactin   Topical, As Needed      clotrimazole-betamethasone 1-0.05 % cream  Commonly known as: Lotrisone   1 Application, Topical, 2 Times Daily      levothyroxine 50 MCG tablet  Commonly known as: Synthroid   50 mcg, Oral, Daily      nystatin 334427 UNIT/GM powder  Commonly known as: MYCOSTATIN   Topical, 3 Times Daily      pravastatin 10 MG tablet  Commonly known as: PRAVACHOL   10 mg, Oral, Daily      triamterene-hydrochlorothiazide 37.5-25 MG per tablet  Commonly known as: MAXZIDE-25   0.5 tablets, Oral, Daily               Allergies   Allergen Reactions    Clindamycin Hives, Itching and Nausea And Vomiting    Erythromycin Hives, Itching, GI Intolerance and Unknown - High Severity    Oxycodone-Acetaminophen Hallucinations       Discharge Disposition:      Diet:  Hospital:  Diet Order   Procedures    Diet: Regular/House; Fluid Consistency: Thin (IDDSI 0)       Discharge Activity:   Activity Instructions       Activity as Tolerated              CODE STATUS:  Code Status and Medical Interventions: CPR (Attempt to Resuscitate); Full Support   Ordered at: 06/01/25 1739     Code Status (Patient has no pulse and is not breathing):    CPR (Attempt to Resuscitate)     Medical Interventions (Patient has pulse or is breathing):    Full Support     Level Of Support Discussed With:    Patient         Future Appointments   Date Time Provider Department Center   11/10/2025  9:15 AM NURSE/WAQAR GAINES [facility]   11/17/2025  9:15 AM Portia Serna MD [facility]       Additional Instructions  for the Follow-ups that You Need to Schedule       Discharge Follow-up with PCP   As directed       Currently Documented PCP:    Portia Serna MD    PCP Phone Number:    [phone]     Follow Up Details: 1 week                Pertinent  and/or Most Recent Results         LAB RESULTS:      Lab 06/05/25  0553 06/04/25  0735 06/03/25  0541 06/02/25  0553 06/01/25  1420 06/01/25  1044 05/30/25  1350 05/30/25  1111   WBC 6.86 8.30  --  12.82*  --  14.40*  --  14.46*   HEMOGLOBIN 11.8* 11.7*  --  13.6  --  13.8  --  15.5   HEMATOCRIT 36.1 35.6  --  41.5  --  41.5  --  46.5   PLATELETS 202 183  --  252  --  284  --  355   NEUTROS ABS  --   --   --  10.76*  --  12.04*  --  11.27*   IMMATURE GRANS (ABS)  --   --   --  0.16*  --  0.24*  --  0.07*   LYMPHS ABS  --   --   --  1.27  --  1.12  --  1.81   MONOS ABS  --   --   --  0.46  --  0.92*  --  1.13*   EOS ABS  --   --   --  0.06  --  0.01  --  0.08   MCV 93.0 93.0  --  93.5  --  91.8  --  92.4   CRP  --   --  20.81*  --   --   --   --   --    PROCALCITONIN  --   --   --  0.29* 0.27*  --   --   --    LACTATE  --   --   --   --  1.1  --  0.6  --          Lab 06/05/25  0553 06/04/25  0735 06/03/25  0541 06/02/25  0553 06/01/25  1044   SODIUM 140 136 135* 135* 135*   POTASSIUM 3.7 3.6 3.2* 3.7 3.9   CHLORIDE 108* 104 103 102 99   CO2 22.8 23.2 23.0 23.4 25.0   ANION GAP 9.2 8.8 9.0 9.6 11.0   BUN 8.9 9.6 12.2 15.8 19.1   CREATININE 0.82 0.83 0.99 1.13* 1.37*   EGFR 81.0 79.8 64.6 55.1* 43.7*   GLUCOSE 99 88 105* 108* 125*   CALCIUM 8.5* 8.2* 7.9* 8.1* 8.2*   MAGNESIUM  --   --   --   --  1.7   PHOSPHORUS 2.9 1.9* 1.8*  --   --          Lab 06/03/25  0541 06/01/25  1044 05/30/25  1218   TOTAL PROTEIN  --  6.6 6.8   ALBUMIN 2.7* 3.5 3.6   GLOBULIN  --  3.1 3.2   ALT (SGPT)  --  7 9   AST (SGOT)  --  11 11   BILIRUBIN  --  0.6 0.8   ALK PHOS  --  60 70         Lab 06/03/25  0541 06/01/25  1420 06/01/25  1044   PROBNP 258.4  --   --    HSTROP T  --  11 14*                  Brief Urine Lab Results  (Last result in the past 365 days)        Color   Clarity   Blood   Leuk Est   Nitrite   Protein   CREAT   Urine HCG        06/01/25 1153 Yellow   Clear   Small (1+)   Negative   Negative   30 mg/dL (1+)                 Microbiology Results (last 10 days)       Procedure Component Value - Date/Time    MRSA Screen, PCR (Inpatient) - Swab, Nares [[id number]]  (Normal) Collected: 06/02/25 1245    Lab Status: Final result Specimen: Swab from Nares Updated: 06/02/25 1414     MRSA PCR No MRSA Detected    Narrative:      The negative predictive value of this diagnostic test is high and should only be used to consider de-escalating anti-MRSA therapy. A positive result may indicate colonization with MRSA and must be correlated clinically.    Blood Culture - Blood, Arm, Right [[id number]]  (Normal) Collected: 06/01/25 1420    Lab Status: Preliminary result Specimen: Blood from Arm, Right Updated: 06/04/25 1430     Blood Culture No growth at 3 days    Blood Culture - Blood, Arm, Left [[id number]]  (Normal) Collected: 06/01/25 1420    Lab Status: Preliminary result Specimen: Blood from Arm, Left Updated: 06/04/25 1430     Blood Culture No growth at 3 days    S. Pneumo Ag Urine or CSF - Urine, Urine, Clean Catch [[id number]]  (Normal) Collected: 06/01/25 1153    Lab Status: Final result Specimen: Urine, Clean Catch Updated: 06/02/25 0836     Strep Pneumo Ag Negative    Legionella Antigen, Urine - Urine, Urine, Clean Catch [[id number]]  (Normal) Collected: 06/01/25 1153    Lab Status: Final result Specimen: Urine, Clean Catch Updated: 06/02/25 0836     LEGIONELLA ANTIGEN, URINE Negative    Mycoplasma Pneumoniae Antibody, IgM - Blood, [[id number]]  (Normal) Collected: 06/01/25 1044    Lab Status: Final result Specimen: Blood Updated: 06/02/25 0834     Mycoplasma pneumo IgM Negative    COVID PRE-OP / PRE-PROCEDURE SCREENING ORDER (NO ISOLATION) - Swab, Nasopharynx [[id number]]  (Normal) Collected: 06/01/25  1043    Lab Status: Final result Specimen: Swab from Nasopharynx Updated: 06/01/25 1209    Narrative:      The following orders were created for panel order COVID PRE-OP / PRE-PROCEDURE SCREENING ORDER (NO ISOLATION) - Swab, Nasopharynx.  Procedure                               Abnormality         Status                     ---------                               -----------         ------                     COVID-19, FLU A/B, RSV P...[[id number]]  Normal              Final result                 Please view results for these tests on the individual orders.    COVID-19, FLU A/B, RSV PCR 1 HR TAT - Swab, Nasopharynx [[id number]]  (Normal) Collected: 06/01/25 1043    Lab Status: Final result Specimen: Swab from Nasopharynx Updated: 06/01/25 1209     COVID19 Not Detected     Influenza A PCR Not Detected     Influenza B PCR Not Detected     RSV, PCR Not Detected    Narrative:      Fact sheet for providers: https://www.fda.gov/media/457174/download    Fact sheet for patients: https://www.fda.gov/media/318079/download    Test performed by PCR.    COVID-19, FLU A/B, RSV PCR 1 HR TAT - Swab, Nasopharynx [[id number]]  (Normal) Collected: 05/30/25 1154    Lab Status: Final result Specimen: Swab from Nasopharynx Updated: 05/30/25 1243     COVID19 Not Detected     Influenza A PCR Not Detected     Influenza B PCR Not Detected     RSV, PCR Not Detected    Narrative:      Fact sheet for providers: https://www.fda.gov/media/376089/download    Fact sheet for patients: https://www.fda.gov/media/429993/download    Test performed by PCR.            CT Abdomen Pelvis With Contrast  Result Date: 6/1/2025  Interval decrease in the size of the mesenteric and retroperitoneal lymph nodes since 9/23/2024. Otherwise, no acute findings are seen. Please see above comments for further detail.    Portions of this note were completed with a voice recognition program.  6/1/2025 9:39 PM by Braulio Cao MD on Workstation: [employer]      CT Chest With  Contrast Diagnostic  Result Date: 6/1/2025  Dense pulmonary consolidation is present within the lower lobe of the right lung and is most suggestive of pneumonia. Consider close interval clinical and follow-up to ensure a benign progression and to exclude an underlying malignant process. Mild atelectasis and/or pneumonia in the left lung base is (are) possible. Please see above comments for further detail.   Portions of this note were completed with a voice recognition program.  6/1/2025 9:31 PM by Braulio Cao MD on Workstation: [id number]      XR Chest 1 View  Result Date: 6/1/2025  Impression: New focal airspace opacity in the right infrahilar region from 2 days ago concerning for pneumonia/infection. There is evolving plate atelectasis in the left lung base. Electronically Signed: Afshin Zepeda DO  6/1/2025 11:35 AM EDT  Workstation ID: [id number]    XR Chest 1 View  Result Date: 5/30/2025  Impression: Chronic left hemidiaphragm elevation. Left basilar opacity thought to represent chronic passive atelectasis, without significant interval change. 2. No new chest findings. Electronically Signed: Alexandra Landaverde MD  5/30/2025 12:50 PM EDT  Workstation ID: [id number]       Results for orders placed during the hospital encounter of 12/16/23    Duplex Venous Lower Extremity - Left CAR    Interpretation Summary    Normal left lower extremity venous duplex scan.      Results for orders placed during the hospital encounter of 12/16/23    Duplex Venous Lower Extremity - Left CAR    Interpretation Summary    Normal left lower extremity venous duplex scan.          Labs Pending at Discharge:  Pending Labs       Order Current Status    Aspergillus Galactomannan Antigen - Blood, Arm, Left In process    Histoplasma Ag Ur - Urine, Urine, Clean Catch In process    Blood Culture - Blood, Arm, Left Preliminary result    Blood Culture - Blood, Arm, Right Preliminary result              Time spent on Discharge including face to face  service:  35 minutes    Electronically signed by Blayne Kong MD, 06/05/25, 11:24 AM EDT.

## 2025-06-05 NOTE — PLAN OF CARE
Goal Outcome Evaluation:  Plan of Care Reviewed With: patient           Outcome Evaluation: IV abx continued. VSS. No acute changes overnight.

## 2025-06-05 NOTE — PROGRESS NOTES
Pulmonary / Critical Care Progress Note      Patient Name: Oksana Smith  : 1960  MRN: [id number]  Attending:  Blayne Kong MD  Date of admission: 2025    Subjective   Subjective   Follow-up for multifocal pneumonia    Over past 24 hours:  Sitting up in chair  On room air  Reports feeling better but still has a nonproductive cough  No fever, chills    Review of Systems  General: Denied complaints  Cardiovascular:  Denied complaints  Respiratory: Denied complaints  Gastrointestinal: Denied complaints        Objective   Objective     Vitals:   Temp:  [97.9 °F (36.6 °C)-98.2 °F (36.8 °C)] 97.9 °F (36.6 °C)  Heart Rate:  [] 83  Resp:  [16-18] 18  BP: (109-140)/(67-84) 125/70    Physical Exam   Vital Signs Reviewed   General:  WDWN, Alert, NAD.    HEENT:  PERRL, EOMI.  OP, nares clear  Chest:  good aeration, clear to auscultation bilaterally, no work of breathing noted  CV: RRR, no MGR, pulses 2+, equal.  Abd:  Soft, NT, ND, + BS  EXT:  no clubbing, no cyanosis, no edema  Neuro:  A&Ox3, CN grossly intact, no focal deficits.  Skin: No rashes or lesions noted      Result Review    Result Review:  I have personally reviewed the results from the time of this admission to 2025 10:06 EDT and agree with these findings:  []  Laboratory  []  Microbiology  []  Radiology  []  EKG/Telemetry   []  Cardiology/Vascular   []  Pathology  []  Old records  []  Other:  Most notable findings include:   -     Assessment & Plan   Assessment / Plan     Active Hospital Problems:  Active Hospital Problems    Diagnosis     **Weakness          Impression:  Multifocal pneumonia, unspecified organism  Elevated procalcitonin  Questionable acute kidney injury  Leukocytosis  Abnormal chest CT  History of lymphoma  Therapeutic drug monitoring of vancomycin  Hypokalemia  Hypophosphatemia     Plan:  Can use supplemental oxygen as needed to keep SpO2 greater than 90%  CT of chest personally reviewed.  Bilateral right greater than  left dense consolidations of lower lobes consistent with multifocal pneumonia  MRSA nares negative.  Vancomycin stopped.    Micro has been negative.  Can transition to oral cefdinir to complete total 7 days of antibiotics.    Micro has been negative thus far.  Lactic acid 1.1.  Procalcitonin 0.27.  Blood cultures no growth to date  Fungal study ordered, continue to follow  Continue with nebs and bronchopulmonary hygiene  Encourage I-S and flutter valve use  Replace electrolytes to keep K4, mag 2, Phos 4.  Patient can be discharged from a pulmonary standpoint to complete her oral antibiotics.  Follow-up in pulmonary clinic in 1 to 2 weeks    VTE Prophylaxis:  Pharmacologic & mechanical VTE prophylaxis orders are present.        CODE STATUS:   Code Status (Patient has no pulse and is not breathing): CPR (Attempt to Resuscitate)  Medical Interventions (Patient has pulse or is breathing): Full Support  Level Of Support Discussed With: Patient      Labs, images, and medications personally reviewed.  Discussed with patient

## 2025-06-06 ENCOUNTER — TRANSITIONAL CARE MANAGEMENT TELEPHONE ENCOUNTER (OUTPATIENT)
Dept: CALL CENTER | Facility: HOSPITAL | Age: 65
End: 2025-06-06
Payer: MEDICARE

## 2025-06-06 LAB
BACTERIA SPEC AEROBE CULT: NORMAL
BACTERIA SPEC AEROBE CULT: NORMAL

## 2025-06-06 NOTE — OUTREACH NOTE
Call Center TCM Note      Flowsheet Row Responses   [facility] patient discharged from? [facility]   Does the patient have one of the following disease processes/diagnoses(primary or secondary)? Other   TCM attempt successful? Yes  [vr for  Chris and dtr Vicki]   Call start time 0939   Call end time 0946   Discharge diagnosis weakness/pneumonia   Meds reviewed with patient/caregiver? Yes   Is the patient having any side effects they believe may be caused by any medication additions or changes? No   Does the patient have all medications ordered at discharge? Yes   Is the patient taking all medications as directed (includes completed medication regime)? Yes   Comments TCM FOLLOW UP APPOINTMENT IS 6/11/25@1045am (appt in place at time of call) pt will also f/u with pulmonary   Does the patient have an appointment with their PCP within 7-14 days of discharge? Yes   Nursing Interventions Confirmed date/time of appointment   Has home health visited the patient within 72 hours of discharge? N/A   Psychosocial issues? No   Did the patient receive a copy of their discharge instructions? Yes   Nursing interventions Reviewed instructions with patient   What is the patient's perception of their health status since discharge? Same  [Reports continued cough, no SOA, no fever. Using aerobika, taking atbs, encouraged DB.  Reviewed resp s/s to monitor and seek care if present.]   Is the patient/caregiver able to teach back signs and symptoms related to disease process for when to call PCP? Yes   Is the patient/caregiver able to teach back signs and symptoms related to disease process for when to call 911? Yes   TCM call completed? Yes   Call end time 0946            ROSA Carver Registered Nurse    6/6/2025, 09:47 EDT

## 2025-06-08 LAB
QT INTERVAL: 312 MS
QTC INTERVAL: 441 MS

## 2025-06-11 ENCOUNTER — OFFICE VISIT (OUTPATIENT)
Dept: INTERNAL MEDICINE | Facility: CLINIC | Age: 65
End: 2025-06-11
Payer: MEDICARE

## 2025-06-11 VITALS
RESPIRATION RATE: 18 BRPM | DIASTOLIC BLOOD PRESSURE: 70 MMHG | BODY MASS INDEX: 29.49 KG/M2 | WEIGHT: 177 LBS | SYSTOLIC BLOOD PRESSURE: 118 MMHG | TEMPERATURE: 98 F | HEIGHT: 65 IN | OXYGEN SATURATION: 98 % | HEART RATE: 88 BPM

## 2025-06-11 DIAGNOSIS — J18.9 PNEUMONIA OF RIGHT LOWER LOBE DUE TO INFECTIOUS ORGANISM: Primary | ICD-10-CM

## 2025-06-11 NOTE — PROGRESS NOTES
Transitional Care Follow Up Visit  Subjective     Oksana is a 64 y.o. female who presents for a transitional care management visit.    Within 48 business hours after discharge our office contacted her via telephone to coordinate her care and needs.      I reviewed and discussed the details of that call along with the discharge summary, hospital problems, inpatient lab results, inpatient diagnostic studies, and consultation reports with Oksana.     Current outpatient and discharge medications have been reconciled for the patient.  Reviewed by: Portia Serna MD          6/5/2025     5:31 PM   Date of TCM Phone Call   [facility]   Date of Admission 6/1/2025   Date of Discharge 6/5/2025   Discharge Disposition Home or Self Care       Risk for Readmission (LACE) Score: 11 (6/5/2025  6:00 AM)      History of Present Illness     Course During Hospital Stay The following information was reviewed by: Portia Serna MD on 06/11/2025:     Hospital Course:  Oksana Smith is a 64 y.o. female with low-grade follicular lymphoma ([facility] not currently on treatment), hypothyroidism, hypertension who presented to the ED with complaints of generalized weakness, fatigue and fever.  In ED patient was found to have a temperature of 104.7 °F.  CXR imaging showed left basilar opacity.  Hospitalist service called in for further evaluation management.  Empiric antibiotic started.  CT chest imaging obtained and dense consolidation in the right lower lobe noted.  Pulmonology consulted to assist in care.  Improved with cefepime while inpatient.  Will finish a course of cefdinir outpatient.  Patient cleared by pulmonology for discharge.  Patient is medically stable for discharge home        The following portions of the patient's history were reviewed and updated as appropriate: allergies, current medications, past family history, past medical history, past social history, past surgical  "history, and problem list.       Since Discharge:  She is doing well. She has completed her course of oral abx and is starting to feel better. She awaiting scheduling of pulmonology follow up. Will need follow up chest imaging in 4-6 weeks to assess for resolution of abnormal findings.   She has followed up with her oncologist and had labs done.     Vitals:    06/11/25 1027   BP: 118/70   BP Location: Right arm   Patient Position: Sitting   Cuff Size: Adult   Pulse: 88   Resp: 18   Temp: 98 °F (36.7 °C)   TempSrc: Temporal   SpO2: 98%   Weight: 80.3 kg (177 lb)   Height: 165.1 cm (65\")       Result Review :   The following data was reviewed by: Portia Srena MD on 06/11/2025:  CMP          6/3/2025    05:41 6/4/2025    07:35 6/5/2025    05:53   CMP   Glucose 105  88  99    BUN 12.2  9.6  8.9    Creatinine 0.99  0.83  0.82    EGFR 64.6  79.8  81.0    Sodium 135  136  140    Potassium 3.2  3.6  3.7    Chloride 103  104  108    Calcium 7.9  8.2  8.5    Albumin 2.7      BUN/Creatinine Ratio 12.3  11.6  10.9    Anion Gap 9.0  8.8  9.2      CBC w/diff          6/2/2025    05:53 6/4/2025    07:35 6/5/2025    05:53   CBC w/Diff   WBC 12.82  8.30  6.86    RBC 4.44  3.83  3.88    Hemoglobin 13.6  11.7  11.8    Hematocrit 41.5  35.6  36.1    MCV 93.5  93.0  93.0    MCH 30.6  30.5  30.4    MCHC 32.8  32.9  32.7    RDW 13.2  13.4  13.4    Platelets 252  183  202    Neutrophil Rel % 83.9      Immature Granulocyte Rel % 1.2      Lymphocyte Rel % 9.9      Monocyte Rel % 3.6      Eosinophil Rel % 0.5      Basophil Rel % 0.9        CT Abdomen Pelvis With Contrast  Result Date: 6/1/2025  Interval decrease in the size of the mesenteric and retroperitoneal lymph nodes since 9/23/2024. Otherwise, no acute findings are seen. Please see above comments for further detail.    Portions of this note were completed with a voice recognition program.  6/1/2025 9:39 PM by Braulio Cao MD on Workstation: [email or web address]      CT Chest With " Contrast Diagnostic  Result Date: 6/1/2025  Dense pulmonary consolidation is present within the lower lobe of the right lung and is most suggestive of pneumonia. Consider close interval clinical and follow-up to ensure a benign progression and to exclude an underlying malignant process. Mild atelectasis and/or pneumonia in the left lung base is (are) possible. Please see above comments for further detail.   Portions of this note were completed with a voice recognition program.  6/1/2025 9:31 PM by Braulio Cao MD on Workstation: [email or web address]      XR Chest 1 View  Result Date: 6/1/2025  Impression: New focal airspace opacity in the right infrahilar region from 2 days ago concerning for pneumonia/infection. There is evolving plate atelectasis in the left lung base. Electronically Signed: Afshinjorge luis Zepeda DO  6/1/2025 11:35 AM EDT  Workstation ID: [id number]           Review of Systems    Objective   Physical Exam  Vitals reviewed.   Constitutional:       Appearance: Normal appearance. She is well-developed.   HENT:      Head: Normocephalic and atraumatic.      Mouth/Throat:      Pharynx: No oropharyngeal exudate.   Eyes:      Conjunctiva/sclera: Conjunctivae normal.      Pupils: Pupils are equal, round, and reactive to light.   Neck:      Thyroid: No thyromegaly or thyroid tenderness.   Cardiovascular:      Rate and Rhythm: Normal rate and regular rhythm.      Heart sounds: No murmur heard.     No friction rub. No gallop.   Pulmonary:      Effort: Pulmonary effort is normal.      Breath sounds: Normal breath sounds. No wheezing or rhonchi.   Lymphadenopathy:      Cervical: No cervical adenopathy.   Skin:     General: Skin is warm and dry.   Neurological:      Mental Status: She is alert and oriented to person, place, and time.   Psychiatric:         Mood and Affect: Affect normal.           Assessment & Plan     Assessment & Plan  Pneumonia of right lower lobe due to infectious organism  Improving as expected. Awaiting  scheduling of pulmonology follow up. Will need repeat chest imaging in 4-6 weeks to follow up resolution of abnormal findings.   She plans to have her pneumonia vaccine once she is recovered fully.              Follow Up     Return for Keep previously scheduled follow up appointment, or sooner if needed.

## 2025-06-17 ENCOUNTER — READMISSION MANAGEMENT (OUTPATIENT)
Dept: CALL CENTER | Facility: HOSPITAL | Age: 65
End: 2025-06-17
Payer: MEDICARE

## 2025-06-17 NOTE — OUTREACH NOTE
Medical Week 2 Survey      Flowsheet Row Responses   [facility] patient discharged from? [facility]   Does the patient have one of the following disease processes/diagnoses(primary or secondary)? Other   Week 2 attempt successful? Yes   Call start time 1434   Discharge diagnosis weakness/pneumonia   Call end time 1436   Person spoke with today (if not patient) and relationship Oksana   Meds reviewed with patient/caregiver? Yes   Does the patient have all medications ordered at discharge? Yes   Is the patient taking all medications as directed (includes completed medication regime)? Yes   Comments regarding appointments Hospital   Does the patient have a primary care provider?  Yes   Does the patient have an appointment with their PCP within 7 days of discharge? Yes   Comments regarding PCP Dr. Serna   Has the patient kept scheduled appointments due by today? Yes   Psychosocial issues? No   Did the patient receive a copy of their discharge instructions? Yes   Nursing interventions Reviewed instructions with patient   What is the patient's perception of their health status since discharge? Improving   If the patient is a current smoker, are they able to teach back resources for cessation? Not a smoker   Week 2 Call Completed? Yes   Graduated Yes   Is the patient interested in additional calls from an ambulatory ? No   Would this patient benefit from a Referral to Amb Social Work? No   Wrap up additional comments Patient doing well per dtr.  Denies needs.   Call end time 1436            JEFRY MYRICK - Registered Nurse

## 2025-06-26 ENCOUNTER — OFFICE VISIT (OUTPATIENT)
Dept: PULMONOLOGY | Facility: CLINIC | Age: 65
End: 2025-06-26
Payer: MEDICARE

## 2025-06-26 VITALS
HEART RATE: 103 BPM | DIASTOLIC BLOOD PRESSURE: 79 MMHG | TEMPERATURE: 98.6 F | SYSTOLIC BLOOD PRESSURE: 119 MMHG | OXYGEN SATURATION: 96 % | HEIGHT: 65 IN | BODY MASS INDEX: 28.86 KG/M2 | WEIGHT: 173.2 LBS | RESPIRATION RATE: 16 BRPM

## 2025-06-26 DIAGNOSIS — Z87.898 HISTORY OF THYMOMA: ICD-10-CM

## 2025-06-26 DIAGNOSIS — Z92.89 HISTORY OF RECENT HOSPITALIZATION: Primary | ICD-10-CM

## 2025-06-26 DIAGNOSIS — R05.3 CHRONIC COUGH: ICD-10-CM

## 2025-06-26 NOTE — PROGRESS NOTES
Primary Care Provider  Portia Serna MD   Referring Provider  Blayne Kong MD      Patient Complaint  Hospital Follow Up Visit (Sepsis with acute hypoxic respiratory failure ), Results (CT ), and Cough      Subjective          Oksana Smith presents to [facility]      History of Presenting Illness  Oksana Smith is a 64 y.o. female with recent hospitalization for multifocal pneumonia here for follow-up.    Feeling better after discharge  Still has mild, non productive cough  Using IS/FV at home  No fevers, chills; Remains a never smoker  Able to do walks at night.   I have personally reviewed patient's past family, social, medical and surgical histories and agree with their findings.    Review of Systems  Constitutional:  No fever. No chills. No weakness.  Eyes: No pain, erythema, or discharge. No blurring of vision.  ENT:  No sore throat, URI symptoms.   Cardiovascular:  No chest pain. No palpitations. No lower extremity edema.  Respiratory:  No shortness of breath, +cough, pleuritic chest pain. No hemoptysis. No dyspnea.   GI:  Normal appetite. No nausea, vomiting, diarrhea. No pain. No melena.  Musculoskeletal:  No arthralgias or myalgias.  Neurologic:  No headache. No weakness.    Family History   Problem Relation Age of Onset    Breast cancer Mother     Heart disease Mother     Cancer Mother     Arthritis Mother     Heart disease Father     Arthritis Father     Diabetes Sister     Colon cancer Neg Hx         Social History     Socioeconomic History    Marital status:    Tobacco Use    Smoking status: Never     Passive exposure: Never    Smokeless tobacco: Never   Vaping Use    Vaping status: Never Used   Substance and Sexual Activity    Alcohol use: Never    Drug use: Never    Sexual activity: Defer        Past Medical History:   Diagnosis Date    Atypical ductal hyperplasia of right breast     Breast lump     Cancer     Chronic  cough     Colon polyp     Constipation     Early satiety     Esophageal dysphagia     Fatigue     Hemidiaphragm paralysis     HTN (hypertension)     Hypothyroidism     Lateral epicondylitis of left elbow 03/14/2018    Lymphoma     OM (otitis media)     PONV (postoperative nausea and vomiting)     Rectal bleeding     Right knee pain 08/30/2018    Shortness of breath     Tear of PCL (posterior cruciate ligament) of knee, right, initial encounter 08/30/2018    Thymoma, malignant     Thyroid disorder     Vitamin B12 deficiency     Vitamin D deficiency         Immunization History   Administered Date(s) Administered    Adenovirus Type 4 12/15/1981    COVID-19 (PFIZER) 12YRS+ (COMIRNATY) 05/03/2022    COVID-19 (PFIZER) BIVALENT 12+YRS 03/07/2023    COVID-19 (PFIZER) Purple Cap Monovalent 02/01/2021, 02/05/2021, 02/22/2021, 02/27/2021, 11/02/2021    COVID-19 (UNSPECIFIED) 08/15/2024    Covid-19 (Pfizer) Gray Cap Monovalent 05/03/2022    Fluzone  >6mos 10/08/2013, 09/19/2024    Fluzone (or Fluarix & Flulaval for VFC) >6mos 10/10/2019, 09/24/2021, 09/28/2023    Hep B, Adolescent or Pediatric 04/28/1987    Hepatitis A 05/02/2018, 11/02/2018    Hepatitis B Adult/Adolescent IM 04/28/1997, 03/30/1999, 03/31/1999, 05/03/1999, 10/05/1999    Influenza Injectable Mdck Pf Quad 09/26/2017    Influenza Seasonal Injectable 10/01/2012, 10/07/2018    Influenza Whole 12/15/1981, 02/19/1982, 11/05/1984, 11/12/1987, 11/04/2010    Influenza, Unspecified 09/16/2020, 09/29/2022    MMR 11/12/1998    Measles / Rubella 02/01/1982    Meningococcal C Conjugate 12/15/1981    OPV 12/15/1981, 02/19/1982, 04/28/1987    PPD Test 05/17/1982, 05/17/1982, 06/04/1984, 06/04/1984, 05/24/1986, 09/05/1986, 06/10/1991, 06/16/1995, 11/02/1998, 06/23/2003, 03/16/2006, 03/17/2006, 01/23/2007, 01/24/2007, 03/03/2008, 10/27/2008, 06/28/2011, 03/25/2014    Plague 11/13/1985, 04/12/1987, 04/28/1987    Smallpox 12/15/1981, 02/01/1982    TD Preservative Free (Tenivac)  "02/01/2025    Td (TDVAX) 04/28/1987, 10/25/1998, 10/26/1998    Tdap 10/27/2008, 10/24/2013, 06/12/2023    Tetanus Toxoid 12/15/1981, 02/19/1982, 11/07/1990, 10/28/2008    Typhoid, Unspecified 12/15/1981, 02/19/1982, 11/13/1985, 11/16/1985    influenza Split 12/15/1981, 11/05/1984, 11/12/1987, 09/25/2009       Allergies   Allergen Reactions    Clindamycin Hives, Itching and Nausea And Vomiting    Erythromycin Hives, Itching, GI Intolerance and Unknown - High Severity    Oxycodone-Acetaminophen Hallucinations          Current Outpatient Medications:     ammonium lactate (AmLactin) 12 % lotion, Apply  topically to the appropriate area as directed As Needed for Dry Skin., Disp: 225 g, Rfl: 1    clotrimazole-betamethasone (Lotrisone) 1-0.05 % cream, Apply 1 Application topically to the appropriate area as directed 2 (Two) Times a Day., Disp: 45 g, Rfl: 1    levothyroxine (Synthroid) 50 MCG tablet, Take 1 tablet by mouth Daily., Disp: 90 tablet, Rfl: 1    nystatin (MYCOSTATIN) 967985 UNIT/GM powder, Apply  topically to the appropriate area as directed 3 (Three) Times a Day., Disp: 60 g, Rfl: 2    triamterene-hydrochlorothiazide (MAXZIDE-25) 37.5-25 MG per tablet, Take 0.5 tablets by mouth Daily., Disp: 45 tablet, Rfl: 1    vitamin D (ERGOCALCIFEROL) 1.25 MG (74689 UT) capsule capsule, Take 1 capsule by mouth Every 7 (Seven) Days. (Patient taking differently: Take 1 capsule by mouth Every 7 (Seven) Days. Monday), Disp: 12 capsule, Rfl: 3    pravastatin (PRAVACHOL) 10 MG tablet, Take 1 tablet by mouth Daily. (Patient not taking: Reported on 6/26/2025), Disp: 90 tablet, Rfl: 1     Objective     Vital Signs:   /79 (BP Location: Right arm, Patient Position: Sitting, Cuff Size: Large Adult)   Pulse 103   Temp 98.6 °F (37 °C) (Oral)   Resp 16   Ht 165.1 cm (65\")   Wt 78.6 kg (173 lb 3.2 oz)   SpO2 96% Comment: Room air  BMI 28.82 kg/m²     Physical Exam  Vitals:    06/26/25 1414   BP: 119/79   Pulse: 103   Resp: 16 "   Temp: 98.6 °F (37 °C)   SpO2: 96%       General: Alert, NAD  HEENT:  EOMI, no sinus tenderness  Neck:  Supple, no thyromegaly,  no JVD  Lymph: no cervical, supraclavicular lymphadenopathy bilaterally  Chest:  clear to auscultation bilaterally, no wheezing or crackles; no work of breathing noted  CV: RRR, no M/G/R, pulses 2+, equal.  Abd:  Soft, NT, ND, +BS  EXT:  no clubbing, no cyanosis, no edema b/l  Neuro:  A&Ox3, CN grossly intact, no focal deficits  Skin: No rashes or lesions noted       Result Review :   I have personally reviewed patient's labs and images.          Assessment and Plan      Patient Active Problem List   Diagnosis    Atypical ductal hyperplasia of breast    Thymoma    Chronic cough    Constipation    Disorder of diaphragm    Early satiety    Edema of lower extremity    Esophageal dysphagia    Fatigue    HTN (hypertension)    Hypothyroidism    Right knee pain    Rupture of patellar tendon    Vitamin B12 deficiency    Vitamin D deficiency    Acute otitis media    Cough    COVID-19 virus infection    S/P lumpectomy, right breast    Shortness of breath    Rectal bleeding    Malignant neoplasm of thymus    Low vitamin D level    Low serum vitamin B12    Lateral epicondylitis    History of tonsillectomy    Edema of lower extremity    Bilateral chronic serous otitis media    History of colon polyps    Bodies, loose, joint, knee, left    Primary osteoarthritis of left knee    Impaired fasting glucose    Follicular lymphoma of intra-abdominal lymph nodes    Hyperlipidemia       Impression and Plan:    Recent hospitalization for multifocal pneumonia  Chronic cough  Elevated left sided hemidiaphragm  History of Thymoma  History of lymphoma    Has residual cough but improving with time; continue to monitor  Has mild chronic dyspnea which she attributes to elevated paralyzed L hemidiaphragm  Has repeat chest CT for f/u for her thymoma at [facility] 7/25; Pt will bring disc and report to our next visit.  Will  follow to ensure resolution of her multifocal pneumonia  PFTs done at [facility] showed moderate restrictive lung disease likely secondary to elevated left-sided hemidiaphragm    Vaccination status: Up-to-date    Medications personally reviewed.    Follow Up   No follow-ups on file.  Patient was given instructions and counseling regarding her condition or for health maintenance advice. Please see specific information pulled into the AVS if appropriate.

## 2025-07-09 ENCOUNTER — HOSPITAL ENCOUNTER (EMERGENCY)
Facility: HOSPITAL | Age: 65
Discharge: HOME | End: 2025-07-09
Attending: EMERGENCY MEDICINE | Admitting: EMERGENCY MEDICINE
Payer: MEDICARE

## 2025-07-09 VITALS
OXYGEN SATURATION: 97 % | RESPIRATION RATE: 18 BRPM | HEIGHT: 65 IN | DIASTOLIC BLOOD PRESSURE: 78 MMHG | SYSTOLIC BLOOD PRESSURE: 122 MMHG | WEIGHT: 168 LBS | TEMPERATURE: 98.3 F | BODY MASS INDEX: 27.99 KG/M2 | HEART RATE: 96 BPM

## 2025-07-09 DIAGNOSIS — L60.0 INGROWN TOENAIL: Primary | ICD-10-CM

## 2025-07-09 PROCEDURE — 99282 EMERGENCY DEPT VISIT SF MDM: CPT

## 2025-07-09 RX ORDER — CEPHALEXIN 500 MG/1
500 CAPSULE ORAL 4 TIMES DAILY
Qty: 28 CAPSULE | Refills: 0 | Status: SHIPPED | OUTPATIENT
Start: 2025-07-09 | End: 2025-07-16

## 2025-07-09 NOTE — ED NOTES
Pt up for discharge before this primary rn could do a full assessment. Pt was seen by the provider only.

## 2025-07-09 NOTE — ED PROVIDER NOTES
Time: 4:17 PM EDT  Date of encounter:  7/9/2025  Independent Historian/Clinical History and Information was obtained by:   Patient    History is limited by: N/A    Chief Complaint: left great toe erythema       History of Present Illness:  Patient is a 64 y.o. year old female who presents to the emergency department for evaluation of left great toe erythema that began yesterday.  She states she had to pick out her ingrown toenail.  Pt denies injury/trauma      Patient Care Team  Primary Care Provider: Portia Serna MD    Past Medical History:     Allergies   Allergen Reactions    Clindamycin Hives, Itching and Nausea And Vomiting    Erythromycin Hives, Itching, GI Intolerance and Unknown - High Severity    Oxycodone-Acetaminophen Hallucinations     Past Medical History:   Diagnosis Date    Atypical ductal hyperplasia of right breast     Breast lump     Cancer     Chronic cough     Colon polyp     Constipation     Early satiety     Esophageal dysphagia     Fatigue     Hemidiaphragm paralysis     HTN (hypertension)     Hypothyroidism     Lateral epicondylitis of left elbow 03/14/2018    Lymphoma     OM (otitis media)     PONV (postoperative nausea and vomiting)     Rectal bleeding     Right knee pain 08/30/2018    Shortness of breath     Tear of PCL (posterior cruciate ligament) of knee, right, initial encounter 08/30/2018    Thymoma, malignant     Thyroid disorder     Vitamin B12 deficiency     Vitamin D deficiency      Past Surgical History:   Procedure Laterality Date    BREAST LUMPECTOMY Right     COLONOSCOPY      Dr. Roque    COLONOSCOPY N/A 05/10/2024    Procedure: COLONOSCOPY WITH POLYPECTOMY;  Surgeon: Ayden Roque MD;  Location: [facility] ENDOSCOPY;  Service: Gastroenterology;  Laterality: N/A;  COLON POLYP    ENDOSCOPY  09/11/2018    Dr. Roque    HYSTERECTOMY  1994    STOMACH SURGERY      biopsy    TONSILLECTOMY  1967     Family History   Problem Relation Age of Onset    Breast cancer  Mother     Heart disease Mother     Cancer Mother     Arthritis Mother     Heart disease Father     Arthritis Father     Diabetes Sister     Colon cancer Neg Hx        Home Medications:  Prior to Admission medications    Medication Sig Start Date End Date Taking? Authorizing Provider   ammonium lactate (AmLactin) 12 % lotion Apply  topically to the appropriate area as directed As Needed for Dry Skin. 11/15/24   Portia Serna MD   clotrimazole-betamethasone (Lotrisone) 1-0.05 % cream Apply 1 Application topically to the appropriate area as directed 2 (Two) Times a Day. 11/15/24   Portia Serna MD   levothyroxine (Synthroid) 50 MCG tablet Take 1 tablet by mouth Daily. 11/15/24   Portia Serna MD   nystatin (MYCOSTATIN) 632352 UNIT/GM powder Apply  topically to the appropriate area as directed 3 (Three) Times a Day. 11/15/24   Portia Serna MD   pravastatin (PRAVACHOL) 10 MG tablet Take 1 tablet by mouth Daily.  Patient not taking: Reported on 6/26/2025 11/15/24   Portia Serna MD   triamterene-hydrochlorothiazide (MAXZIDE-25) 37.5-25 MG per tablet Take 0.5 tablets by mouth Daily. 5/15/25   Portia Serna MD   vitamin D (ERGOCALCIFEROL) 1.25 MG (50724 UT) capsule capsule Take 1 capsule by mouth Every 7 (Seven) Days.  Patient taking differently: Take 1 capsule by mouth Every 7 (Seven) Days. Monday 11/15/24   Portia Serna MD        Social History:   Social History     Tobacco Use    Smoking status: Never     Passive exposure: Never    Smokeless tobacco: Never   Vaping Use    Vaping status: Never Used   Substance Use Topics    Alcohol use: Never    Drug use: Never         Review of Systems:  Review of Systems   Constitutional:  Negative for chills and fever.   HENT:  Negative for congestion, rhinorrhea and sore throat.    Eyes:  Negative for pain and visual disturbance.   Respiratory:  Negative for apnea, cough, chest tightness and  shortness of breath.    Cardiovascular:  Negative for chest pain and palpitations.   Gastrointestinal:  Negative for abdominal pain, diarrhea, nausea and vomiting.   Genitourinary:  Negative for difficulty urinating and dysuria.   Musculoskeletal:  Negative for joint swelling and myalgias.   Skin:  Positive for color change.   Neurological:  Negative for seizures and headaches.   Psychiatric/Behavioral: Negative.     All other systems reviewed and are negative.       Physical Exam:  There were no vitals taken for this visit.    Physical Exam  Vitals and nursing note reviewed.   Constitutional:       General: She is not in acute distress.     Appearance: Normal appearance. She is not toxic-appearing.   HENT:      Head: Normocephalic and atraumatic.      Jaw: There is normal jaw occlusion.   Eyes:      General: Lids are normal.      Extraocular Movements: Extraocular movements intact.      Conjunctiva/sclera: Conjunctivae normal.      Pupils: Pupils are equal, round, and reactive to light.   Cardiovascular:      Rate and Rhythm: Normal rate and regular rhythm.      Pulses: Normal pulses.      Heart sounds: Normal heart sounds.   Pulmonary:      Effort: Pulmonary effort is normal. No respiratory distress.      Breath sounds: Normal breath sounds. No wheezing or rhonchi.   Abdominal:      General: Abdomen is flat.      Palpations: Abdomen is soft.      Tenderness: There is no abdominal tenderness. There is no guarding or rebound.   Musculoskeletal:         General: Normal range of motion.      Cervical back: Normal range of motion and neck supple.      Right lower leg: No edema.      Left lower leg: No edema.   Skin:     General: Skin is warm and dry.      Findings: Erythema (Present to left great toe) present.   Neurological:      Mental Status: She is alert and oriented to person, place, and time. Mental status is at baseline.   Psychiatric:         Mood and Affect: Mood normal.                    Medical Decision  Making:      Comorbidities that affect care:    Cancer, Hypertension    External Notes reviewed:          The following orders were placed and all results were independently analyzed by me:  No orders of the defined types were placed in this encounter.      Medications Given in the Emergency Department:  Medications - No data to display     ED Course:         Labs:    Lab Results (last 24 hours)       ** No results found for the last 24 hours. **             Imaging:    No Radiology Exams Resulted Within Past 24 Hours      Differential Diagnosis and Discussion:    Rash: Differential diagnosis includes but is not limited to sepsis, cellulitis, [place] Spotted Fever, meningitis, meningococcemia, Varicella, Strep infection, dermatitis, allergic reaction, Lyme disease, and toxic shock syndrome.    PROCEDURES:        No orders to display       Procedures    MDM       Will treat patient with Keflex.  Will provide ambulatory referral to podiatry for follow-up.              Patient Care Considerations:          Consultants/Shared Management Plan:    None    Social Determinants of Health:    Patient is independent, reliable, and has access to care.       Disposition and Care Coordination:    Discharged: The patient is suitable and stable for discharge with no need for consideration of admission.    I have explained the patient´s condition, diagnoses and treatment plan based on the information available to me at this time. I have answered questions and addressed any concerns. The patient has a good  understanding of the patient´s diagnosis, condition, and treatment plan as can be expected at this point. The vital signs have been stable. The patient´s condition is stable and appropriate for discharge from the emergency department.      The patient will pursue further outpatient evaluation with the primary care physician or other designated or consulting physician as outlined in the discharge instructions. They are  agreeable to this plan of care and follow-up instructions have been explained in detail. The patient has received these instructions in written format and has expressed an understanding of the discharge instructions. The patient is aware that any significant change in condition or worsening of symptoms should prompt an immediate return to this or the closest emergency department or call to 1.  I have explained discharge medications and the need for follow up with the patient/caretakers. This was also printed in the discharge instructions. Patient was discharged with the following medications and follow up:      Medication List        New Prescriptions      cephalexin 500 MG capsule  Commonly known as: KEFLEX  Take 1 capsule by mouth 4 (Four) Times a Day for 7 days.            Changed      vitamin D 1.25 MG (82295 UT) capsule capsule  Commonly known as: ERGOCALCIFEROL  Take 1 capsule by mouth Every 7 (Seven) Days.  What changed: additional instructions               Where to Get Your Medications        These medications were sent to [id number] - [place], [place] - [address] AT [facility] - [phone] [facility] [phone]   [address] [place] [address]      Phone: [phone]   cephalexin 500 MG capsule      Portia Serna MD  [address] [place] [id number] [phone]    Call in 1 day  to schedule follow up       Final diagnoses:   Ingrown toenail        ED Disposition       ED Disposition   Discharge    Condition   Stable    Comment   --               This medical record created using voice recognition software.             Roger Gomez PA-C  07/09/25 [id number]

## 2025-07-11 ENCOUNTER — OFFICE VISIT (OUTPATIENT)
Dept: PODIATRY | Facility: CLINIC | Age: 65
End: 2025-07-11
Payer: MEDICARE

## 2025-07-11 VITALS
OXYGEN SATURATION: 95 % | WEIGHT: 174 LBS | DIASTOLIC BLOOD PRESSURE: 79 MMHG | TEMPERATURE: 96.9 F | SYSTOLIC BLOOD PRESSURE: 112 MMHG | HEIGHT: 65 IN | HEART RATE: 90 BPM | BODY MASS INDEX: 28.99 KG/M2

## 2025-07-11 DIAGNOSIS — L60.0 ONYCHOCRYPTOSIS: ICD-10-CM

## 2025-07-11 DIAGNOSIS — M79.672 FOOT PAIN, LEFT: Primary | ICD-10-CM

## 2025-07-11 NOTE — PROGRESS NOTES
[facility] - PODIATRY    Today's Date: 07/11/25    Patient Name: Oksana Smith  MRN: [id number]  CSN: [id number]  PCP: Portia Serna MD  Referring Provider: Nichelle Montesinos PA*    Patient or patient representative verbalized consent for the use of Ambient Listening during the visit with  Merritt Christensen DPM for chart documentation. 7/11/2025  14:06 EDT    SUBJECTIVE     Chief Complaint   Patient presents with    Left Foot - Ingrown Toenail, Establish Care     Seen in ER 7/9/25 given antibiotics which she states she started yesterday and referred here  states was able to dig it out herself and is better     HPI: Oksana Simth, a 64 y.o.female, presents to clinic.    New    History of Present Illness  The patient is a female who presents as a new patient with an ingrown toenail on her right great toe.    She reports that the ingrown toenail developed approximately 2 to 3 days ago. She sought immediate medical attention at the ER, where she was able to remove the ingrown portion of the nail using nail clippers. This is her first experience with an ingrown toenail. She has no history of diabetes or heart disease.    Patient denies any fevers, chills, nausea, vomiting, shortness of breath, nor any other constitutional signs nor symptoms.    No other pedal complaints at this time.    Past Medical History:   Diagnosis Date    Atypical ductal hyperplasia of right breast     Breast lump     Cancer     Chronic cough     Colon polyp     Constipation     Early satiety     Esophageal dysphagia     Fatigue     Hemidiaphragm paralysis     HTN (hypertension)     Hypothyroidism     Lateral epicondylitis of left elbow 03/14/2018    Lymphoma     OM (otitis media)     PONV (postoperative nausea and vomiting)     Rectal bleeding     Right knee pain 08/30/2018    Shortness of breath     Tear of PCL (posterior cruciate ligament) of knee, right, initial encounter 08/30/2018    Thymoma,  malignant     Thyroid disorder     Vitamin B12 deficiency     Vitamin D deficiency      Past Surgical History:   Procedure Laterality Date    BREAST LUMPECTOMY Right     COLONOSCOPY      Dr. Roque    COLONOSCOPY N/A 05/10/2024    Procedure: COLONOSCOPY WITH POLYPECTOMY;  Surgeon: Ayden Roque MD;  Location: [facility] ENDOSCOPY;  Service: Gastroenterology;  Laterality: N/A;  COLON POLYP    ENDOSCOPY  09/11/2018    Dr. oRque    HYSTERECTOMY  1994    STOMACH SURGERY      biopsy    TONSILLECTOMY  1967     Family History   Problem Relation Age of Onset    Breast cancer Mother     Heart disease Mother     Cancer Mother     Arthritis Mother     Heart disease Father     Arthritis Father     Diabetes Sister     Colon cancer Neg Hx      Social History     Socioeconomic History    Marital status:    Tobacco Use    Smoking status: Never     Passive exposure: Never    Smokeless tobacco: Never   Vaping Use    Vaping status: Never Used   Substance and Sexual Activity    Alcohol use: Never    Drug use: Never    Sexual activity: Defer     Allergies   Allergen Reactions    Clindamycin Hives, Itching and Nausea And Vomiting    Erythromycin Hives, Itching, GI Intolerance and Unknown - High Severity    Oxycodone-Acetaminophen Hallucinations     Current Outpatient Medications   Medication Sig Dispense Refill    ammonium lactate (AmLactin) 12 % lotion Apply  topically to the appropriate area as directed As Needed for Dry Skin. 225 g 1    cephalexin (KEFLEX) 500 MG capsule Take 1 capsule by mouth 4 (Four) Times a Day for 7 days. 28 capsule 0    clotrimazole-betamethasone (Lotrisone) 1-0.05 % cream Apply 1 Application topically to the appropriate area as directed 2 (Two) Times a Day. 45 g 1    levothyroxine (Synthroid) 50 MCG tablet Take 1 tablet by mouth Daily. 90 tablet 1    nystatin (MYCOSTATIN) 438120 UNIT/GM powder Apply  topically to the appropriate area as directed 3 (Three) Times a Day. 60 g 2     triamterene-hydrochlorothiazide (MAXZIDE-25) 37.5-25 MG per tablet Take 0.5 tablets by mouth Daily. 45 tablet 1    vitamin D (ERGOCALCIFEROL) 1.25 MG (87091 UT) capsule capsule Take 1 capsule by mouth Every 7 (Seven) Days. (Patient taking differently: Take 1 capsule by mouth Every 7 (Seven) Days. Monday) 12 capsule 3    pravastatin (PRAVACHOL) 10 MG tablet Take 1 tablet by mouth Daily. (Patient not taking: Reported on 6/26/2025) 90 tablet 1     No current facility-administered medications for this visit.       OBJECTIVE     Vitals:    07/11/25 1326   BP: 112/79   Pulse: 90   Temp: 96.9 °F (36.1 °C)   SpO2: 95%       WBC   Date Value Ref Range Status   06/05/2025 6.86 3.40 - 10.80 10*3/mm3 Final     RBC   Date Value Ref Range Status   06/05/2025 3.88 3.77 - 5.28 10*6/mm3 Final     Hemoglobin   Date Value Ref Range Status   06/05/2025 11.8 (L) 12.0 - 15.9 g/dL Final     Hematocrit   Date Value Ref Range Status   06/05/2025 36.1 34.0 - 46.6 % Final     MCV   Date Value Ref Range Status   06/05/2025 93.0 79.0 - 97.0 fL Final     MCH   Date Value Ref Range Status   06/05/2025 30.4 26.6 - 33.0 pg Final     MCHC   Date Value Ref Range Status   06/05/2025 32.7 31.5 - 35.7 g/dL Final     RDW   Date Value Ref Range Status   06/05/2025 13.4 12.3 - 15.4 % Final     RDW-SD   Date Value Ref Range Status   06/05/2025 46.3 37.0 - 54.0 fl Final     MPV   Date Value Ref Range Status   06/05/2025 9.9 6.0 - 12.0 fL Final     Platelets   Date Value Ref Range Status   06/05/2025 202 140 - 450 10*3/mm3 Final     Neutrophil %   Date Value Ref Range Status   06/02/2025 83.9 (H) 42.7 - 76.0 % Final     Lymphocyte %   Date Value Ref Range Status   06/02/2025 9.9 (L) 19.6 - 45.3 % Final     Monocyte %   Date Value Ref Range Status   06/02/2025 3.6 (L) 5.0 - 12.0 % Final     Eosinophil %   Date Value Ref Range Status   06/02/2025 0.5 0.3 - 6.2 % Final     Basophil %   Date Value Ref Range Status   06/02/2025 0.9 0.0 - 1.5 % Final     Immature  Grans %   Date Value Ref Range Status   06/02/2025 1.2 (H) 0.0 - 0.5 % Final     Neutrophils Absolute   Date Value Ref Range Status   06/10/2025 6.8 1.5 - 7.1 x10(3)/ul Final     Lymphocytes, Absolute   Date Value Ref Range Status   06/02/2025 1.27 0.70 - 3.10 10*3/mm3 Final     Monocytes, Absolute   Date Value Ref Range Status   06/02/2025 0.46 0.10 - 0.90 10*3/mm3 Final     Eosinophils Absolute   Date Value Ref Range Status   06/10/2025 0.3 0.0 - 0.7 x10(3)/ul Final     Basophils Absolute   Date Value Ref Range Status   06/10/2025 0.1 0.0 - 0.3 x10(3)/ul Final     Immature Grans, Absolute   Date Value Ref Range Status   06/02/2025 0.16 (H) 0.00 - 0.05 10*3/mm3 Final     nRBC   Date Value Ref Range Status   06/02/2025 0.0 0.0 - 0.2 /100 WBC Final         Lab Results   Component Value Date    GLUCOSE 99 06/05/2025    BUN 8.9 06/05/2025    CREATININE 0.82 06/05/2025     06/05/2025    K 3.7 06/05/2025     (H) 06/05/2025    CALCIUM 8.5 (L) 06/05/2025    PROTEINTOT 6.6 06/01/2025    ALBUMIN 2.7 (L) 06/03/2025    ALT 7 06/01/2025    AST 11 06/01/2025    ALKPHOS 60 06/01/2025    BILITOT 0.6 06/01/2025    GLOB 3.1 06/01/2025    AGRATIO 1.1 06/01/2025    BCR 10.9 06/05/2025    ANIONGAP 9.2 06/05/2025    EGFR 81.0 06/05/2025       Patient seen in no apparent distress.      PHYSICAL EXAM:     Foot/Ankle Exam    GENERAL  Appearance:  appears stated age  Orientation:  AAOx3  Affect:  appropriate  Gait:  unimpaired  Assistance:  independent  Right shoe gear: casual shoe  Left shoe gear: casual shoe    VASCULAR     Right Foot Vascularity   Normal vascular exam    Dorsalis pedis:  2+  Posterior tibial:  2+  Skin temperature:  warm  Edema grading:  None  CFT:  < 3 seconds  Pedal hair growth:  Present  Varicosities:  none     Left Foot Vascularity   Normal vascular exam    Dorsalis pedis:  2+  Posterior tibial:  2+  Skin temperature:  warm  Edema grading:  None  CFT:  < 3 seconds  Pedal hair growth:   Present  Varicosities:  none     NEUROLOGIC     Right Foot Neurologic   Normal sensation    Light touch sensation: normal  Vibratory sensation: normal  Hot/Cold sensation: normal  Protective Sensation using Lutz-Bonnie Monofilament:   Sites intact: 10  Sites tested: 10     Left Foot Neurologic   Normal sensation    Light touch sensation: normal  Vibratory sensation: normal  Hot/Cold sensation:  normal  Protective Sensation using Lutz-Bonnie Monofilament:   Sites intact: 10  Sites tested: 10    MUSCULOSKELETAL     Left Foot Musculoskeletal   Tenderness:  toe 1 tenderness    MUSCLE STRENGTH     Right Foot Muscle Strength   Foot dorsiflexion:  4  Foot plantar flexion:  4  Foot inversion:  4  Foot eversion:  4     Left Foot Muscle Strength   Foot dorsiflexion:  4  Foot plantar flexion:  4  Foot inversion:  4  Foot eversion:  4    RANGE OF MOTION     Right Foot Range of Motion   Foot and ankle ROM within normal limits       Left Foot Range of Motion   Foot and ankle ROM within normal limits      DERMATOLOGIC      Right Foot Dermatologic   Skin  Right foot skin is intact.      Left Foot Dermatologic   Skin  Left foot skin is intact.   Nails  1.  Positive for ingrown toenail (Medial border). (No signs of edema, erythema, lymphangitis, nor signs of infection.)      Physical Exam  Right great toe was examined. No pus noted.        ASSESSMENT/PLAN     Diagnoses and all orders for this visit:    1. Foot pain, left (Primary)    2. Onychocryptosis        Assessment & Plan  1. Ingrown toenail on the right great toe.  The ingrown toenail does not exhibit any signs of infection such as pus. The nail root extends far back, and the sides are curved inwards. She was advised to complete her current course of antibiotics. A detailed explanation of the procedure for ingrown toenail removal was provided, including the use of local anesthesia, cutting the nail, and applying medication to the nail root to prevent recurrence.  Post-procedure care instructions were also discussed, which include rest, pain management with Tylenol or ibuprofen, Epsom salt soaks, Neosporin application, and bandaging. She was informed that there is a 95% chance of the nail not growing back if the procedure is performed. However, she opted to monitor the condition for now. She was advised to trim the nail carefully and report any flare-ups.    Patient is to monitor for recurrence and any new symptoms and to contact Dr. Christensen's office for a follow-up appointment.      The patient states understanding and agreement with this plan.    Comprehensive lower extremity examination and evaluation was performed.    Discussed findings and treatment plan including risks, benefits, and treatment options with patient in detail. Patient agreed with treatment plan.    Medications and allergies reviewed.  Reviewed available lab values along with other pertinent labs.  These were discussed with the patient.    An After Visit Summary was printed and given to the patient at discharge, including (if requested) any available informative/educational handouts regarding diagnosis, treatment, or medications. All questions were answered to patient/family satisfaction. Should symptoms fail to improve or worsen they agree to call or return to clinic or to go to the Emergency Department. Discussed the importance of following up with any needed screening tests/labs/specialist appointments and any requested follow-up recommended by me today. Importance of maintaining follow-up discussed and patient accepts that missed appointments can delay diagnosis and potentially lead to worsening of conditions.    Return if symptoms worsen or fail to improve., or sooner if acute issues arise.    This document has been electronically signed by Merritt Christensen DPM on July 11, 2025 14:10 EDT

## 2025-07-28 ENCOUNTER — TELEPHONE (OUTPATIENT)
Dept: PULMONOLOGY | Facility: CLINIC | Age: 65
End: 2025-07-28

## 2025-08-19 ENCOUNTER — OFFICE VISIT (OUTPATIENT)
Dept: PULMONOLOGY | Facility: CLINIC | Age: 65
End: 2025-08-19
Payer: MEDICARE

## 2025-08-19 VITALS
RESPIRATION RATE: 16 BRPM | SYSTOLIC BLOOD PRESSURE: 131 MMHG | OXYGEN SATURATION: 96 % | TEMPERATURE: 98.1 F | BODY MASS INDEX: 29.02 KG/M2 | DIASTOLIC BLOOD PRESSURE: 82 MMHG | HEIGHT: 65 IN | HEART RATE: 83 BPM | WEIGHT: 174.2 LBS

## 2025-08-19 DIAGNOSIS — Z23 ENCOUNTER FOR IMMUNIZATION: ICD-10-CM

## 2025-08-19 DIAGNOSIS — J18.9 MULTIFOCAL PNEUMONIA: Primary | ICD-10-CM

## 2025-08-19 DIAGNOSIS — R05.3 CHRONIC COUGH: ICD-10-CM

## 2025-08-19 DIAGNOSIS — J98.6 CHRONICALLY ELEVATED HEMIDIAPHRAGM: ICD-10-CM

## 2025-08-19 DIAGNOSIS — Z87.898 HISTORY OF THYMOMA: ICD-10-CM

## 2025-08-19 PROCEDURE — G0009 ADMIN PNEUMOCOCCAL VACCINE: HCPCS

## 2025-08-19 PROCEDURE — 1160F RVW MEDS BY RX/DR IN RCRD: CPT

## 2025-08-19 PROCEDURE — 3079F DIAST BP 80-89 MM HG: CPT

## 2025-08-19 PROCEDURE — 3075F SYST BP GE 130 - 139MM HG: CPT

## 2025-08-19 PROCEDURE — 99214 OFFICE O/P EST MOD 30 MIN: CPT

## 2025-08-19 PROCEDURE — 90684 PCV21 VACCINE IM: CPT

## 2025-08-19 PROCEDURE — 1159F MED LIST DOCD IN RCRD: CPT

## 2025-08-19 RX ORDER — FLUCONAZOLE 150 MG/1
TABLET ORAL
COMMUNITY
Start: 2025-08-01

## (undated) DEVICE — SOL IRRG H2O PL/BG 1000ML STRL

## (undated) DEVICE — SNAR E/S POLYP SNAREMASTER OVL/10MM 2.8X2300MM YEL

## (undated) DEVICE — THE SINGLE USE ETRAP – POLYP TRAP IS USED FOR SUCTION RETRIEVAL OF ENDOSCOPICALLY REMOVED POLYPS.: Brand: ETRAP

## (undated) DEVICE — Device

## (undated) DEVICE — Device: Brand: DEFENDO AIR/WATER/SUCTION AND BIOPSY VALVE

## (undated) DEVICE — SOLIDIFIER LIQLOC PLS 1500CC BT